# Patient Record
Sex: MALE | Race: WHITE | NOT HISPANIC OR LATINO | Employment: OTHER | URBAN - METROPOLITAN AREA
[De-identification: names, ages, dates, MRNs, and addresses within clinical notes are randomized per-mention and may not be internally consistent; named-entity substitution may affect disease eponyms.]

---

## 2023-04-03 LAB
THYROPEROXIDASE AB (IU/ML) IN SER/PLAS: >1000 IU/ML
THYROTROPIN (MIU/L) IN SER/PLAS BY DETECTION LIMIT <= 0.05 MIU/L: 0.33 MIU/L (ref 0.44–3.98)
THYROXINE (T4) FREE (NG/DL) IN SER/PLAS: 2.05 NG/DL (ref 0.78–1.48)

## 2023-04-06 LAB — THYROGLOBULIN AB (IU/ML) IN SER/PLAS: >2200 IU/ML (ref 0–4)

## 2023-06-20 LAB
ANION GAP IN SER/PLAS: 14 MMOL/L (ref 10–20)
CALCIUM (MG/DL) IN SER/PLAS: 10.2 MG/DL (ref 8.6–10.6)
CARBON DIOXIDE, TOTAL (MMOL/L) IN SER/PLAS: 29 MMOL/L (ref 21–32)
CHLORIDE (MMOL/L) IN SER/PLAS: 100 MMOL/L (ref 98–107)
CHOLESTEROL (MG/DL) IN SER/PLAS: 103 MG/DL (ref 0–199)
CHOLESTEROL IN HDL (MG/DL) IN SER/PLAS: 29 MG/DL
CHOLESTEROL/HDL RATIO: 3.6
CREATININE (MG/DL) IN SER/PLAS: 0.77 MG/DL (ref 0.5–1.3)
ERYTHROCYTE DISTRIBUTION WIDTH (RATIO) BY AUTOMATED COUNT: 13.4 % (ref 11.5–14.5)
ERYTHROCYTE MEAN CORPUSCULAR HEMOGLOBIN CONCENTRATION (G/DL) BY AUTOMATED: 33.3 G/DL (ref 32–36)
ERYTHROCYTE MEAN CORPUSCULAR VOLUME (FL) BY AUTOMATED COUNT: 84 FL (ref 80–100)
ERYTHROCYTES (10*6/UL) IN BLOOD BY AUTOMATED COUNT: 5.36 X10E12/L (ref 4.5–5.9)
GFR MALE: >90 ML/MIN/1.73M2
GLUCOSE (MG/DL) IN SER/PLAS: 97 MG/DL (ref 74–99)
HEMATOCRIT (%) IN BLOOD BY AUTOMATED COUNT: 45 % (ref 41–52)
HEMOGLOBIN (G/DL) IN BLOOD: 15 G/DL (ref 13.5–17.5)
LDL: 54 MG/DL (ref 0–99)
LEUKOCYTES (10*3/UL) IN BLOOD BY AUTOMATED COUNT: 3.7 X10E9/L (ref 4.4–11.3)
NRBC (PER 100 WBCS) BY AUTOMATED COUNT: 0 /100 WBC (ref 0–0)
PLATELETS (10*3/UL) IN BLOOD AUTOMATED COUNT: 101 X10E9/L (ref 150–450)
POTASSIUM (MMOL/L) IN SER/PLAS: 3.7 MMOL/L (ref 3.5–5.3)
SODIUM (MMOL/L) IN SER/PLAS: 139 MMOL/L (ref 136–145)
THYROTROPIN (MIU/L) IN SER/PLAS BY DETECTION LIMIT <= 0.05 MIU/L: 0.92 MIU/L (ref 0.44–3.98)
TRIGLYCERIDE (MG/DL) IN SER/PLAS: 100 MG/DL (ref 0–149)
UREA NITROGEN (MG/DL) IN SER/PLAS: 14 MG/DL (ref 6–23)
VLDL: 20 MG/DL (ref 0–40)

## 2023-07-17 DIAGNOSIS — I10 BENIGN ESSENTIAL HTN: Primary | ICD-10-CM

## 2023-07-17 RX ORDER — AMLODIPINE BESYLATE 10 MG/1
TABLET ORAL
Qty: 90 TABLET | Refills: 1 | Status: SHIPPED | OUTPATIENT
Start: 2023-07-17 | End: 2023-12-11 | Stop reason: ALTCHOICE

## 2023-10-04 ENCOUNTER — OFFICE VISIT (OUTPATIENT)
Dept: PRIMARY CARE | Facility: CLINIC | Age: 73
End: 2023-10-04
Payer: MEDICARE

## 2023-10-04 VITALS
BODY MASS INDEX: 30.43 KG/M2 | OXYGEN SATURATION: 97 % | SYSTOLIC BLOOD PRESSURE: 142 MMHG | HEART RATE: 71 BPM | TEMPERATURE: 98 F | DIASTOLIC BLOOD PRESSURE: 66 MMHG | WEIGHT: 212.6 LBS | HEIGHT: 70 IN | RESPIRATION RATE: 17 BRPM

## 2023-10-04 DIAGNOSIS — L56.8: Primary | ICD-10-CM

## 2023-10-04 PROBLEM — H65.90 SEROUS OTITIS MEDIA: Status: ACTIVE | Noted: 2023-10-04

## 2023-10-04 PROBLEM — Z95.828 S/P ASCENDING AORTIC REPLACEMENT: Status: ACTIVE | Noted: 2023-10-04

## 2023-10-04 PROBLEM — Z95.1 S/P CABG X 4: Status: ACTIVE | Noted: 2023-10-04

## 2023-10-04 PROBLEM — L82.1 OTHER SEBORRHEIC KERATOSIS: Status: ACTIVE | Noted: 2021-09-30

## 2023-10-04 PROBLEM — H52.03 HYPEROPIA WITH PRESBYOPIA OF BOTH EYES: Status: ACTIVE | Noted: 2023-10-04

## 2023-10-04 PROBLEM — L72.0 EPIDERMAL CYST: Status: ACTIVE | Noted: 2021-09-30

## 2023-10-04 PROBLEM — M77.8 LEFT WRIST TENDONITIS: Status: ACTIVE | Noted: 2023-10-04

## 2023-10-04 PROBLEM — M11.231 PSEUDOGOUT OF RIGHT WRIST: Status: ACTIVE | Noted: 2023-10-04

## 2023-10-04 PROBLEM — R89.9 ABNORMAL LABORATORY TEST: Status: ACTIVE | Noted: 2023-10-04

## 2023-10-04 PROBLEM — I71.20 AORTIC ANEURYSM, THORACIC (CMS-HCC): Status: ACTIVE | Noted: 2023-10-04

## 2023-10-04 PROBLEM — H52.4 HYPEROPIA WITH PRESBYOPIA OF BOTH EYES: Status: ACTIVE | Noted: 2023-10-04

## 2023-10-04 PROBLEM — M25.522 PAIN OF BOTH ELBOWS: Status: ACTIVE | Noted: 2023-10-04

## 2023-10-04 PROBLEM — M25.532 LEFT WRIST PAIN: Status: ACTIVE | Noted: 2023-10-04

## 2023-10-04 PROBLEM — M25.521 PAIN OF BOTH ELBOWS: Status: ACTIVE | Noted: 2023-10-04

## 2023-10-04 PROBLEM — H93.8X3 FULLNESS IN EAR, BILATERAL: Status: ACTIVE | Noted: 2023-10-04

## 2023-10-04 PROBLEM — U07.1 COVID-19 VIRUS INFECTION: Status: ACTIVE | Noted: 2023-10-04

## 2023-10-04 PROBLEM — H90.3 BILATERAL SENSORINEURAL HEARING LOSS: Status: ACTIVE | Noted: 2023-10-04

## 2023-10-04 PROBLEM — E03.9 HYPOTHYROIDISM: Status: ACTIVE | Noted: 2023-10-04

## 2023-10-04 PROBLEM — H52.13 MYOPIA, BILATERAL: Status: ACTIVE | Noted: 2023-10-04

## 2023-10-04 PROBLEM — L98.9 LESION OF SKIN OF SCALP: Status: ACTIVE | Noted: 2023-10-04

## 2023-10-04 PROBLEM — H52.203 ASTIGMATISM OF BOTH EYES: Status: ACTIVE | Noted: 2023-10-04

## 2023-10-04 PROBLEM — L91.8 OTHER HYPERTROPHIC DISORDERS OF THE SKIN: Status: ACTIVE | Noted: 2021-09-30

## 2023-10-04 PROBLEM — D69.6 THROMBOCYTOPENIA (CMS-HCC): Status: ACTIVE | Noted: 2023-10-04

## 2023-10-04 PROBLEM — I25.10 CAD (CORONARY ARTERY DISEASE): Status: ACTIVE | Noted: 2023-10-04

## 2023-10-04 PROBLEM — M18.9 OSTEOARTHRITIS OF CARPOMETACARPAL (CMC) JOINT OF THUMB: Status: ACTIVE | Noted: 2023-10-04

## 2023-10-04 PROBLEM — Z98.890 S/P COLONOSCOPY WITH POLYPECTOMY: Status: ACTIVE | Noted: 2023-10-04

## 2023-10-04 PROCEDURE — 1036F TOBACCO NON-USER: CPT | Performed by: INTERNAL MEDICINE

## 2023-10-04 PROCEDURE — 99213 OFFICE O/P EST LOW 20 MIN: CPT | Performed by: INTERNAL MEDICINE

## 2023-10-04 PROCEDURE — 3077F SYST BP >= 140 MM HG: CPT | Performed by: INTERNAL MEDICINE

## 2023-10-04 PROCEDURE — 1159F MED LIST DOCD IN RCRD: CPT | Performed by: INTERNAL MEDICINE

## 2023-10-04 PROCEDURE — 3078F DIAST BP <80 MM HG: CPT | Performed by: INTERNAL MEDICINE

## 2023-10-04 PROCEDURE — 1160F RVW MEDS BY RX/DR IN RCRD: CPT | Performed by: INTERNAL MEDICINE

## 2023-10-04 PROCEDURE — 1126F AMNT PAIN NOTED NONE PRSNT: CPT | Performed by: INTERNAL MEDICINE

## 2023-10-04 RX ORDER — LOSARTAN POTASSIUM 100 MG/1
TABLET ORAL
COMMUNITY
Start: 2022-05-26 | End: 2024-02-13

## 2023-10-04 RX ORDER — ACETAMINOPHEN 325 MG/1
TABLET ORAL EVERY 6 HOURS
COMMUNITY
Start: 2022-05-10

## 2023-10-04 RX ORDER — LEVOTHYROXINE SODIUM 137 UG/1
1 TABLET ORAL DAILY
COMMUNITY
Start: 2023-02-14

## 2023-10-04 RX ORDER — ATORVASTATIN CALCIUM 40 MG/1
1 TABLET, FILM COATED ORAL DAILY
COMMUNITY
Start: 2022-03-31 | End: 2024-01-24 | Stop reason: ALTCHOICE

## 2023-10-04 RX ORDER — AZITHROMYCIN 500 MG/1
TABLET, FILM COATED ORAL
COMMUNITY
Start: 2022-08-23 | End: 2023-10-04 | Stop reason: ALTCHOICE

## 2023-10-04 RX ORDER — FLUOCINONIDE 0.5 MG/G
OINTMENT TOPICAL 2 TIMES DAILY PRN
Qty: 60 G | Refills: 1 | Status: SHIPPED | OUTPATIENT
Start: 2023-10-04 | End: 2023-10-30 | Stop reason: ALTCHOICE

## 2023-10-04 RX ORDER — HYDROCHLOROTHIAZIDE 25 MG/1
1 TABLET ORAL DAILY
COMMUNITY
Start: 2022-07-28 | End: 2024-01-24 | Stop reason: ALTCHOICE

## 2023-10-04 RX ORDER — CARVEDILOL 25 MG/1
1 TABLET ORAL 2 TIMES DAILY
COMMUNITY
Start: 2022-07-06 | End: 2024-02-15 | Stop reason: SDUPTHER

## 2023-10-04 RX ORDER — ASPIRIN 81 MG/1
1 TABLET ORAL DAILY
COMMUNITY
Start: 2022-05-10

## 2023-10-04 ASSESSMENT — PATIENT HEALTH QUESTIONNAIRE - PHQ9
2. FEELING DOWN, DEPRESSED OR HOPELESS: NOT AT ALL
SUM OF ALL RESPONSES TO PHQ9 QUESTIONS 1 & 2: 0
1. LITTLE INTEREST OR PLEASURE IN DOING THINGS: NOT AT ALL

## 2023-10-04 ASSESSMENT — LIFESTYLE VARIABLES
HOW OFTEN DO YOU HAVE A DRINK CONTAINING ALCOHOL: MONTHLY OR LESS
HOW MANY STANDARD DRINKS CONTAINING ALCOHOL DO YOU HAVE ON A TYPICAL DAY: 1 OR 2
SKIP TO QUESTIONS 9-10: 1
HOW OFTEN DO YOU HAVE SIX OR MORE DRINKS ON ONE OCCASION: NEVER
AUDIT-C TOTAL SCORE: 1

## 2023-10-04 ASSESSMENT — ENCOUNTER SYMPTOMS
WOUND: 0
CONSTITUTIONAL NEGATIVE: 1
SHORTNESS OF BREATH: 0
ABDOMINAL PAIN: 0
COUGH: 0
DIZZINESS: 0

## 2023-10-04 NOTE — PATIENT INSTRUCTIONS
Assessment/Plan     Jalen was seen today for follow-up.  Diagnoses and all orders for this visit:  Allergic photocontact eczema (Primary)  -     fluocinonide (Lidex) 0.05 % ointment; Apply topically 2 times a day as needed for irritation or rash. Avoid face and groin.    Call or return for evaluation if symptoms worsen or persist with treatment

## 2023-10-04 NOTE — PROGRESS NOTES
Subjective   Patient ID: Jalen Laboy is a 72 y.o. male who presents for Follow-up (Patient is being seen for rash on arms and neck.).  The patient is a 73 YO male who is C/o an Itchy rash on the lateral aspect of both arms and the anterior aspect of the neck for the past appx 3 mths.  He believes that the rash is caused by  the sun.  He has been applying Calamine lotion, aloe vera gel and OTC hydrocortisone cream without resolution of the rash.        Review of Systems   Constitutional: Negative.    HENT: Negative.     Respiratory:  Negative for cough and shortness of breath.    Cardiovascular:  Negative for chest pain and leg swelling.   Gastrointestinal:  Negative for abdominal pain.   Skin:  Positive for rash. Negative for wound.   Neurological:  Negative for dizziness.       Objective   Physical Exam  Constitutional:       General: He is not in acute distress.     Appearance: He is not ill-appearing.   Cardiovascular:      Rate and Rhythm: Normal rate and regular rhythm.   Pulmonary:      Effort: Pulmonary effort is normal.      Breath sounds: Normal breath sounds.   Musculoskeletal:      Cervical back: No tenderness.      Right lower leg: No edema.      Left lower leg: No edema.   Lymphadenopathy:      Cervical: No cervical adenopathy.   Skin:     General: Skin is dry.      Findings: Erythema and rash present.      Comments: Erythematous maculopapular rash of the lateral aspects of arms,forearms and anterior aspect of the neck.  Skin is intact   Neurological:      Mental Status: He is alert and oriented to person, place, and time.   Psychiatric:         Behavior: Behavior normal.         Assessment/Plan     Jalen was seen today for follow-up.  Diagnoses and all orders for this visit:  Allergic photocontact eczema (Primary)  -     fluocinonide (Lidex) 0.05 % ointment; Apply topically 2 times a day as needed for irritation or rash. Avoid face and groin.    Call or return for evaluation if symptoms worsen or  persist with treatment

## 2023-10-11 ENCOUNTER — APPOINTMENT (OUTPATIENT)
Dept: PRIMARY CARE | Facility: CLINIC | Age: 73
End: 2023-10-11
Payer: MEDICARE

## 2023-10-30 ENCOUNTER — OFFICE VISIT (OUTPATIENT)
Dept: PRIMARY CARE | Facility: CLINIC | Age: 73
End: 2023-10-30
Payer: MEDICARE

## 2023-10-30 VITALS
OXYGEN SATURATION: 98 % | DIASTOLIC BLOOD PRESSURE: 56 MMHG | SYSTOLIC BLOOD PRESSURE: 118 MMHG | HEART RATE: 102 BPM | HEIGHT: 70 IN | RESPIRATION RATE: 15 BRPM | WEIGHT: 211.6 LBS | BODY MASS INDEX: 30.29 KG/M2

## 2023-10-30 DIAGNOSIS — L56.8: ICD-10-CM

## 2023-10-30 DIAGNOSIS — E03.9 HYPOTHYROIDISM, UNSPECIFIED TYPE: ICD-10-CM

## 2023-10-30 DIAGNOSIS — R56.9: ICD-10-CM

## 2023-10-30 DIAGNOSIS — I25.10 CORONARY ARTERY DISEASE INVOLVING NATIVE HEART WITHOUT ANGINA PECTORIS, UNSPECIFIED VESSEL OR LESION TYPE: ICD-10-CM

## 2023-10-30 DIAGNOSIS — D69.6 THROMBOCYTOPENIA (CMS-HCC): ICD-10-CM

## 2023-10-30 DIAGNOSIS — I10 BENIGN ESSENTIAL HTN: ICD-10-CM

## 2023-10-30 DIAGNOSIS — Z13.89 ENCOUNTER FOR SCREENING FOR OTHER DISORDER: ICD-10-CM

## 2023-10-30 DIAGNOSIS — Z00.00 MEDICARE ANNUAL WELLNESS VISIT, SUBSEQUENT: Primary | ICD-10-CM

## 2023-10-30 PROCEDURE — 1126F AMNT PAIN NOTED NONE PRSNT: CPT | Performed by: STUDENT IN AN ORGANIZED HEALTH CARE EDUCATION/TRAINING PROGRAM

## 2023-10-30 PROCEDURE — 1160F RVW MEDS BY RX/DR IN RCRD: CPT | Performed by: STUDENT IN AN ORGANIZED HEALTH CARE EDUCATION/TRAINING PROGRAM

## 2023-10-30 PROCEDURE — 1170F FXNL STATUS ASSESSED: CPT | Performed by: STUDENT IN AN ORGANIZED HEALTH CARE EDUCATION/TRAINING PROGRAM

## 2023-10-30 PROCEDURE — G0439 PPPS, SUBSEQ VISIT: HCPCS | Performed by: STUDENT IN AN ORGANIZED HEALTH CARE EDUCATION/TRAINING PROGRAM

## 2023-10-30 PROCEDURE — G0444 DEPRESSION SCREEN ANNUAL: HCPCS | Performed by: STUDENT IN AN ORGANIZED HEALTH CARE EDUCATION/TRAINING PROGRAM

## 2023-10-30 PROCEDURE — 99397 PER PM REEVAL EST PAT 65+ YR: CPT | Performed by: STUDENT IN AN ORGANIZED HEALTH CARE EDUCATION/TRAINING PROGRAM

## 2023-10-30 PROCEDURE — 1036F TOBACCO NON-USER: CPT | Performed by: STUDENT IN AN ORGANIZED HEALTH CARE EDUCATION/TRAINING PROGRAM

## 2023-10-30 PROCEDURE — 1159F MED LIST DOCD IN RCRD: CPT | Performed by: STUDENT IN AN ORGANIZED HEALTH CARE EDUCATION/TRAINING PROGRAM

## 2023-10-30 PROCEDURE — 3074F SYST BP LT 130 MM HG: CPT | Performed by: STUDENT IN AN ORGANIZED HEALTH CARE EDUCATION/TRAINING PROGRAM

## 2023-10-30 PROCEDURE — 99213 OFFICE O/P EST LOW 20 MIN: CPT | Performed by: STUDENT IN AN ORGANIZED HEALTH CARE EDUCATION/TRAINING PROGRAM

## 2023-10-30 PROCEDURE — 3078F DIAST BP <80 MM HG: CPT | Performed by: STUDENT IN AN ORGANIZED HEALTH CARE EDUCATION/TRAINING PROGRAM

## 2023-10-30 RX ORDER — CLOBETASOL PROPIONATE 0.5 MG/G
OINTMENT TOPICAL 2 TIMES DAILY
Qty: 60 G | Refills: 0 | Status: SHIPPED | OUTPATIENT
Start: 2023-10-30 | End: 2023-11-13

## 2023-10-30 ASSESSMENT — LIFESTYLE VARIABLES
HOW OFTEN DO YOU HAVE A DRINK CONTAINING ALCOHOL: NEVER
AUDIT-C TOTAL SCORE: 0
HOW MANY STANDARD DRINKS CONTAINING ALCOHOL DO YOU HAVE ON A TYPICAL DAY: PATIENT DOES NOT DRINK
HOW OFTEN DO YOU HAVE SIX OR MORE DRINKS ON ONE OCCASION: NEVER
SKIP TO QUESTIONS 9-10: 1

## 2023-10-30 ASSESSMENT — ACTIVITIES OF DAILY LIVING (ADL)
TAKING_MEDICATION: INDEPENDENT
DOING_HOUSEWORK: INDEPENDENT
MANAGING_FINANCES: INDEPENDENT
GROCERY_SHOPPING: INDEPENDENT
BATHING: INDEPENDENT
DRESSING: INDEPENDENT

## 2023-10-30 NOTE — PROGRESS NOTES
"Subjective   Reason for Visit: Jalen Laboy is a pleasant 72 y.o. male with significant history of hypertension, CAD status post CABG, hypothyroidism thoracic aneurysm status post ascending thoracic replacement here for a Medicare Wellness visit.     Past Medical, Surgical, and Family History reviewed and updated in chart.    Reviewed all medications by prescribing practitioner or clinical pharmacist (such as prescriptions, OTCs, herbal therapies and supplements) and documented in the medical record.    HPI    Patient is here for Medicare wellness visit.  He is also concerned about a rash on bilateral upper extremities that is now spreading into the anterior chest.  Reports that the rash started after sun exposure in June and has progressively getting worse.  He was started on fluocinonide earlier this month has not noted significant improvement with fluocinonide.    HM:        Colonoscopy: 3/7/2023, repeat in 5 years  Influenza: influenza vaccine was previously given.   Pneumovax 23: Pneumovax 23 vaccine was previously given.   Prevnar 13: Prevnar 13 vaccine was previously given.   Shingles Vaccine: Shingles vaccine was previously given.   Prostate cancer screening: Screening is current.   Abdominal Aortic Aneurysm screening: screening is current.   HIV screening: screening not indicated.     Patient Care Team:  Geraldine Marsh MD as PCP - General (Internal Medicine)  Kayla Zhao MD as PCP - [employer] PCP     Review of Systems   All other systems reviewed and are negative.      Objective   Vitals:  /56   Pulse 102   Resp 15   Ht 1.778 m (5' 10\")   Wt 96 kg (211 lb 9.6 oz)   SpO2 98%   BMI 30.36 kg/m²       Physical Exam  Constitutional:       General: He is not in acute distress.     Appearance: Normal appearance.   HENT:      Head: Normocephalic and atraumatic.   Eyes:      General: No scleral icterus.     Conjunctiva/sclera: Conjunctivae normal.   Cardiovascular:      Rate and " Rhythm: Normal rate and regular rhythm.      Heart sounds: Normal heart sounds.   Pulmonary:      Effort: Pulmonary effort is normal.      Breath sounds: Normal breath sounds. No wheezing.   Abdominal:      General: Bowel sounds are normal. There is no distension.      Palpations: Abdomen is soft.      Tenderness: There is no abdominal tenderness.   Musculoskeletal:      Cervical back: Neck supple.      Right lower leg: No edema.      Left lower leg: No edema.   Lymphadenopathy:      Cervical: No cervical adenopathy.   Skin:     General: Skin is warm and dry.      Findings: Erythema and rash (bilateral upper ext and ant chest) present.   Neurological:      General: No focal deficit present.      Mental Status: He is alert and oriented to person, place, and time.   Psychiatric:         Mood and Affect: Mood normal.         Behavior: Behavior normal.         Assessment/Plan   Problem List Items Addressed This Visit       Benign essential HTN    Relevant Orders    CBC and Auto Differential    Comprehensive Metabolic Panel    CAD (coronary artery disease)    Relevant Orders    CBC and Auto Differential    Comprehensive Metabolic Panel    Lipid Panel    Hypothyroidism    Relevant Orders    TSH with reflex to Free T4 if abnormal    Single seizure (CMS/HCC)    Overview     Formatting of this note might be different from the original. Single witnessed seizure 3/19/2015; no recurrence.  MRI brain and EEG unremarkable. Formatting of this note might be different from the original. Overview: Single witnessed seizure 3/19/2015; no recurrence.  MRI brain and EEG unremarkable.         Current Assessment & Plan     Stable, no recent seizure episodes         Thrombocytopenia (CMS/HCC)    Current Assessment & Plan     Last platelet count 101, repeat ordered         Relevant Orders    CBC and Auto Differential     Other Visit Diagnoses       Medicare annual wellness visit, subsequent    -  Primary    Encounter for screening for other  disorder        Allergic photocontact eczema        Relevant Medications    clobetasol (Temovate) 0.05 % ointment    Other Relevant Orders    Referral to Dermatology

## 2023-10-30 NOTE — PATIENT INSTRUCTIONS
Referral to Dermatology.   Continue with current medications.  Blood work before your next visit.  If blood work or imaging were ordered during your visit, all the nonurgent lab results will be discussed with you at your next office visit.  Please arrive 15 minutes before your appointment.   Return to office in 6 months or as needed     Please schedule your medicare wellness for Oct 2024.

## 2023-12-06 ENCOUNTER — OFFICE VISIT (OUTPATIENT)
Dept: OPHTHALMOLOGY | Facility: CLINIC | Age: 73
End: 2023-12-06
Payer: MEDICARE

## 2023-12-06 DIAGNOSIS — H52.4 PRESBYOPIA OF BOTH EYES: ICD-10-CM

## 2023-12-06 DIAGNOSIS — H25.13 AGE-RELATED NUCLEAR CATARACT OF BOTH EYES: Primary | ICD-10-CM

## 2023-12-06 DIAGNOSIS — H52.13 MYOPIA, BILATERAL: ICD-10-CM

## 2023-12-06 PROCEDURE — 92015 DETERMINE REFRACTIVE STATE: CPT | Performed by: STUDENT IN AN ORGANIZED HEALTH CARE EDUCATION/TRAINING PROGRAM

## 2023-12-06 PROCEDURE — 92014 COMPRE OPH EXAM EST PT 1/>: CPT | Performed by: STUDENT IN AN ORGANIZED HEALTH CARE EDUCATION/TRAINING PROGRAM

## 2023-12-06 ASSESSMENT — ENCOUNTER SYMPTOMS
GASTROINTESTINAL NEGATIVE: 0
CARDIOVASCULAR NEGATIVE: 0
HEMATOLOGIC/LYMPHATIC NEGATIVE: 0
ALLERGIC/IMMUNOLOGIC NEGATIVE: 0
EYES NEGATIVE: 0
MUSCULOSKELETAL NEGATIVE: 0
NEUROLOGICAL NEGATIVE: 0
PSYCHIATRIC NEGATIVE: 0
RESPIRATORY NEGATIVE: 0
ENDOCRINE NEGATIVE: 0
CONSTITUTIONAL NEGATIVE: 0

## 2023-12-06 ASSESSMENT — VISUAL ACUITY
OD_CC: 20/25
METHOD: SNELLEN - LINEAR
OS_CC+: -2
CORRECTION_TYPE: GLASSES
OS_CC: 20/25

## 2023-12-06 ASSESSMENT — REFRACTION_WEARINGRX
OD_CYLINDER: -0.50
OS_CYLINDER: -1.00
OS_AXIS: 070
OS_SPHERE: -2.50
OS_ADD: +2.50
SPECS_TYPE: PAL
OD_AXIS: 100
OD_ADD: +2.50
OD_SPHERE: -2.50

## 2023-12-06 ASSESSMENT — TONOMETRY
OS_IOP_MMHG: 19
IOP_METHOD: GOLDMANN APPLANATION
OD_IOP_MMHG: 19

## 2023-12-06 ASSESSMENT — CONF VISUAL FIELD
OS_NORMAL: 1
OD_INFERIOR_TEMPORAL_RESTRICTION: 0
OD_INFERIOR_NASAL_RESTRICTION: 0
OS_INFERIOR_TEMPORAL_RESTRICTION: 0
METHOD: COUNTING FINGERS
OD_NORMAL: 1
OS_SUPERIOR_TEMPORAL_RESTRICTION: 0
OS_INFERIOR_NASAL_RESTRICTION: 0
OD_SUPERIOR_TEMPORAL_RESTRICTION: 0
OD_SUPERIOR_NASAL_RESTRICTION: 0
OS_SUPERIOR_NASAL_RESTRICTION: 0

## 2023-12-06 ASSESSMENT — REFRACTION_MANIFEST
OS_AXIS: 065
OD_CYLINDER: -1.00
OD_SPHERE: -2.25
OD_ADD: +2.50
OS_ADD: +2.50
OD_AXIS: 110
OS_SPHERE: -2.25
OS_CYLINDER: -1.50

## 2023-12-06 ASSESSMENT — CUP TO DISC RATIO
OD_RATIO: .30
OS_RATIO: .30

## 2023-12-06 ASSESSMENT — EXTERNAL EXAM - LEFT EYE: OS_EXAM: NORMAL

## 2023-12-06 ASSESSMENT — EXTERNAL EXAM - RIGHT EYE: OD_EXAM: NORMAL

## 2023-12-06 NOTE — PROGRESS NOTES
Assessment/Plan   Diagnoses and all orders for this visit:  Age-related nuclear cataract of both eyes  -non visually significant; very mild early findings  -pt ed; monitor at this time  Myopia, bilateral  Presbyopia of both eyes  -New spec rx released today per patient request. Ocular health wnl for age OU. Monitor 1 year or sooner prn. Refraction billed today. Small changes to MRX accepted on trial frame. BCVA 20/20 OD+OS.    RTC 1 year for annual with DFE and MRX

## 2023-12-07 ENCOUNTER — LAB (OUTPATIENT)
Dept: LAB | Facility: LAB | Age: 73
End: 2023-12-07
Payer: MEDICARE

## 2023-12-07 DIAGNOSIS — I10 BENIGN ESSENTIAL HTN: ICD-10-CM

## 2023-12-07 DIAGNOSIS — I25.10 CORONARY ARTERY DISEASE INVOLVING NATIVE HEART WITHOUT ANGINA PECTORIS, UNSPECIFIED VESSEL OR LESION TYPE: ICD-10-CM

## 2023-12-07 DIAGNOSIS — E03.9 HYPOTHYROIDISM, UNSPECIFIED TYPE: ICD-10-CM

## 2023-12-07 DIAGNOSIS — D69.6 THROMBOCYTOPENIA (CMS-HCC): ICD-10-CM

## 2023-12-07 LAB
ALBUMIN SERPL BCP-MCNC: 3.7 G/DL (ref 3.4–5)
ALP SERPL-CCNC: 70 U/L (ref 33–136)
ALT SERPL W P-5'-P-CCNC: 26 U/L (ref 10–52)
ANION GAP SERPL CALC-SCNC: 10 MMOL/L (ref 10–20)
AST SERPL W P-5'-P-CCNC: 18 U/L (ref 9–39)
BASOPHILS # BLD AUTO: 0.01 X10*3/UL (ref 0–0.1)
BASOPHILS NFR BLD AUTO: 0.2 %
BILIRUB SERPL-MCNC: 1.2 MG/DL (ref 0–1.2)
BUN SERPL-MCNC: 15 MG/DL (ref 6–23)
CALCIUM SERPL-MCNC: 9.6 MG/DL (ref 8.6–10.6)
CHLORIDE SERPL-SCNC: 101 MMOL/L (ref 98–107)
CHOLEST SERPL-MCNC: 102 MG/DL (ref 0–199)
CHOLESTEROL/HDL RATIO: 2.8
CO2 SERPL-SCNC: 33 MMOL/L (ref 21–32)
CREAT SERPL-MCNC: 0.84 MG/DL (ref 0.5–1.3)
EOSINOPHIL # BLD AUTO: 0.02 X10*3/UL (ref 0–0.4)
EOSINOPHIL NFR BLD AUTO: 0.4 %
ERYTHROCYTE [DISTWIDTH] IN BLOOD BY AUTOMATED COUNT: 13 % (ref 11.5–14.5)
GFR SERPL CREATININE-BSD FRML MDRD: >90 ML/MIN/1.73M*2
GLUCOSE SERPL-MCNC: 99 MG/DL (ref 74–99)
HCT VFR BLD AUTO: 41.1 % (ref 41–52)
HDLC SERPL-MCNC: 36.7 MG/DL
HGB BLD-MCNC: 13.7 G/DL (ref 13.5–17.5)
IMM GRANULOCYTES # BLD AUTO: 0.04 X10*3/UL (ref 0–0.5)
IMM GRANULOCYTES NFR BLD AUTO: 0.8 % (ref 0–0.9)
LDLC SERPL CALC-MCNC: 52 MG/DL
LYMPHOCYTES # BLD AUTO: 1.22 X10*3/UL (ref 0.8–3)
LYMPHOCYTES NFR BLD AUTO: 24.1 %
MCH RBC QN AUTO: 28.5 PG (ref 26–34)
MCHC RBC AUTO-ENTMCNC: 33.3 G/DL (ref 32–36)
MCV RBC AUTO: 85 FL (ref 80–100)
MONOCYTES # BLD AUTO: 1.01 X10*3/UL (ref 0.05–0.8)
MONOCYTES NFR BLD AUTO: 20 %
NEUTROPHILS # BLD AUTO: 2.76 X10*3/UL (ref 1.6–5.5)
NEUTROPHILS NFR BLD AUTO: 54.5 %
NON HDL CHOLESTEROL: 65 MG/DL (ref 0–149)
NRBC BLD-RTO: 0 /100 WBCS (ref 0–0)
PLATELET # BLD AUTO: 143 X10*3/UL (ref 150–450)
POTASSIUM SERPL-SCNC: 3.6 MMOL/L (ref 3.5–5.3)
PROT SERPL-MCNC: 6.8 G/DL (ref 6.4–8.2)
RBC # BLD AUTO: 4.81 X10*6/UL (ref 4.5–5.9)
SODIUM SERPL-SCNC: 140 MMOL/L (ref 136–145)
TRIGL SERPL-MCNC: 65 MG/DL (ref 0–149)
TSH SERPL-ACNC: 2.85 MIU/L (ref 0.44–3.98)
VLDL: 13 MG/DL (ref 0–40)
WBC # BLD AUTO: 5.1 X10*3/UL (ref 4.4–11.3)

## 2023-12-07 PROCEDURE — 36415 COLL VENOUS BLD VENIPUNCTURE: CPT

## 2023-12-07 PROCEDURE — 84443 ASSAY THYROID STIM HORMONE: CPT

## 2023-12-07 PROCEDURE — 80061 LIPID PANEL: CPT

## 2023-12-07 PROCEDURE — 85025 COMPLETE CBC W/AUTO DIFF WBC: CPT

## 2023-12-07 PROCEDURE — 80053 COMPREHEN METABOLIC PANEL: CPT

## 2023-12-11 ENCOUNTER — OFFICE VISIT (OUTPATIENT)
Dept: CARDIOLOGY | Facility: HOSPITAL | Age: 73
End: 2023-12-11
Payer: MEDICARE

## 2023-12-11 ENCOUNTER — HOSPITAL ENCOUNTER (OUTPATIENT)
Dept: CARDIOLOGY | Facility: HOSPITAL | Age: 73
Discharge: HOME | End: 2023-12-11
Payer: MEDICARE

## 2023-12-11 VITALS
OXYGEN SATURATION: 100 % | DIASTOLIC BLOOD PRESSURE: 71 MMHG | BODY MASS INDEX: 30.37 KG/M2 | HEART RATE: 72 BPM | WEIGHT: 212.1 LBS | HEIGHT: 70 IN | SYSTOLIC BLOOD PRESSURE: 138 MMHG

## 2023-12-11 DIAGNOSIS — I25.10 CORONARY ARTERY DISEASE INVOLVING NATIVE HEART WITHOUT ANGINA PECTORIS, UNSPECIFIED VESSEL OR LESION TYPE: Primary | ICD-10-CM

## 2023-12-11 DIAGNOSIS — Z95.828 S/P ASCENDING AORTIC REPLACEMENT: ICD-10-CM

## 2023-12-11 DIAGNOSIS — Z95.1 S/P CABG X 4: ICD-10-CM

## 2023-12-11 DIAGNOSIS — I10 BENIGN ESSENTIAL HTN: ICD-10-CM

## 2023-12-11 LAB
ATRIAL RATE: 72 BPM
P AXIS: 54 DEGREES
P OFFSET: 198 MS
P ONSET: 134 MS
PR INTERVAL: 178 MS
Q ONSET: 223 MS
QRS COUNT: 12 BEATS
QRS DURATION: 76 MS
QT INTERVAL: 394 MS
QTC CALCULATION(BAZETT): 431 MS
QTC FREDERICIA: 418 MS
R AXIS: 63 DEGREES
T AXIS: 35 DEGREES
T OFFSET: 420 MS
VENTRICULAR RATE: 72 BPM

## 2023-12-11 PROCEDURE — 1159F MED LIST DOCD IN RCRD: CPT | Performed by: INTERNAL MEDICINE

## 2023-12-11 PROCEDURE — 99214 OFFICE O/P EST MOD 30 MIN: CPT | Performed by: INTERNAL MEDICINE

## 2023-12-11 PROCEDURE — 1036F TOBACCO NON-USER: CPT | Performed by: INTERNAL MEDICINE

## 2023-12-11 PROCEDURE — 93005 ELECTROCARDIOGRAM TRACING: CPT

## 2023-12-11 PROCEDURE — 3078F DIAST BP <80 MM HG: CPT | Performed by: INTERNAL MEDICINE

## 2023-12-11 PROCEDURE — 1126F AMNT PAIN NOTED NONE PRSNT: CPT | Performed by: INTERNAL MEDICINE

## 2023-12-11 PROCEDURE — 93010 ELECTROCARDIOGRAM REPORT: CPT | Performed by: INTERNAL MEDICINE

## 2023-12-11 PROCEDURE — 1160F RVW MEDS BY RX/DR IN RCRD: CPT | Performed by: INTERNAL MEDICINE

## 2023-12-11 PROCEDURE — 3075F SYST BP GE 130 - 139MM HG: CPT | Performed by: INTERNAL MEDICINE

## 2023-12-11 RX ORDER — AMLODIPINE BESYLATE 2.5 MG/1
2.5 TABLET ORAL DAILY
COMMUNITY
Start: 2023-12-03 | End: 2024-01-24 | Stop reason: SDUPTHER

## 2023-12-11 ASSESSMENT — ENCOUNTER SYMPTOMS
OCCASIONAL FEELINGS OF UNSTEADINESS: 0
LOSS OF SENSATION IN FEET: 0
DEPRESSION: 0

## 2023-12-11 NOTE — PROGRESS NOTES
Primary Care Physician: Geraldine Marsh MD      Date of Visit: 12/11/2023  8:20 AM EST  Location of visit: [facility]   Type of Visit: Established Patient     HPI / Summary:     Patient is a 73 year old man with h/o HTN with aortic aneurysm (4.5 -4.6 cm) with coronary artery calcifications fond to have 3 vessel disease who underwent CABG x 4 and ascending aortic replacement with 30mm Gelweave graft on May 5 2022 who returns for followup     CAD risk factors: former smoker, no DM, no FHx of CAD, mildly elevated lipids  Pt with moderately dilated ascending aorta which appears to be stable in size since 2019. Was also found to have coronary artery calcifications. Echo shows trileaflet aortic valve.  April 24 2022: CTA with heart flow : CAC total 514.8 ( .5, LCx 0, .3  LM no significant disease, LAD: proximal to mid focal 70% stenosis, Ramus with proximal to mid focal calcific stenosis up to 70%, RCA focal calcific plaque. Ascending aorta 4..6 cm. CT FFR showed mid LAD stenosis FF 0.57, OM1 0.62, distal RCA 0,9  Cardiac cath 4/29/2022: ( [facility]) - LAD proximal 80%, OM1 90%, Ramus 80%, RPL proximal 50%  5/4/2022; CABG x 4( LIMA-LAD, [id number], Radial-RCA. and AA replacement with 30mm Gelweave graft ( [facility])   Hospital course was uneventful and pt was d/c to home to continue imdur for 1 month for radial artery ( to prevent spasm). d/c to home 5/11/2022     Pt completed cardiac rehab.. He lives in fourth floor walkup and has no issues walking up to home. He takes his atorvastatin 40 mg daily without myalgias. Also uses antibiotic prophylaxis for dental work ( azithromycin d/t PCN allergy).  Had been walking on treadmill 30 minutes daily  without CP or SOB. However after awhile started to have knee pain so currently doing no dedicated exercise though does go up his 4 flights to home. . Denies any palpitations presyncope or syncope. He is not checking his Bp at home though recently at primary   office was 115/70mmHg. Pt developed a diffuse skin rash over the summer after being in the sun. He had been wearing sun screen, Initially thought sun triggered eczema, and tried several ointments without improvement. Eventually saw dermatology and had 2 biopsies, and was put on very high dose prednisone ( 40 mg daily for 2 weeks). Dermatology felt it was due to an allergy. They thought most likely due to allergy to his statin. In addition, prior to starting the prednisone his skin was very sensitive, which did improve when on prednisone Once he stopped the prednisone  1 week later had diffuse muscle pains. He had gained weight while on the prednisone ( had been eating a lot more) but his wait is reportedly back to baseline. Now that he has been off prednisone he feels his skin is getting sensitivite again. Pt states that dermatology wants him to be off the statin for 30 days and will then check him to see if that was cause of his rash.     ROS: Full 10 pt review of symptoms of negative unless discussed above.     Problems:   Patient Active Problem List   Diagnosis    Benign essential HTN    Abnormal laboratory test    Aortic aneurysm, thoracic (CMS/HCC)    Benign neoplasm of skin    Bilateral sensorineural hearing loss    CAD (coronary artery disease)    COVID-19 virus infection    Epidermal cyst    Fullness in ear, bilateral    Hypothyroidism    Left wrist pain    Left wrist tendonitis    Lesion of skin of scalp    Myopia, bilateral    Osteoarthritis of carpometacarpal (CMC) joint of thumb    Other hypertrophic disorders of the skin    Pain of both elbows    Hypertension    Pseudogout of right wrist    S/P ascending aortic replacement    S/P CABG x 4    S/P colonoscopy with polypectomy    Serous otitis media    Single seizure (CMS/HCC)    Other seborrheic keratosis    Thrombocytopenia (CMS/HCC)    Presbyopia of both eyes    Age-related nuclear cataract of both eyes     Medical History:   Past Medical History:  "  Diagnosis Date    Heart disease     Personal history of other benign neoplasm     History of benign neoplasm of skin    Unspecified convulsions (CMS/HCC)     Single seizure     Surgical Hx:   Past Surgical History:   Procedure Laterality Date    CARDIAC SURGERY      CT ANGIO CORONARY ART WITH HEARTFLOW IF SCORE >30%  04/19/2022    CT HEART CORONARY ANGIOGRAM 4/19/2022 [facility] ANCILLARY LEGACY      Social Hx:   Tobacco Use: Medium Risk (12/17/2023)    Patient History     Smoking Tobacco Use: Former     Smokeless Tobacco Use: Never     Passive Exposure: Not on file     Alcohol Use: Not At Risk (10/30/2023)    AUDIT-C     Frequency of Alcohol Consumption: Never     Average Number of Drinks: Patient does not drink     Frequency of Binge Drinking: Never     Family Hx:   Family History   Problem Relation Name Age of Onset    Hypertension Mother Latosha       Exam:   Vitals:   Vitals:    12/11/23 0832   BP: 138/71   BP Location: Left arm   Patient Position: Sitting   BP Cuff Size: Adult   Pulse: 72   SpO2: 100%   Weight: 96.2 kg (212 lb 1.6 oz)   Height: 1.778 m (5' 10\")     Wt Readings from Last 5 Encounters:   12/11/23 96.2 kg (212 lb 1.6 oz)   10/30/23 96 kg (211 lb 9.6 oz)   10/04/23 96.4 kg (212 lb 9.6 oz)   06/19/23 94.8 kg (209 lb 0.8 oz)   02/14/23 98.5 kg (217 lb 2 oz)      Constitutional:       Appearance: Healthy appearance. Not in distress.   Pulmonary:      Effort: Pulmonary effort is normal.      Breath sounds: Normal breath sounds.   Skin:     Comments: Diffuse rash over his torso/total body       Labs:   Recent Labs     12/07/23  0722 06/20/23  0705 05/26/22  1017 05/11/22  0625 05/10/22  0608 05/09/22  0621 05/08/22  0647 05/07/22  0726   WBC 5.1 3.7* 3.8* 7.5 6.3 7.5 11.1 15.7*   HGB 13.7 15.0 10.1* 8.8* 8.3* 8.7* 8.6* 9.7*   HCT 41.1 45.0 32.9* 25.6* 25.7* 25.8* 25.5* 27.9*   * 101* 299 166 186 75* 132* 128*   MCV 85 84 88 85 91 86 90 87     Recent Labs     12/07/23  0722 06/20/23  0705 09/07/22  0834 " 08/05/22  0815 07/27/22  1032 05/26/22  1017 05/11/22  0625 05/10/22  0608    139 136 137 136 135* 135* 138   K 3.6 3.7 4.6 4.6 4.9 4.1 3.7 3.4*    100 97* 97* 98 97* 99 100   BUN 15 14 12 11 11 11 12 17   CREATININE 0.84 0.77 0.75 0.77 0.73 0.67 0.54 0.53        Lab Results   Component Value Date    CHOL 102 12/07/2023    HDL 36.7 12/07/2023    LDLF 54 06/20/2023    TRIG 65 12/07/2023   LDL 12/7/2023 52.     Notable Studies: imaging personally reviewed and summarized by me below  EKG:  Encounter Date: 12/11/23   ECG 12 lead (Clinic Performed)   Result Value    Ventricular Rate 72    Atrial Rate 72    WA Interval 178    QRS Duration 76    QT Interval 394    QTC Calculation(Bazett) 431    P Axis 54    R Axis 63    T Axis 35    QRS Count 12    Q Onset 223    P Onset 134    P Offset 198    T Offset 420    QTC Fredericia 418    Narrative    Normal sinus rhythm  Possible Left atrial enlargement  Septal infarct , age undetermined  Abnormal ECG  Confirmed by Carlos Qureshi ([id number]) on 12/11/2023 10:25:16 AM       Echo:  - Echo (4/5/2023)  PHYSICIAN INTERPRETATION:  Left Ventricle: The left ventricular systolic function is normal, with an estimated ejection fraction of 60-65%. The calculated ejection fraction is normal at 63 % using the Hu's Bi-plane MOD calculation. There are no regional wall motion abnormalities. The left ventricular cavity size is normal. The left ventricular septal wall thickness is mildly increased. There is normal left ventricular posterior wall thickness. Abnormal (paradoxical) septal motion consistent with post-operative status. Spectral Doppler shows a normal pattern of left ventricular diastolic filling.  Left Atrium: The left atrium is moderately dilated. There has been an interval increase in the Left Atrial Volume Index from38.4 ml/m2 to 47.1 ml/m2.  Right Ventricle: The right ventricle is normal in size. There is normal right ventricular global systolic function.  Right  Atrium: The right atrium is mildly dilated.  Aortic Valve: The aortic valve is trileaflet. There is no evidence of aortic valve regurgitation. The peak instantaneous gradient of the aortic valve is 8.5 mmHg. The mean gradient of the aortic valve is 3.0 mmHg.  Mitral Valve: The mitral valve is mildly thickened. There is trace mitral valve regurgitation.  Tricuspid Valve: The tricuspid valve is structurally normal. There is mild tricuspid regurgitation. The Doppler estimated RVSP is mildly elevated at 39.7 mmHg.  Pulmonic Valve: The pulmonic valve is structurally normal. There is physiologic pulmonic valve regurgitation.  Pericardium: There is no pericardial effusion noted.  Aorta: The aortic root is abnormal. There is no dilatation of the ascending aorta. There is no dilatation of the aortic root. There is a prosthetic graft seen in the position of the ascending aorta. Aorta is known to be a 30 mm Gelweave Graft placed 5/5/2022.  Systemic Veins: The inferior vena cava appears to be of normal size.  In comparison to the previous echocardiogram(s): Compared with study from 2/23/2022,. Patient is known to have undergone CABG with Ascending Aorta Replacement. LVEF remains stable. Mild Increase in RVSP.        CONCLUSIONS:  1. Left ventricular systolic function is normal with a 60-65% estimated ejection fraction.  2. Abnormal septal motion consistent with post-operative status.  3. The left atrium is moderately dilated.  4. Mildly elevated RVSP.  5. Prosthetic graft in the ascending aorta position.  6. Patient is known to have undergone CABG with Ascending Aorta Replacement. LVEF remains stable. Mild Increase in RVSP.         Current Outpatient Medications   Medication Instructions    acetaminophen (Tylenol) 325 mg tablet oral, Every 6 hours    amLODIPine (NORVASC) 2.5 mg, oral, Daily    aspirin 81 mg EC tablet 1 tablet, oral, Daily    atorvastatin (Lipitor) 40 mg tablet 1 tablet, oral, Daily    carvedilol (Coreg) 25 mg  tablet 1 tablet, oral, 2 times daily    fish oil concentrate (Omega-3) 120-180 mg capsule 1 capsule, oral, Daily    hydroCHLOROthiazide (HYDRODiuril) 25 mg tablet 1 tablet, oral, Daily    levothyroxine (Synthroid, Levoxyl) 137 mcg tablet 1 tablet, oral, Daily    losartan (Cozaar) 100 mg tablet oral     Impressions and Plan:     Patient is a 73year old man with HTN and a moderately dilated aortic aneurysm found to have extensive coronary artery calcifications found to have 3 vessel disease and is post op from CABG x 4 with ascending aortic replacement with 30mm Gelweave conduit. He is doing quite well from a cardiac perspective , exercising routinely without without CP or SOB, until he started having knee pain. He remains asymptomatic with his ADLS including going up to his four floor walk up apartment. . He is tolerating the atorvastatin 40 mg daily well without myalgia, however developed a total body rash which was biopsied by dermatology who feel is an allergic rsh. They feel most likely due to his atorvastatin. He will remain on his aspirin. His BP appears to be adequately controlled. Echo 4/2023 with preserved LV function with AV with stable gradients and no AI. For now will have him stop his atorvastatin for 30 days to see if improves and will be seen by dermatology. Will like eventually to change to rosuvastatin, but will need to work with dermatology to be sure we can start rosuvastatin. If needed can instead move to [id number].   Plan  1. aortic aneurysm- now s/p Ascending aortic replacement with 30 mm Gelweave conduit..   2. Hypertension- continue Losartan 100 mg in the PM and and carvedilol 25 mg bid and HCTZ 25 mg daily and amlodipine 2.5 mg daily. Home BP and hr checks and log for review.  3. CAD now s/p CABG- aspirin 81 mg daily,  losartan and carvedilol. Statin on hold due to rash for now.  4. Hyperlipidemia- target LDL < 55. Currently LDL was at target. ON hold due to allergic skin rash. Working with  dermatology. Will likely eventually change to rosuvastatin.  5. to use antibiotic prophylaxis prior to dental work ( azithromycin due to PCN allergy)  6. Was found to be hypothyroid to explain brittle hair and nails. Now on appropriate dose of levothyroxine with clinical improvement  7. Try to slowly resume exercise as tolerated within orthopedic limitations ( knee pain).   return to clinic in 5-6 weeks virtually to see if rash resolving off atorvastatin and later return to clinic in person in 3 months.        Patient Instructions:  If you have any questions or need cardiac medication refills, please call my office at [phone],      To reach my office please call [phone]  To schedule an appointment call [phone].          ____________________________________________________________  Mara Estrella MD  Division of Cardiovascular Medicine  [facility]  [facility]

## 2023-12-11 NOTE — PATIENT INSTRUCTIONS
1. Hypertension - BP appears adequately controlled today ( not checking at home) . Will continue carvedilol 25 mg bid, HCTZ 25 mg daily and losartan 100 mg daily and amlodipine 2.5 mg daily. Please check Bp and HR at home and log results for review.   2. post op from ascending aortic conduit and CABG- continue aspirin and atorvastatin 40 mg daily.   3. Hyperlipidemia- target LDL < 55. Had previously been at target on atorvastatin 40 mg daily however pt has a drug rash and dermatology feels it is most likely due atorvastatin. Will take a 30 day break from atorvastatin. If the rash resolves ( pt to see dermatology after 30 days will likely start rosuvastatin. ( Pt to check with dermatology to see if they can drug test for rosuvastatin.  4. prior to dental work to use antibiotic prophylaxis- to take azithromycin 500 mg 30-60 min prior to dental work ( PCN allergy)  5. Consider increasing level of exercise , though gradually and not to the level as before that caused significant knee pain.   Return for virtual follow up in 5 weeks then routine clinic appointment in 3 months.

## 2024-01-21 NOTE — PROGRESS NOTES
Primary Care Physician: Geraldine Marsh MD      Date of Visit: 01/22/2024 10:00 AM EST  Location of visit: [facility]   Type of Visit: Established Patient     HPI / Summary:   Patient is a 73 year old man with h/o HTN with aortic aneurysm (4.5 -4.6 cm) with coronary artery calcifications fond to have 3 vessel disease who underwent CABG x 4 and ascending aortic replacement with 30mm Gelweave graft on May 5 2022 who returns virtually for followup     CAD risk factors: former smoker, no DM, no FHx of CAD, mildly elevated lipids  Pt with moderately dilated ascending aorta which appears to be stable in size since 2019. Was also found to have coronary artery calcifications. Echo shows trileaflet aortic valve.  April 24 2022: CTA with heart flow : CAC total 514.8 ( .5, LCx 0, .3  LM no significant disease, LAD: proximal to mid focal 70% stenosis, Ramus with proximal to mid focal calcific stenosis up to 70%, RCA focal calcific plaque. Ascending aorta 4..6 cm. CT FFR showed mid LAD stenosis FF 0.57, OM1 0.62, distal RCA 0,9  Cardiac cath 4/29/2022: ( [facility]) - LAD proximal 80%, OM1 90%, Ramus 80%, RPL proximal 50%  5/4/2022; CABG x 4( LIMA-LAD, TBPX-SLIIB-IDy, Radial-RCA. and AA replacement with 30mm Gelweave graft ( [facility])   Hospital course was uneventful and pt was d/c to home to continue imdur for 1 month for radial artery ( to prevent spasm). d/c to home 5/11/2022     Pt completed cardiac rehab.MARIA M locke lives in fourth floor walkup and has no issues walking up to home.  Also uses antibiotic prophylaxis for dental work ( azithromycin d/t PCN allergy).  Had been walking on treadmill 30 minutes daily  without CP or SOB. However after awhile started to have knee pain so currently doing no dedicated exercise though does go up his 4 flights to home. . Denies any palpitations presyncope or syncope. He is not checking his BP at home though recently at primary [facility] office was 115/70mmHg. Pt developed a diffuse skin  "rash over the summer after being in the sun. He had been wearing sun screen, Initially thought sun triggered eczema, and tried several ointments without improvement. Eventually saw dermatology and had 2 biopsies, and was put on very high dose prednisone ( 40 mg daily for 2 weeks). Dermatology felt it was due to an allergy. They thought most likely due to allergy to his statin. In addition, prior to starting the prednisone his skin was very sensitive, which did improve when on prednisone Once he stopped the prednisone  1 week later had diffuse muscle pains. He had gained weight while on the prednisone ( had been eating a lot more) but his wait is reportedly back to baseline. Now that he has been off prednisone he feels his skin is getting sensitivite again. Pt states that dermatology wants him to be off the statin for 30 days and will then check him to see if that was cause of his rash.    Recently pts Bp running 120s-130s/60mmHg . Pt has no myalgias though has had right leg pain, possibly from back. He has now been off his stating fro about 40 days. Despite being off statin, as he got further from when he was treated with prednisone his rash returned and worsened, so he was restarted on prednisone by derm. They felt still could be due to his statin\" may take a long time to fully get out of system. He sees oTna Olvera at [facility]. ( Office number [phone]. Interestingly his skin sensitivity appears to have resolved. He has never seen an allergist regarding this rash, considered allergic. He denies CP or SOB, except mild DINERO when going upstairs ( lives on 4 th floor walk up, and since having knee pain and now right leg pain has been much less active than prior, so feels a little deconditioned. )     ROS: Full 10 pt review of symptoms of negative unless discussed above.     Problems:   Patient Active Problem List   Diagnosis    Benign essential HTN    Abnormal laboratory test    Aortic aneurysm, thoracic " (CMS/HCC)    Benign neoplasm of skin    Bilateral sensorineural hearing loss    CAD (coronary artery disease)    COVID-19 virus infection    Epidermal cyst    Fullness in ear, bilateral    Hypothyroidism    Left wrist pain    Left wrist tendonitis    Lesion of skin of scalp    Myopia, bilateral    Osteoarthritis of carpometacarpal (CMC) joint of thumb    Other hypertrophic disorders of the skin    Pain of both elbows    Hypertension    Pseudogout of right wrist    S/P ascending aortic replacement    S/P CABG x 4    S/P colonoscopy with polypectomy    Serous otitis media    Single seizure (CMS/HCC)    Other seborrheic keratosis    Thrombocytopenia (CMS/HCC)    Presbyopia of both eyes    Age-related nuclear cataract of both eyes     Medical History:   Past Medical History:   Diagnosis Date    Heart disease     Personal history of other benign neoplasm     History of benign neoplasm of skin    Unspecified convulsions (CMS/HCC)     Single seizure     Surgical Hx:   Past Surgical History:   Procedure Laterality Date    CARDIAC SURGERY      CT ANGIO CORONARY ART WITH HEARTFLOW IF SCORE >30%  04/19/2022    CT HEART CORONARY ANGIOGRAM 4/19/2022 [facility] [place] [facility]      Social Hx:   Tobacco Use: Medium Risk (12/17/2023)    Patient History     Smoking Tobacco Use: Former     Smokeless Tobacco Use: Never     Passive Exposure: Not on file     Alcohol Use: Not At Risk (10/30/2023)    AUDIT-C     Frequency of Alcohol Consumption: Never     Average Number of Drinks: Patient does not drink     Frequency of Binge Drinking: Never     Family Hx:   Family History   Problem Relation Name Age of Onset    Hypertension Mother Latosha       Exam:   Vitals: There were no vitals filed for this visit. BP today at home 130/66mmHg HR 60 bpm.   Wt Readings from Last 5 Encounters:   12/11/23 96.2 kg (212 lb 1.6 oz)   10/30/23 96 kg (211 lb 9.6 oz)   10/04/23 96.4 kg (212 lb 9.6 oz)   08/15/23 96.6 kg (213 lb)   06/19/23 94.8 kg (209 lb 0.8 oz)       Constitutional:       Appearance: Healthy appearance. Not in distress.   Pulmonary:      Effort: Pulmonary effort is normal.   Edema:     Peripheral edema absent.   Neurological:      Mental Status: Alert and oriented to person, place, and time.       Labs:   Recent Labs     12/07/23  0722 06/20/23  0705 05/26/22  1017 05/11/22  0625 05/10/22  0608 05/09/22  0621 05/08/22  0647 05/07/22  0726   WBC 5.1 3.7* 3.8* 7.5 6.3 7.5 11.1 15.7*   HGB 13.7 15.0 10.1* 8.8* 8.3* 8.7* 8.6* 9.7*   HCT 41.1 45.0 32.9* 25.6* 25.7* 25.8* 25.5* 27.9*   * 101* 299 166 186 75* 132* 128*   MCV 85 84 88 85 91 86 90 87     Recent Labs     12/07/23  0722 06/20/23  0705 09/07/22  0834 08/05/22  0815 07/27/22  1032 05/26/22  1017 05/11/22  0625 05/10/22  0608    139 136 137 136 135* 135* 138   K 3.6 3.7 4.6 4.6 4.9 4.1 3.7 3.4*    100 97* 97* 98 97* 99 100   BUN 15 14 12 11 11 11 12 17   CREATININE 0.84 0.77 0.75 0.77 0.73 0.67 0.54 0.53        Lab Results   Component Value Date    CHOL 102 12/07/2023    HDL 36.7 12/07/2023    LDLF 54 06/20/2023    TRIG 65 12/07/2023   LDL on 12/7/2023: 52  Notable Studies: imaging personally reviewed and summarized by me below  EKG:  Encounter Date: 12/11/23   ECG 12 lead (Clinic Performed)   Result Value    Ventricular Rate 72    Atrial Rate 72    ID Interval 178    QRS Duration 76    QT Interval 394    QTC Calculation(Bazett) 431    P Axis 54    R Axis 63    T Axis 35    QRS Count 12    Q Onset 223    P Onset 134    P Offset 198    T Offset 420    QTC Fredericia 418    Narrative    Normal sinus rhythm  Possible Left atrial enlargement  Septal infarct , age undetermined  Abnormal ECG  Confirmed by Carlos Qureshi ([id number]) on 12/11/2023 10:25:16 AM       Echo:  - Echo (4/5/2023)  PHYSICIAN INTERPRETATION:  Left Ventricle: The left ventricular systolic function is normal, with an estimated ejection fraction of 60-65%. The calculated ejection fraction is normal at 63 % using the Hu's  Bi-plane MOD calculation. There are no regional wall motion abnormalities. The left ventricular cavity size is normal. The left ventricular septal wall thickness is mildly increased. There is normal left ventricular posterior wall thickness. Abnormal (paradoxical) septal motion consistent with post-operative status. Spectral Doppler shows a normal pattern of left ventricular diastolic filling.  Left Atrium: The left atrium is moderately dilated. There has been an interval increase in the Left Atrial Volume Index from38.4 ml/m2 to 47.1 ml/m2.  Right Ventricle: The right ventricle is normal in size. There is normal right ventricular global systolic function.  Right Atrium: The right atrium is mildly dilated.  Aortic Valve: The aortic valve is trileaflet. There is no evidence of aortic valve regurgitation. The peak instantaneous gradient of the aortic valve is 8.5 mmHg. The mean gradient of the aortic valve is 3.0 mmHg.  Mitral Valve: The mitral valve is mildly thickened. There is trace mitral valve regurgitation.  Tricuspid Valve: The tricuspid valve is structurally normal. There is mild tricuspid regurgitation. The Doppler estimated RVSP is mildly elevated at 39.7 mmHg.  Pulmonic Valve: The pulmonic valve is structurally normal. There is physiologic pulmonic valve regurgitation.  Pericardium: There is no pericardial effusion noted.  Aorta: The aortic root is abnormal. There is no dilatation of the ascending aorta. There is no dilatation of the aortic root. There is a prosthetic graft seen in the position of the ascending aorta. Aorta is known to be a 30 mm Gelweave Graft placed 5/5/2022.  Systemic Veins: The inferior vena cava appears to be of normal size.  In comparison to the previous echocardiogram(s): Compared with study from 2/23/2022,. Patient is known to have undergone CABG with Ascending Aorta Replacement. LVEF remains stable. Mild Increase in RVSP.        CONCLUSIONS:  1. Left ventricular systolic function  is normal with a 60-65% estimated ejection fraction.  2. Abnormal septal motion consistent with post-operative status.  3. The left atrium is moderately dilated.  4. Mildly elevated RVSP.  5. Prosthetic graft in the ascending aorta position.  6. Patient is known to have undergone CABG with Ascending Aorta Replacement. LVEF remains stable. Mild Increase in RVSP.      Current Outpatient Medications   Medication Instructions    acetaminophen (Tylenol) 325 mg tablet oral, Every 6 hours    amLODIPine (NORVASC) 2.5 mg, oral, Daily    aspirin 81 mg EC tablet 1 tablet, oral, Daily    atorvastatin (Lipitor) 40 mg tablet 1 tablet, oral, Daily    carvedilol (Coreg) 25 mg tablet 1 tablet, oral, 2 times daily    fish oil concentrate (Omega-3) 120-180 mg capsule 1 capsule, oral, Daily    hydroCHLOROthiazide (HYDRODiuril) 25 mg tablet 1 tablet, oral, Daily    levothyroxine (Synthroid, Levoxyl) 137 mcg tablet 1 tablet, oral, Daily    losartan (Cozaar) 100 mg tablet oral     Impressions and Plan:    Patient is a 73 year old man with HTN and a moderately dilated aortic aneurysm found to have extensive coronary artery calcifications found to have 3 vessel disease and is post op from CABG x 4 with ascending aortic replacement with 30mm Gelweave conduit. He is doing quite well from a cardiac perspective , exercising  without without CP or SOB, until he started having knee pain. He remains asymptomatic with his ADLS except mild DINERO going up to his four floor walk up apartment now that he has been less active .  He had no myalgias on his atorvastatin, however had devleoped a total body rash and saw a dermatologist who biopsied it who felt this was rash due to an allergy and felt this was due to his atorvastatin. He was initially treated with 2 weeks of prednisone  and was kept off the atorvastatin and initially the rash markedly improved. After stopping the prednisone the rash has come back despite being off the atorvastatin> He has been  off atorvastatin approximately 40 days now. I will discuss this with his dermatologist since it seems a bit unusual that his rash returned despite being off his statin for 40 days. Will also consult an allergist at  for help.Should be on a lipid lowering medication given his prior CABG with target LDL < 55. Can start a PCSK9i if indeed he is allergic to statins ( not sure if it is a class effect or even if that is the agent he is allergic to.- if not class effect could change from atorvastatin to rosuvastatin). Unclear if would need allergy testing.  Will also see if allergy can help decided upon timing on starting a new medication in the face of a drug rash. He will remain on his aspirin. His BP appears to be adequately controlled. Echo 4/2023 with preserved LV function with AV with stable gradients and no AI.   Plan  1. Aortic aneurysm- now s/p Ascending aortic replacement with 30 mm Gelweave conduit. All stable on CTA 2/23. At this point can move to every 2 year follow up CTA. ( Will be due in 2/2025). To use antibiotic prophylaxis prior to dental work( azithromycin due to PCN allergy)   2. Hypertension- continue Losartan 100 mg in the PM and and carvedilol 25 mg bid and HCTZ 25 mg daily and amlodipine 2.5 mg daily. Home BP and hr checks and log for review.  3. CAD now s/p CABG- aspirin 81 mg daily,  losartan and carvedilol. Statin on hold due to rash for now.  4. Hyperlipidemia- target LDL < 55. On his prior dose of atorvastatin LDL was at target. ON hold due to allergic skin rash. Working with dermatology. Will consider  change to rosuvastatin vs PCSK9i. Will hope allergy consult can [facility] give guidance.   5. to use antibiotic prophylaxis prior to dental work ( azithromycin due to PCN allergy)  6. Was found to be hypothyroid to explain brittle hair and nails. Now on appropriate dose of levothyroxine with clinical improvement  7. Try to slowly resume exercise as tolerated within orthopedic limitations ( knee  pain).   Return to clinic at previously scheduled appointment time-  March 14 2024    Addendum  Spoke to dermatologist on 1/24/2024: pt had 2 biopsies of his rash and direct immunofluorescence stain and H and E ruled out vasculitis and autoimmune rash. 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 At this point since off statin for 30 days and still having rash, she felt the statin was NOT the cause and safe to go back on the statin , or also ok to switch to another statin. Will start rosuvastatin  20 mg daily and recheck lipids in 6 weeks and titrate as needed for LDL target < 55. In addition she felt that hydrochlorothiazide may be the culprit since very commonly causes rashes. We have agreed to take him off the hydrochlorothiazide and will try increasing his amlodipine to 5 mg daily to keep BP adequately controlled. She also is pleased we will be involving allergist. Discussed these medications changes with the patient who agrees.     Patient Instructions:  If you have any questions or need cardiac medication refills, please call my office at [phone],      To reach my office please call [phone]  To schedule an appointment call [phone].          ____________________________________________________________  Mara Estrella MD  Division of Cardiovascular Medicine  [facility] [place]  [facility]

## 2024-01-22 ENCOUNTER — TELEMEDICINE (OUTPATIENT)
Dept: CARDIOLOGY | Facility: HOSPITAL | Age: 74
End: 2024-01-22
Payer: MEDICARE

## 2024-01-22 DIAGNOSIS — Z95.828 S/P ASCENDING AORTIC REPLACEMENT: ICD-10-CM

## 2024-01-22 DIAGNOSIS — R21 SKIN RASH: ICD-10-CM

## 2024-01-22 DIAGNOSIS — Z95.1 S/P CABG X 4: ICD-10-CM

## 2024-01-22 DIAGNOSIS — I25.10 CORONARY ARTERY DISEASE INVOLVING NATIVE HEART WITHOUT ANGINA PECTORIS, UNSPECIFIED VESSEL OR LESION TYPE: ICD-10-CM

## 2024-01-22 DIAGNOSIS — I10 BENIGN ESSENTIAL HTN: Primary | ICD-10-CM

## 2024-01-22 PROCEDURE — 99214 OFFICE O/P EST MOD 30 MIN: CPT | Performed by: INTERNAL MEDICINE

## 2024-01-22 ASSESSMENT — ENCOUNTER SYMPTOMS
OCCASIONAL FEELINGS OF UNSTEADINESS: 0
DEPRESSION: 0
LOSS OF SENSATION IN FEET: 0

## 2024-01-24 RX ORDER — AZITHROMYCIN 500 MG/1
500 TABLET, FILM COATED ORAL ONCE
Qty: 1 TABLET | Refills: 3 | Status: SHIPPED | OUTPATIENT
Start: 2024-01-24 | End: 2024-01-24

## 2024-01-24 RX ORDER — ROSUVASTATIN CALCIUM 20 MG/1
20 TABLET, COATED ORAL DAILY
Qty: 30 TABLET | Refills: 11 | Status: SHIPPED | OUTPATIENT
Start: 2024-01-24 | End: 2024-03-14 | Stop reason: SDUPTHER

## 2024-01-24 RX ORDER — AMLODIPINE BESYLATE 5 MG/1
5 TABLET ORAL DAILY
Qty: 30 TABLET | Refills: 11 | Status: SHIPPED | OUTPATIENT
Start: 2024-01-24 | End: 2024-03-14 | Stop reason: SDUPTHER

## 2024-01-24 NOTE — PATIENT INSTRUCTIONS
1. Hypertension - BP appears adequately controlled today and at home) . Will continue carvedilol 25 mg bid, HCTZ 25 mg daily and losartan 100 mg daily and amlodipine 2.5 mg daily. Please check BP and HR at home and log results for review.   2. post op from ascending aortic conduit and CABG- continue aspirin (atorvastatin currently on hold) Will be due for repeat CTA for aortic conduit in 2/2025.   3. Hyperlipidemia- target LDL < 55. Had previously been at target on atorvastatin 40 mg daily however pt has a drug rash and dermatology feels it is most likely due atorvastatin. Will consult allergy for further assessment and possibly allergy testing. May consider changing to rosuvastatin if not class effect, vs PCSK9i if allergic to statins.  4. prior to dental work to use antibiotic prophylaxis- to take azithromycin 500 mg 30-60 min prior to dental work ( PCN allergy)  5. Consider increasing level of exercise , though gradually and not to the level as before that caused significant knee pain.   Return to clinic in March as previously scheduled.     Addendum- will hold hydrochlorothiazide for 30 days re concern may be cause of his drug rash. Will increase amlodipine to 5 mg daily. Will start rosuvastatin 20 mg daily and recheck lipids in 6 weeks. This is all following discussion with  dermatologist.

## 2024-02-12 DIAGNOSIS — I10 ESSENTIAL HYPERTENSION: Primary | ICD-10-CM

## 2024-02-13 RX ORDER — LOSARTAN POTASSIUM 100 MG/1
TABLET ORAL
Qty: 90 TABLET | Refills: 3 | Status: SHIPPED | OUTPATIENT
Start: 2024-02-13

## 2024-02-14 ENCOUNTER — TELEPHONE (OUTPATIENT)
Dept: SCHEDULING | Age: 74
End: 2024-02-14
Payer: MEDICARE

## 2024-02-14 DIAGNOSIS — I10 ESSENTIAL HYPERTENSION: Primary | ICD-10-CM

## 2024-02-14 DIAGNOSIS — Z95.1 S/P CABG X 4: Primary | ICD-10-CM

## 2024-02-14 DIAGNOSIS — I10 BENIGN ESSENTIAL HTN: ICD-10-CM

## 2024-02-14 RX ORDER — LOSARTAN POTASSIUM 100 MG/1
TABLET ORAL
Qty: 90 TABLET | Refills: 3 | Status: CANCELLED | OUTPATIENT
Start: 2024-02-14

## 2024-02-15 RX ORDER — CARVEDILOL 25 MG/1
25 TABLET ORAL 2 TIMES DAILY
Qty: 180 TABLET | Refills: 3 | Status: SHIPPED | OUTPATIENT
Start: 2024-02-15 | End: 2025-02-14

## 2024-03-11 ENCOUNTER — LAB (OUTPATIENT)
Dept: LAB | Facility: LAB | Age: 74
End: 2024-03-11
Payer: MEDICARE

## 2024-03-11 DIAGNOSIS — I25.10 CORONARY ARTERY DISEASE INVOLVING NATIVE HEART WITHOUT ANGINA PECTORIS, UNSPECIFIED VESSEL OR LESION TYPE: ICD-10-CM

## 2024-03-11 DIAGNOSIS — Z95.1 S/P CABG X 4: ICD-10-CM

## 2024-03-11 LAB
CHOLEST SERPL-MCNC: 90 MG/DL (ref 0–199)
CHOLESTEROL/HDL RATIO: 2.4
HDLC SERPL-MCNC: 38.2 MG/DL
LDLC SERPL CALC-MCNC: 41 MG/DL
NON HDL CHOLESTEROL: 52 MG/DL (ref 0–149)
TRIGL SERPL-MCNC: 53 MG/DL (ref 0–149)
VLDL: 11 MG/DL (ref 0–40)

## 2024-03-11 PROCEDURE — 36415 COLL VENOUS BLD VENIPUNCTURE: CPT

## 2024-03-11 PROCEDURE — 80061 LIPID PANEL: CPT

## 2024-03-14 ENCOUNTER — OFFICE VISIT (OUTPATIENT)
Dept: CARDIOLOGY | Facility: HOSPITAL | Age: 74
End: 2024-03-14
Payer: MEDICARE

## 2024-03-14 VITALS
WEIGHT: 209 LBS | SYSTOLIC BLOOD PRESSURE: 147 MMHG | BODY MASS INDEX: 29.92 KG/M2 | DIASTOLIC BLOOD PRESSURE: 72 MMHG | HEART RATE: 62 BPM | OXYGEN SATURATION: 97 % | HEIGHT: 70 IN

## 2024-03-14 DIAGNOSIS — E78.5 DYSLIPIDEMIA: ICD-10-CM

## 2024-03-14 DIAGNOSIS — Z95.1 S/P CABG X 4: ICD-10-CM

## 2024-03-14 DIAGNOSIS — I10 BENIGN ESSENTIAL HTN: Primary | ICD-10-CM

## 2024-03-14 DIAGNOSIS — Z95.828 S/P ASCENDING AORTIC REPLACEMENT: ICD-10-CM

## 2024-03-14 DIAGNOSIS — I25.10 CORONARY ARTERY DISEASE INVOLVING NATIVE HEART WITHOUT ANGINA PECTORIS, UNSPECIFIED VESSEL OR LESION TYPE: ICD-10-CM

## 2024-03-14 PROCEDURE — 99214 OFFICE O/P EST MOD 30 MIN: CPT | Performed by: INTERNAL MEDICINE

## 2024-03-14 PROCEDURE — 3077F SYST BP >= 140 MM HG: CPT | Performed by: INTERNAL MEDICINE

## 2024-03-14 PROCEDURE — 1036F TOBACCO NON-USER: CPT | Performed by: INTERNAL MEDICINE

## 2024-03-14 PROCEDURE — 3078F DIAST BP <80 MM HG: CPT | Performed by: INTERNAL MEDICINE

## 2024-03-14 PROCEDURE — 1160F RVW MEDS BY RX/DR IN RCRD: CPT | Performed by: INTERNAL MEDICINE

## 2024-03-14 PROCEDURE — 1159F MED LIST DOCD IN RCRD: CPT | Performed by: INTERNAL MEDICINE

## 2024-03-14 PROCEDURE — 1157F ADVNC CARE PLAN IN RCRD: CPT | Performed by: INTERNAL MEDICINE

## 2024-03-14 RX ORDER — AMLODIPINE BESYLATE 10 MG/1
10 TABLET ORAL DAILY
Qty: 90 TABLET | Refills: 3 | Status: SHIPPED | OUTPATIENT
Start: 2024-03-14 | End: 2025-03-14

## 2024-03-14 RX ORDER — ROSUVASTATIN CALCIUM 20 MG/1
20 TABLET, COATED ORAL DAILY
Qty: 90 TABLET | Refills: 3 | Status: SHIPPED | OUTPATIENT
Start: 2024-03-14 | End: 2025-03-14

## 2024-03-14 NOTE — PATIENT INSTRUCTIONS
1. Aortic aneurysm- now s/p Ascending aortic replacement with 30 mm Gelweave conduit. All stable on CTA 2/23. At this point can move to every 2 year follow up CTA. ( Will be due in 2/2025). To use antibiotic prophylaxis prior to dental work( azithromycin due to PCN allergy)   2. Hypertension- BP running high these days ( possibly due to  being on prednisone) To continue Losartan 100 mg in the PM and and carvedilol 25 mg bid and will increase amlodipine to 10 mg daily. Can take 2 of your 5 mg tablets daily until current supply is finished. Home BP and HR checks and log for review. His skin rash resolved on prednisone with discontinuing hydrochlorothiazide.  3. CAD now s/p CABG- aspirin 81 mg daily,  losartan,carvedilol and rosuvastatin 20 mg daily  4. Hyperlipidemia- target LDL < 55. Determined that was not allergic to atorvastatin, but did change over to rosuvastatin 20 mg daily. At target LDL on rosuvastatin 20 mg daily ( LDL 41)   5. to use antibiotic prophylaxis prior to dental work ( azithromycin due to PCN allergy)  6. Was found to be hypothyroid to explain brittle hair and nails. Now on appropriate dose of levothyroxine with clinical improvement  7. Try to slowly resume exercise as tolerated within orthopedic limitations ( knee pain).  Return to clinic in 3 months

## 2024-03-14 NOTE — PROGRESS NOTES
Primary Care Physician: Geraldine Marsh MD      Date of Visit: 03/14/2024  8:20 AM EDT  Location of visit: [facility]   Type of Visit: Established Patient     HPI / Summary:   Patient is a 73 year old man with h/o HTN with aortic aneurysm (4.5 -4.6 cm) with coronary artery calcifications fond to have 3 vessel disease who underwent CABG x 4 and ascending aortic replacement with 30mm Gelweave graft on May 5 2022 who returns  for followup     CAD risk factors: former smoker, no DM, no FHx of CAD, mildly elevated lipids  Pt with moderately dilated ascending aorta which appears to be stable in size since 2019. Was also found to have coronary artery calcifications. Echo shows trileaflet aortic valve.  April 24 2022: CTA with heart flow : CAC total 514.8 ( .5, LCx 0, .3  LM no significant disease, LAD: proximal to mid focal 70% stenosis, Ramus with proximal to mid focal calcific stenosis up to 70%, RCA focal calcific plaque. Ascending aorta 4..6 cm. CT FFR showed mid LAD stenosis FF 0.57, OM1 0.62, distal RCA 0,9  Cardiac cath 4/29/2022: ( Vee) - LAD proximal 80%, OM1 90%, Ramus 80%, RPL proximal 50%  5/4/2022; CABG x 4( LIMA-LAD, [id number], Radial-RCA. and AA replacement with 30mm Gelweave graft ( GIO Estrella)   Hospital course was uneventful and pt was d/c to home to continue imdur for 1 month for radial artery ( to prevent spasm). d/c to home 5/11/2022     Patient was doing very well though in the fall developed a total body rash which was eventually biopsied by dermatology who felt this was a drug rash. Initially they thought was due to his statin so he reains off his statin for 1 month and was treated with high dose prednisone. The rash peristed despite being off statin for over 30 days, so that was resumed and later decided it was due to his hydrochlorothiazide which has since been stopped. His rash is now resolving.     He lives in fourth floor walkup and has no issues walking up to home.   Also uses antibiotic prophylaxis for dental work ( azithromycin d/t PCN allergy).  Previously had been exercising  without CP or SOB. However now has significant  knee pain so currently doing no dedicated exercise though does go up his 4 flights to home.Denies any palpitations presyncope or syncope. He remains on a lower dose of prednisone which is now getting tapered. His BP at home now running 135-150/70s-80smmHg. This is likely due to a combination of being on prednisone and being off the hydrochlorothiazide. His weight has been stable. Has no LE swelling on current dose of 5 mg amlodipine Has no myalgias on his statin.   He is due to see an allergist next month.         ROS: Full 10 pt review of symptoms of negative unless discussed above.     Problems:   Patient Active Problem List   Diagnosis    Benign essential HTN    Abnormal laboratory test    Aortic aneurysm, thoracic (CMS/HCC)    Benign neoplasm of skin    Bilateral sensorineural hearing loss    CAD (coronary artery disease)    COVID-19 virus infection    Epidermal cyst    Fullness in ear, bilateral    Hypothyroidism    Left wrist pain    Left wrist tendonitis    Lesion of skin of scalp    Myopia, bilateral    Osteoarthritis of carpometacarpal (CMC) joint of thumb    Other hypertrophic disorders of the skin    Pain of both elbows    Hypertension    Pseudogout of right wrist    S/P ascending aortic replacement    S/P CABG x 4    S/P colonoscopy with polypectomy    Serous otitis media    Single seizure (CMS/HCC)    Other seborrheic keratosis    Thrombocytopenia (CMS/HCC)    Presbyopia of both eyes    Age-related nuclear cataract of both eyes     Medical History:   Past Medical History:   Diagnosis Date    Heart disease     Personal history of other benign neoplasm     History of benign neoplasm of skin    Unspecified convulsions (CMS/HCC)     Single seizure     Surgical Hx:   Past Surgical History:   Procedure Laterality Date    CARDIAC SURGERY      CT  "ANGIO CORONARY ART WITH HEARTFLOW IF SCORE >30%  04/19/2022    CT HEART CORONARY ANGIOGRAM 4/19/2022 [facility] [place] [facility]      Social Hx:   Tobacco Use: Medium Risk (3/14/2024)    Patient History     Smoking Tobacco Use: Former     Smokeless Tobacco Use: Never     Passive Exposure: Not on file     Alcohol Use: Not At Risk (10/30/2023)    AUDIT-C     Frequency of Alcohol Consumption: Never     Average Number of Drinks: Patient does not drink     Frequency of Binge Drinking: Never     Family Hx:   Family History   Problem Relation Name Age of Onset    Hypertension Mother Latosha       Exam:   Vitals:   Vitals:    03/14/24 0831   BP: 147/72   BP Location: Right arm   Patient Position: Sitting   BP Cuff Size: Adult   Pulse: 62   SpO2: 97%   Weight: 94.8 kg (209 lb)   Height: 1.778 m (5' 10\")     Wt Readings from Last 5 Encounters:   03/14/24 94.8 kg (209 lb)   12/11/23 96.2 kg (212 lb 1.6 oz)   10/30/23 96 kg (211 lb 9.6 oz)   10/04/23 96.4 kg (212 lb 9.6 oz)   08/15/23 96.6 kg (213 lb)      Constitutional:       Appearance: Healthy appearance. Not in distress.   Pulmonary:      Effort: Pulmonary effort is normal.      Breath sounds: Normal breath sounds.   Cardiovascular:      PMI at left midclavicular line. Normal rate. Regular rhythm. S1 with normal intensity. S2 with normal intensity.       Murmurs: There is no murmur.   Pulses:     Intact distal pulses.   Edema:     Peripheral edema absent.   Skin:     Comments: Raised rah appears to be resolving.    Neurological:      Mental Status: Alert and oriented to person, place, and time.       Labs:   Recent Labs     12/07/23  0722 06/20/23  0705 05/26/22  1017 05/11/22  0625 05/10/22  0608 05/09/22  0621 05/08/22  0647 05/07/22  0726   WBC 5.1 3.7* 3.8* 7.5 6.3 7.5 11.1 15.7*   HGB 13.7 15.0 10.1* 8.8* 8.3* 8.7* 8.6* 9.7*   HCT 41.1 45.0 32.9* 25.6* 25.7* 25.8* 25.5* 27.9*   * 101* 299 166 186 75* 132* 128*   MCV 85 84 88 85 91 86 90 87     Recent Labs     " 12/07/23  0722 06/20/23  0705 09/07/22  0834 08/05/22  0815 07/27/22  1032 05/26/22  1017 05/11/22  0625 05/10/22  0608    139 136 137 136 135* 135* 138   K 3.6 3.7 4.6 4.6 4.9 4.1 3.7 3.4*    100 97* 97* 98 97* 99 100   BUN 15 14 12 11 11 11 12 17   CREATININE 0.84 0.77 0.75 0.77 0.73 0.67 0.54 0.53        Lab Results   Component Value Date    CHOL 90 03/11/2024    HDL 38.2 03/11/2024    LDLF 54 06/20/2023    TRIG 53 03/11/2024   LDL 3/11/2024; 41  Notable Studies: imaging personally reviewed and summarized by me below  EKG:  Encounter Date: 12/11/23   ECG 12 lead (Clinic Performed)   Result Value    Ventricular Rate 72    Atrial Rate 72    NE Interval 178    QRS Duration 76    QT Interval 394    QTC Calculation(Bazett) 431    P Axis 54    R Axis 63    T Axis 35    QRS Count 12    Q Onset 223    P Onset 134    P Offset 198    T Offset 420    QTC Fredericia 418    Narrative    Normal sinus rhythm  Possible Left atrial enlargement  Septal infarct , age undetermined  Abnormal ECG  Confirmed by Carlos Qureshi ([id number]) on 12/11/2023 10:25:16 AM       Echo:  - Echo (4/5/2023)  PHYSICIAN INTERPRETATION:  Left Ventricle: The left ventricular systolic function is normal, with an estimated ejection fraction of 60-65%. The calculated ejection fraction is normal at 63 % using the Hu's Bi-plane MOD calculation. There are no regional wall motion abnormalities. The left ventricular cavity size is normal. The left ventricular septal wall thickness is mildly increased. There is normal left ventricular posterior wall thickness. Abnormal (paradoxical) septal motion consistent with post-operative status. Spectral Doppler shows a normal pattern of left ventricular diastolic filling.  Left Atrium: The left atrium is moderately dilated. There has been an interval increase in the Left Atrial Volume Index from38.4 ml/m2 to 47.1 ml/m2.  Right Ventricle: The right ventricle is normal in size. There is normal right  ventricular global systolic function.  Right Atrium: The right atrium is mildly dilated.  Aortic Valve: The aortic valve is trileaflet. There is no evidence of aortic valve regurgitation. The peak instantaneous gradient of the aortic valve is 8.5 mmHg. The mean gradient of the aortic valve is 3.0 mmHg.  Mitral Valve: The mitral valve is mildly thickened. There is trace mitral valve regurgitation.  Tricuspid Valve: The tricuspid valve is structurally normal. There is mild tricuspid regurgitation. The Doppler estimated RVSP is mildly elevated at 39.7 mmHg.  Pulmonic Valve: The pulmonic valve is structurally normal. There is physiologic pulmonic valve regurgitation.  Pericardium: There is no pericardial effusion noted.  Aorta: The aortic root is abnormal. There is no dilatation of the ascending aorta. There is no dilatation of the aortic root. There is a prosthetic graft seen in the position of the ascending aorta. Aorta is known to be a 30 mm Gelweave Graft placed 5/5/2022.  Systemic Veins: The inferior vena cava appears to be of normal size.  In comparison to the previous echocardiogram(s): Compared with study from 2/23/2022,. Patient is known to have undergone CABG with Ascending Aorta Replacement. LVEF remains stable. Mild Increase in RVSP.        CONCLUSIONS:  1. Left ventricular systolic function is normal with a 60-65% estimated ejection fraction.  2. Abnormal septal motion consistent with post-operative status.  3. The left atrium is moderately dilated.  4. Mildly elevated RVSP.  5. Prosthetic graft in the ascending aorta position.  6. Patient is known to have undergone CABG with Ascending Aorta Replacement. LVEF remains stable. Mild Increase in RVSP.             Current Outpatient Medications   Medication Instructions    acetaminophen (Tylenol) 325 mg tablet oral, Every 6 hours    amLODIPine (NORVASC) 10 mg, oral, Daily    aspirin 81 mg EC tablet 1 tablet, oral, Daily    carvedilol (COREG) 25 mg, oral, 2 times  daily    fish oil concentrate (Omega-3) 120-180 mg capsule 1 capsule, oral, Daily    levothyroxine (Synthroid, Levoxyl) 137 mcg tablet 1 tablet, oral, Daily    losartan (Cozaar) 100 mg tablet TAKE 1 TABLET DAILY IN THE EVENING (DOSE INCREASED, STOP ISOSORBIDE)    rosuvastatin (CRESTOR) 20 mg, oral, Daily     Impressions and Plan:    Patient is a 73 year old man with HTN and a moderately dilated aortic aneurysm found to have extensive coronary artery calcifications found to have 3 vessel disease and is post op from CABG x 4 with ascending aortic replacement with 30mm Gelweave conduit. He is doing quite well from a cardiac perspective  performing ADLS ( including going up 4 floor walkup)  without CP or SOB,though now activities are limited by  knee pain.  He had no myalgias on his atorvastatin Had a total body rash which was eventually determined to be due to his HCTZ which has since been stopped and his prednisone is now being tapered and his rash is resolving  Has an appointment with allergy next month. He is s/p CABG with LDL target< 55 , and since being on rosuvastatin 20 mg daily he is at target.  Echo 4/2023 with preserved LV function with AV with stable gradients and no AI. His BP on the prednisone and off the hydrochlorothiazide is high, so will increase his amlodipine .   Plan  1. Aortic aneurysm- now s/p Ascending aortic replacement with 30 mm Gelweave conduit. All stable on CTA 2/23. At this point can move to every 2 year follow up CTA. ( Will be due in 2/2025). To use antibiotic prophylaxis prior to dental work( azithromycin due to PCN allergy)   2. Hypertension- continue Losartan 100 mg in the PM and and carvedilol 25 mg bid and will increase daily  amlodipine to 10 mg daily. Home BP and hr checks and log for review.  3. CAD now s/p CABG- aspirin 81 mg daily,  losartan and carvedilol. And rosuvastatin.   4. Hyperlipidemia- target LDL < 55. at target on rosuvastatin 20 mg daily.  5. to use antibiotic  prophylaxis prior to dental work ( azithromycin due to PCN allergy)  6. Was found to be hypothyroid to explain brittle hair and nails. Now on appropriate dose of levothyroxine with clinical improvement  7. Try to slowly resume exercise as tolerated within orthopedic limitations ( knee pain).   Return to clinic in 3 months.     Patient Instructions:  If you have any questions or need cardiac medication refills, please call my office at [phone],      To reach my office please call [phone]  To schedule an appointment call [phone].          ____________________________________________________________  Mara Estrella MD  Division of Cardiovascular Medicine  [facility]  [facility]

## 2024-04-04 ENCOUNTER — CONSULT (OUTPATIENT)
Dept: ALLERGY | Facility: CLINIC | Age: 74
End: 2024-04-04
Payer: MEDICARE

## 2024-04-04 VITALS
WEIGHT: 215.4 LBS | HEART RATE: 61 BPM | SYSTOLIC BLOOD PRESSURE: 139 MMHG | BODY MASS INDEX: 30.84 KG/M2 | DIASTOLIC BLOOD PRESSURE: 74 MMHG | OXYGEN SATURATION: 97 % | TEMPERATURE: 97.6 F | HEIGHT: 70 IN

## 2024-04-04 DIAGNOSIS — R21 RASH AND NONSPECIFIC SKIN ERUPTION: ICD-10-CM

## 2024-04-04 PROCEDURE — 99204 OFFICE O/P NEW MOD 45 MIN: CPT | Performed by: ALLERGY & IMMUNOLOGY

## 2024-04-04 NOTE — PATIENT INSTRUCTIONS
It was nice to meet you today     We have referred you to Dermatology for evaluation     Restart your topical therapies     Recommend holding biotin at this time    We would be happy to see you in follow up as needed

## 2024-04-04 NOTE — LETTER
April 5, 2024     Mara Estrella MD  [address]  [facility] [address]    Patient: Jalen Laboy   YOB: 1950   Date of Visit: 4/4/2024       Dear Dr. Mara Estrella MD:    Thank you for referring Jalen Laboy to me for evaluation. Below are my notes for this consultation.  If you have questions, please do not hesitate to call me. I look forward to following your patient along with you.       Sincerely,     Princess MAGY Hendrix MD      CC: No Recipients  ______________________________________________________________________________________    Jalen Laboy presents for initial evaluation today.      Jalen Laboy was seen at the request of Geraldine Marsh MD for a chief complaint of rash; a report with my findings is being sent via written or electronic means to Geraldine Marsh MD with my recommendations for treatment    He is here for a rash that he developed last summer when he was out in the sun.  He is following with [facility] for granulomatous dermatitis.  He was treated with a slow prednisone taper over 5 to 6 weeks and has been off of prednisone since last Wednesday.  His rash has not significantly improved, and he has started to feel sensation of skin sensitivity since being off prednisone.  In terms of medication changes, he discontinued his statin medication for 1 month to see if it would help with the rash, and it did not.  He was started on a different statin medication and rash did not worsen.  He stopped hydrochlorothiazide in December 2023/January 2024 to see if this would help with the rash, however rash persists.  In terms of other medications, he remains on amlodipine, carvedilol, and losartan, as well as levothyroxine which he started in Jan 2023.      In terms of supplements, he is on a multivitamin, fish oil, vitamin D, and biotin (biotin was started in January 2023)     Rhinitis:  Has noticed in past 6-7 years gets mild nasal congestion which he uses OTC  "antihistamine.  He denies history of asthma, eczema, food allergy, venom allergy.    Drug allergy: Was told that he is allergic to penicillin as a young child, not sure of what occurred      Review of Systems   Constitutional: Negative.    HENT: Negative.     Eyes: Negative.    Respiratory: Negative.     Cardiovascular: Negative.    Gastrointestinal: Negative.    Musculoskeletal: Negative.    Skin:  Positive for rash.   Allergic/Immunologic: Negative.    Hematological: Negative.        Vital signs:  /74   Pulse 61   Temp 36.4 °C (97.6 °F)   Ht 1.778 m (5' 10\")   Wt 97.7 kg (215 lb 6.4 oz)   SpO2 97%   BMI 30.91 kg/m²     Physical Exam:  GENERAL: Alert, oriented and in no acute distress.     HEENT: EYES: No conjunctival injection or cobblestoning. Nose: nasal turbinates are not edematous and are not boggy.  There is no mucous stranding, polyps, or blood    noted. EARS: Tympanic membranes are clear. MOUTH: moist and pink with no exudates, ulcers, or thrush. NECK: is supple, without adenopathy.  No upper airway stridor noted.       HEART: regular rate and rhythm.       LUNGS: Clear to auscultation bilaterally. No wheezing, rhonchi or rales.        ABDOMEN: Positive bowel sounds, soft, nontender, nondistended.       EXTREMITIES: No clubbing or edema.        NEURO:  Normal affect.  Gait normal.      SKIN: Erythematous raised papular lesions in clusters on chest, back, arms, legs.  See picture below          Skin biopsy 11/2023:   FINAL DIAGNOSIS    A. Skin, right medial superior chest, punch biopsy:   - Granulomatous dermatitis, see comment.     B.  Skin, left anterior shoulder, punch biopsy (DIF):  - Negative direct immunofluorescence, see comment.     WFB/CR 11/16/2023     Overall, the histologic features are those of a granulomatous dermatitis. The histologic differential diagnosis includes a granulomatous drug eruption, interstitial granuloma annulare, or a ruptured suppurative/granulomatous folliculitis. " Clinical correlation is recommended.     Impression:  1. Rash and nonspecific skin eruption      Assessment and Plan:  Mr. Laboy presents with a granulomatous dermatitis which has been present since summer 2023.  Due to concern for drug-induced rash, he was discontinued on his statin and hydrochlorothiazide to see if there would be benefit.  He also recently finished a 6-week course of prednisone, and rash has not resolved with these interventions.  It is unclear if this rash is  drug-induced, however recommend that he stop his biotin supplement in the event that this could be contributing to rash.  Will refer to  dermatology for further evaluation.    We would be happy to see him in follow-up as needed.

## 2024-04-04 NOTE — PROGRESS NOTES
"Jalen Laboy presents for initial evaluation today.      Jalen Laboy was seen at the request of Geraldine Marsh MD for a chief complaint of rash; a report with my findings is being sent via written or electronic means to Geraldine Marsh MD with my recommendations for treatment    He is here for a rash that he developed last summer when he was out in the sun.  He is following with [facility] for granulomatous dermatitis.  He was treated with a slow prednisone taper over 5 to 6 weeks and has been off of prednisone since last Wednesday.  His rash has not significantly improved, and he has started to feel sensation of skin sensitivity since being off prednisone.  In terms of medication changes, he discontinued his statin medication for 1 month to see if it would help with the rash, and it did not.  He was started on a different statin medication and rash did not worsen.  He stopped hydrochlorothiazide in December 2023/January 2024 to see if this would help with the rash, however rash persists.  In terms of other medications, he remains on amlodipine, carvedilol, and losartan, as well as levothyroxine which he started in Jan 2023.      In terms of supplements, he is on a multivitamin, fish oil, vitamin D, and biotin (biotin was started in January 2023)     Rhinitis:  Has noticed in past 6-7 years gets mild nasal congestion which he uses OTC antihistamine.  He denies history of asthma, eczema, food allergy, venom allergy.    Drug allergy: Was told that he is allergic to penicillin as a young child, not sure of what occurred      Review of Systems   Constitutional: Negative.    HENT: Negative.     Eyes: Negative.    Respiratory: Negative.     Cardiovascular: Negative.    Gastrointestinal: Negative.    Musculoskeletal: Negative.    Skin:  Positive for rash.   Allergic/Immunologic: Negative.    Hematological: Negative.        Vital signs:  /74   Pulse 61   Temp 36.4 °C (97.6 °F)   Ht 1.778 m (5' 10\")  "  Wt 97.7 kg (215 lb 6.4 oz)   SpO2 97%   BMI 30.91 kg/m²     Physical Exam:  GENERAL: Alert, oriented and in no acute distress.     HEENT: EYES: No conjunctival injection or cobblestoning. Nose: nasal turbinates are not edematous and are not boggy.  There is no mucous stranding, polyps, or blood    noted. EARS: Tympanic membranes are clear. MOUTH: moist and pink with no exudates, ulcers, or thrush. NECK: is supple, without adenopathy.  No upper airway stridor noted.       HEART: regular rate and rhythm.       LUNGS: Clear to auscultation bilaterally. No wheezing, rhonchi or rales.        ABDOMEN: Positive bowel sounds, soft, nontender, nondistended.       EXTREMITIES: No clubbing or edema.        NEURO:  Normal affect.  Gait normal.      SKIN: Erythematous raised papular lesions in clusters on chest, back, arms, legs.  See picture below          Skin biopsy 11/2023:   FINAL DIAGNOSIS    A. Skin, right medial superior chest, punch biopsy:   - Granulomatous dermatitis, see comment.     B.  Skin, left anterior shoulder, punch biopsy (DIF):  - Negative direct immunofluorescence, see comment.     WFB/CR 11/16/2023     Overall, the histologic features are those of a granulomatous dermatitis. The histologic differential diagnosis includes a granulomatous drug eruption, interstitial granuloma annulare, or a ruptured suppurative/granulomatous folliculitis. Clinical correlation is recommended.     Impression:  1. Rash and nonspecific skin eruption      Assessment and Plan:  Mr. Laboy presents with a granulomatous dermatitis which has been present since summer 2023.  Due to concern for drug-induced rash, he was discontinued on his statin and hydrochlorothiazide to see if there would be benefit.  He also recently finished a 6-week course of prednisone, and rash has not resolved with these interventions.  It is unclear if this rash is  drug-induced, however recommend that he stop his biotin supplement in the event that this  could be contributing to rash.  Will refer to  dermatology for further evaluation.    We would be happy to see him in follow-up as needed.

## 2024-04-05 ASSESSMENT — ENCOUNTER SYMPTOMS
EYES NEGATIVE: 1
ALLERGIC/IMMUNOLOGIC NEGATIVE: 1
MUSCULOSKELETAL NEGATIVE: 1
GASTROINTESTINAL NEGATIVE: 1
CONSTITUTIONAL NEGATIVE: 1
CARDIOVASCULAR NEGATIVE: 1
HEMATOLOGIC/LYMPHATIC NEGATIVE: 1
RESPIRATORY NEGATIVE: 1

## 2024-04-08 DIAGNOSIS — L30.8 INTERSTITIAL GRANULOMATOUS DERMATITIS: Primary | ICD-10-CM

## 2024-04-08 NOTE — PROGRESS NOTES
Spoke with Mr. Laboy to let him know that lab orders are placed prior to Dermatology visit.  Advised him to stop any topical therapies.  He mentioned that he had inadvertently re-started his old statin medication so he has stopped that as well.

## 2024-04-10 ENCOUNTER — LAB (OUTPATIENT)
Dept: LAB | Facility: LAB | Age: 74
End: 2024-04-10
Payer: MEDICARE

## 2024-04-10 ENCOUNTER — OFFICE VISIT (OUTPATIENT)
Dept: DERMATOLOGY | Facility: CLINIC | Age: 74
End: 2024-04-10
Payer: MEDICARE

## 2024-04-10 DIAGNOSIS — R21 RASH AND OTHER NONSPECIFIC SKIN ERUPTION: ICD-10-CM

## 2024-04-10 DIAGNOSIS — Z12.5 SCREENING FOR PROSTATE CANCER: ICD-10-CM

## 2024-04-10 DIAGNOSIS — L30.8 INTERSTITIAL GRANULOMATOUS DERMATITIS: ICD-10-CM

## 2024-04-10 DIAGNOSIS — D48.5 NEOPLASM OF UNCERTAIN BEHAVIOR OF SKIN: Primary | ICD-10-CM

## 2024-04-10 LAB
CRP SERPL-MCNC: 1.13 MG/DL
ERYTHROCYTE [SEDIMENTATION RATE] IN BLOOD BY WESTERGREN METHOD: 14 MM/H (ref 0–20)

## 2024-04-10 PROCEDURE — 87385 HISTOPLASMA CAPSUL AG IA: CPT

## 2024-04-10 PROCEDURE — 86481 TB AG RESPONSE T-CELL SUSP: CPT

## 2024-04-10 PROCEDURE — 11105 PUNCH BX SKIN EA SEP/ADDL: CPT | Performed by: DERMATOLOGY

## 2024-04-10 PROCEDURE — 1159F MED LIST DOCD IN RCRD: CPT | Performed by: DERMATOLOGY

## 2024-04-10 PROCEDURE — 86140 C-REACTIVE PROTEIN: CPT

## 2024-04-10 PROCEDURE — 36415 COLL VENOUS BLD VENIPUNCTURE: CPT

## 2024-04-10 PROCEDURE — 1160F RVW MEDS BY RX/DR IN RCRD: CPT | Performed by: DERMATOLOGY

## 2024-04-10 PROCEDURE — 85652 RBC SED RATE AUTOMATED: CPT

## 2024-04-10 PROCEDURE — 1157F ADVNC CARE PLAN IN RCRD: CPT | Performed by: DERMATOLOGY

## 2024-04-10 PROCEDURE — G0103 PSA SCREENING: HCPCS

## 2024-04-10 PROCEDURE — 86235 NUCLEAR ANTIGEN ANTIBODY: CPT

## 2024-04-10 PROCEDURE — 86038 ANTINUCLEAR ANTIBODIES: CPT

## 2024-04-10 PROCEDURE — 88305 TISSUE EXAM BY PATHOLOGIST: CPT | Performed by: DERMATOLOGY

## 2024-04-10 PROCEDURE — 86225 DNA ANTIBODY NATIVE: CPT

## 2024-04-10 PROCEDURE — 99214 OFFICE O/P EST MOD 30 MIN: CPT | Performed by: DERMATOLOGY

## 2024-04-10 PROCEDURE — 11104 PUNCH BX SKIN SINGLE LESION: CPT | Performed by: DERMATOLOGY

## 2024-04-10 PROCEDURE — 88312 SPECIAL STAINS GROUP 1: CPT | Performed by: DERMATOLOGY

## 2024-04-10 ASSESSMENT — DERMATOLOGY QUALITY OF LIFE (QOL) ASSESSMENT
RATE HOW BOTHERED YOU ARE BY EFFECTS OF YOUR SKIN PROBLEMS ON YOUR ACTIVITIES (EG, GOING OUT, ACCOMPLISHING WHAT YOU WANT, WORK ACTIVITIES OR YOUR RELATIONSHIPS WITH OTHERS): 1
RATE HOW BOTHERED YOU ARE BY SYMPTOMS OF YOUR SKIN PROBLEM (EG, ITCHING, STINGING BURNING, HURTING OR SKIN IRRITATION): 1
RATE HOW BOTHERED YOU ARE BY SYMPTOMS OF YOUR SKIN PROBLEM (EG, ITCHING, STINGING BURNING, HURTING OR SKIN IRRITATION): 1
RATE HOW EMOTIONALLY BOTHERED YOU ARE BY YOUR SKIN PROBLEM (FOR EXAMPLE, WORRY, EMBARRASSMENT, FRUSTRATION): 1

## 2024-04-10 NOTE — PATIENT INSTRUCTIONS
It was a pleasure seeing you today in clinic today. The rash is called chronic granulomatous dermatitis. This is a condition that can be triggered by medications most commonly, autoimmune disease, or cancer. When the condition is caused by a medication it can take 2-3 months for the rash to improve. It is hard to say if your rash worsened due to discontinuation of atorvastatin or due to decreasing dose of the treatment with prednisone. We would like you to talk to your cardiologist about starting a non-statin medication and discontinuation of rosuvastatin. If it is not improving while you are off statins, then we might consider having you discontinue another medication.   We also took two biopsies today to do some additional testing to confirm the diagnosis. We will contact you in 1-2 weeks with the biopsy results. We will determine when we would like you to follow up once your biopsy and labs results return.

## 2024-04-10 NOTE — PROGRESS NOTES
Subjective     Jalen Laboy is a 73 y.o. male who presents for the following: GRANULOMATOUS DERMATITIS.     New patient visit for chronic granulomatous dermatitis     The rash started last summer after he was out in the sun. It started on his arms and then spread to his trunk. He treated it with calamine and topical corticosteroids.    He was then evaluated at [facility], where biopsy confirmed the dx in 11/2023    Treatment: 6 week prednisone taper (rx'ed by [facility]), finished about 2 weeks ago  His rash has not improved  He discontinued atorvastatin in 12/2023 and switched to rosuvastatin and it had almost cleared, but he accidentally resumed atorvastatin about 3 weeks ago when he went on a trip to [place] and he got worse. He stopped taking atorvastatin 4/5.   Also discontinued hydrochlorothiazide in 12/2023  triaminco    Current meds: amlodipine, carvedilol, losartan, levothyroxine, multivitamin, vitamin d, biotin    Labs obtained today:  CRP elevated 1.13, ESR WNL  Histoplasmosis, tspot, NESSA pending    Skin biopsy 11/2023:   FINAL DIAGNOSIS     A. Skin, right medial superior chest, punch biopsy:   - Granulomatous dermatitis, see comment.     B.  Skin, left anterior shoulder, punch biopsy (DIF):  - Negative direct immunofluorescence, see comment.     Review of Systems:  No other skin or systemic complaints other than what is documented elsewhere in the note.    The following portions of the chart were reviewed this encounter and updated as appropriate:       Specialty Problems          Dermatology Problems    Benign neoplasm of skin     Formatting of this note might be different from the original. NEVI Formatting of this note might be different from the original. Overview: NEVI         Epidermal cyst    Other hypertrophic disorders of the skin    Other seborrheic keratosis    Lesion of skin of scalp     Past Medical History:  Jalen Laboy  has a past medical history of Heart disease,  Personal history of other benign neoplasm, and Unspecified convulsions (CMS/HCC).    Past Surgical History:  Jalen Laboy  has a past surgical history that includes CT angio coronary art with heartflow if score >30% (04/19/2022) and Cardiac surgery.    Family History:  Patient family history includes Hypertension in his mother.    Social History:  Jalen Laboy  reports that he has quit smoking. His smoking use included cigarettes. He has never used smokeless tobacco. He reports that he does not currently use alcohol. He reports that he does not use drugs.    Allergies:  Penicillins    Current Medications / CAM's:    Current Outpatient Medications:     acetaminophen (Tylenol) 325 mg tablet, Take by mouth every 6 hours., Disp: , Rfl:     aspirin 81 mg EC tablet, Take 1 tablet (81 mg) by mouth once daily., Disp: , Rfl:     carvedilol (Coreg) 25 mg tablet, Take 1 tablet (25 mg) by mouth 2 times a day., Disp: 180 tablet, Rfl: 3    fish oil concentrate (Omega-3) 120-180 mg capsule, Take 1 capsule (1 g) by mouth once daily., Disp: , Rfl:     levothyroxine (Synthroid, Levoxyl) 137 mcg tablet, Take 1 tablet (137 mcg) by mouth once daily., Disp: , Rfl:     losartan (Cozaar) 100 mg tablet, TAKE 1 TABLET DAILY IN THE EVENING (DOSE INCREASED, STOP ISOSORBIDE), Disp: 90 tablet, Rfl: 3    rosuvastatin (Crestor) 20 mg tablet, Take 1 tablet (20 mg) by mouth once daily., Disp: 90 tablet, Rfl: 3    amLODIPine (Norvasc) 10 mg tablet, Take 1 tablet (10 mg) by mouth once daily. (Patient not taking: Reported on 4/10/2024), Disp: 90 tablet, Rfl: 3     Objective   Well appearing patient in no apparent distress; mood and affect are within normal limits.    A focused examination was performed including face, neck, chest, axillae, abdomen, back, buttocks, bilateral upper extremities, bilateral lower extremities. All findings within normal limits unless otherwise noted below.    Pink papules and plaques coalescing on the chest, back,  upper extremities. There is central clearing of the rash on the central back    Right Upper Arm - Posterior  Pink papules and plaques coalescing     Right Upper Back  Pink papules and plaques coalescing        Assessment/Plan   Neoplasm of uncertain behavior of skin (2)  Right Upper Arm - Posterior    Lesion biopsy  Type of biopsy: punch    Informed consent: discussed and consent obtained    Timeout: patient name, date of birth, surgical site, and procedure verified    Procedure prep:  Patient was prepped and draped  Anesthesia: the lesion was anesthetized in a standard fashion    Anesthetic:  1% lidocaine w/ epinephrine 1-100,000 local infiltration  Punch size:  4 mm  Suture size:  5-0  Suture type: fast-absorbing plain gut    Suture removal (days):  14  Hemostasis achieved with: suture    Outcome: patient tolerated procedure well    Post-procedure details: sterile dressing applied and wound care instructions given    Dressing type: bandage and petrolatum      Specimen 1 - Dermatopathology- DERM LAB  Differential Diagnosis: granulomatous dermatitis vs granulomatous MF  Check Margins Yes/No?:    Comments:    Dermpath Lab: Routine Histopathology (formalin-fixed tissue)    Right Upper Back    Lesion biopsy  Type of biopsy: punch    Informed consent: discussed and consent obtained    Timeout: patient name, date of birth, surgical site, and procedure verified    Procedure prep:  Patient was prepped and draped  Anesthesia: the lesion was anesthetized in a standard fashion    Anesthetic:  1% lidocaine w/ epinephrine 1-100,000 local infiltration  Punch size:  4 mm  Suture size:  5-0  Suture type: fast-absorbing plain gut    Suture removal (days):  14  Hemostasis achieved with: suture    Outcome: patient tolerated procedure well    Post-procedure details: sterile dressing applied and wound care instructions given    Dressing type: bandage and petrolatum      Specimen 2 - Dermatopathology- DERM LAB  Differential Diagnosis:  granulomatous dermatitis vs granulomatous MF  Check Margins Yes/No?:    Comments:    Dermpath Lab: Routine Histopathology (formalin-fixed tissue)    Neoplasm of uncertain etiology x 2  - A) right upper arm-posterior, ddx: granulomatous dermatitis vs granulomatous mycosis fungoides  - B) Right upper back, ddx- granulomatous dermatitis vs granulomatous mycosis fungoides  - Reviewed risk for bleeding, infection, and scarring associated with biopsy  - Patient provided verbal consent for punch biopsy  - Punch biopsy: See procedure note for further details  - Suture removal not needed  - Will contact patient with biopsy results and plan in 1-2 weeks  - Wound care instructions reviewed and handout provided   - Follow up and treatment pending biopsy results    Rash and other nonspecific skin eruption    - Differential dx includes granulomatous dermatitis 2/2 medication vs granulomatous mycosis fungoides  - Reviewed that this condition can be caused by medications, autoimmune disease, or cancer. Advised that patient stay up to date on his cancer screenings. He has notable history of tubular adenoma of the colon diagnosed last year after colonoscopy and patient reports that he was advised to follow up in 5 years  - Patient has had CT and CXR within the last year that were within normal limits. Last PSA was in 2021  - Added on PSA to labs obtained this AM  - ESR slightly elevated, CRP WNL, histoplasmosis, tspot, and jovan are pending at this time  - s/p prednisone taper 6 week course starting at 40 mg, completed 2 weeks ago, rx'ed by [facility]  - Patient discontinued atorvastatin in 12/2023 and switched to rosuvastatin, he initially noted significant improvement, but accidentally resumed atorvastatin 3 weeks ago and has since stopped it 5 days ago.  - Reviewed that it is unclear if his rash improved due to prednisone or discontinuation of atorvastatin  - Recommended that he discuss discontinuation of rosuvastatin with his  cardiologist and switch to a different non-statin medication. Discussed that it can take 2-3 months off of the culprit medication before the rash improves. If it is not improved at follow up, consider trial off another medication. His other medications currently are amlodipine, carvedilol, losartan, levothyroxine, multivitamin, vitamin d, biotin.  - Patient also discontinued hydrochlorothiazide in 12/2023  - Holding triamcinolone BID as of yesterday in case new biopsy is needed  - biopsy at [facility] 11/2023 c/w granulomatous dermatitis, DIF was negative  - Punch biopsies obtained today. Follow up and treatment pending biopsy and lab results    Screening for prostate cancer    Related Procedures  Prostate Specific Antigen       Follow up pending biopsy and lab results    Nathalie Jang MD   [id number] Dermatology Resident    I saw and evaluated the patient. I personally obtained the key and critical portions of the history and physical exam or was physically present for key and critical portions performed by the resident/fellow. I reviewed the resident/fellow's documentation and discussed the patient with the resident/fellow. I agree with the resident/fellow's medical decision making as documented in the note and made changes where appropriate.

## 2024-04-11 ENCOUNTER — APPOINTMENT (OUTPATIENT)
Dept: DERMATOLOGY | Facility: CLINIC | Age: 74
End: 2024-04-11
Payer: MEDICARE

## 2024-04-11 LAB
ANA PATTERN: ABNORMAL
ANA SER QL HEP2 SUBST: POSITIVE
ANA TITR SER IF: ABNORMAL {TITER}
CENTROMERE B AB SER-ACNC: <0.2 AI
CHROMATIN AB SERPL-ACNC: 0.3 AI
DSDNA AB SER-ACNC: <1 IU/ML
ENA JO1 AB SER QL IA: <0.2 AI
ENA RNP AB SER IA-ACNC: 2.3 AI
ENA SCL70 AB SER QL IA: <0.2 AI
ENA SM AB SER IA-ACNC: <0.2 AI
ENA SM+RNP AB SER QL IA: <0.2 AI
ENA SS-A AB SER IA-ACNC: <0.2 AI
ENA SS-B AB SER IA-ACNC: <0.2 AI
PSA SERPL-MCNC: 1.13 NG/ML
RIBOSOMAL P AB SER-ACNC: <0.2 AI

## 2024-04-12 LAB
LABORATORY COMMENT REPORT: NORMAL
NIL(NEG) CONTROL SPOT COUNT: NORMAL
PANEL A SPOT COUNT: 0
PANEL B SPOT COUNT: 1
PATH REPORT.FINAL DX SPEC: NORMAL
PATH REPORT.GROSS SPEC: NORMAL
PATH REPORT.RELEVANT HX SPEC: NORMAL
PATH REPORT.TOTAL CANCER: NORMAL
POS CONTROL SPOT COUNT: NORMAL
T-SPOT. TB INTERPRETATION: NEGATIVE

## 2024-04-15 ENCOUNTER — TELEPHONE (OUTPATIENT)
Dept: ALLERGY | Facility: CLINIC | Age: 74
End: 2024-04-15
Payer: MEDICARE

## 2024-04-15 DIAGNOSIS — R21 RASH AND NONSPECIFIC SKIN ERUPTION: ICD-10-CM

## 2024-04-15 DIAGNOSIS — R76.8 POSITIVE ANA (ANTINUCLEAR ANTIBODY): ICD-10-CM

## 2024-04-15 DIAGNOSIS — R76.8 POSITIVE SM/RNP ANTIBODY: Primary | ICD-10-CM

## 2024-04-15 LAB — SCAN RESULT: NORMAL

## 2024-04-16 ENCOUNTER — TELEPHONE (OUTPATIENT)
Dept: SCHEDULING | Age: 74
End: 2024-04-16
Payer: MEDICARE

## 2024-04-16 NOTE — TELEPHONE ENCOUNTER
Result Communication    Resulted Orders   NESSA with Reflex to JACKY   Result Value Ref Range    NESSA Positive (A) Negative      Comment:      The Antinuclear Antibody (NESSA) test was performed using  indirect immunofluorescence assay with HEp-2 cells slide.    NESSA Pattern Homogeneous     NESSA Titer 1:2560    Sedimentation rate, automated   Result Value Ref Range    Sedimentation Rate 14 0 - 20 mm/h   T-Spot TB   Result Value Ref Range    T-SPOT. TB Interpretation Negative Negative      Comment:         A negative test result does not exclude the possibility  of exposure to or infection with Mycobacterium  tuberculosis (M. tuberculosis). Patients with recent  exposure to TB infected individuals exhibiting a  negative T-SPOT.TB result should be considered for  retesting within 6 weeks or if other relevant clinical  symptoms indicate. Results from T-SPOT.TB testing must  be used in conjunction with each individual's  epidemiological history, current medical status,  and results of other diagnostic evaluations.            The T-SPOT.TB test is qualitative and results are  reported as positive, borderline, or negative, given  that the test controls perform as expected. In line  with the Centers for Disease Control and Prevention's  2010 recommendation to report quantitative measurements  alongside the qualitative result, the laboratory  provides spot counts for informational purposes only.  The T-SPOT.TB test should not be interpreted as a  quantitative test.       Panel A Spot Count 0     Panel B Spot Count 1     NIL(NEG) Control Spot Count Passed     POS Control Spot Count Passed       Comment:         For additional information, please refer to  http://education.[employer]/faq/VTI803     (This link is being provided for informational/  educational purposes only.)          Histoplasma Antigen, Serum or Plasma   Result Value Ref Range    Scan Result See Scanned Result    C-Reactive Protein   Result Value Ref Range     C-Reactive Protein 1.13 (H) <1.00 mg/dL   JACKY Panel   Result Value Ref Range    Anti-SM <0.2 <1.0 AI      Comment:      < 1.0 = NEGATIVE  >=1.0 = POSITIVE    Anti-RNP 2.3 (H) <1.0 AI      Comment:      < 1.0 = NEGATIVE  >=1.0 = POSITIVE    Anti-SM/RNP <0.2 <1.0 AI      Comment:      < 1.0 = NEGATIVE  >=1.0 = POSITIVE    Anti-SSA <0.2 <1.0 AI      Comment:      < 1.0 = NEGATIVE  >=1.0 = POSITIVE    Anti-SSB <0.2 <1.0 AI      Comment:      < 1.0 = NEGATIVE  >=1.0 = POSITIVE    Anti-SCL-70 <0.2 <1.0 AI      Comment:      < 1.0 = NEGATIVE  >=1.0 = POSITIVE    Anti-TASH-1 IgG <0.2 <1.0 AI      Comment:      < 1.0 = NEGATIVE  >=1.0 = POSITIVE    Anti-Chromatin 0.3 <1.0 AI      Comment:      < 1.0 = NEGATIVE  >=1.0 = POSITIVE    Anti-Centromere <0.2 <1.0 AI      Comment:      < 1.0 = NEGATIVE  >=1.0 = POSITIVE    ANTI-RIBOSOMAL P <0.2 <1.0 AI      Comment:      < 1.0 = NEGATIVE  >=1.0 = POSITIVE    Anti-DNA (DS) <1.0 <5.0 IU/mL      Comment:      NEGATIVE: <= 4 IU/ML  EQUIVOCAL: 5- 9 IU/ML  POSITIVE: >=10 IU/ML       11:48 AM      Results were successfully communicated with the patient and they acknowledged their understanding.

## 2024-04-16 NOTE — TELEPHONE ENCOUNTER
Please let MrDarien Laboy know that autoimmune labs were positive, and have referred to Rheumatology to evaluate for systemic autoimmunity is contributing to rash.

## 2024-04-16 NOTE — TELEPHONE ENCOUNTER
Patient's dermatologist, Dr. Park, advised patient to discontinue rosuvastatin because of an ongoing rash.  Patient may have an autoimmune disorder.  Rash may or may not be related to the rosuvastatin.  Hydrochlorothiazide has already been withdrawn.    He does have multivessel coronary artery disease.  We would prefer a statin regimen or a PCSK9 inhibitor.  Nexletol is an option, probably somewhat less effective.    Recommend that he stop the rosuvastatin for 1 to 2 weeks, and that he follow-up with Dr. Estrella with a [employer] message or a phone call to determine next steps.  If rash not improved, would go ahead and just resume the rosuvastatin, if improved, consider alternate lipid-lowering drug therapy with Repatha or perhaps with Nexletol.

## 2024-04-19 ENCOUNTER — TELEPHONE (OUTPATIENT)
Dept: DERMATOLOGY | Facility: CLINIC | Age: 74
End: 2024-04-19
Payer: MEDICARE

## 2024-04-19 NOTE — TELEPHONE ENCOUNTER
Called patient to review results and treatment plan on 4/19/24:    -Start hydroxychloroquine (plaquenil) start 200 mg daily for two weeks then increase use to 200 mg BID. Reviewed side effects of retinopathy with chronic long term use, blue/gray discoloration of the shins, nail hyperpigmentation, nausea, vomiting, and hemolysis. Call our office and see optometry if experiencing vision changes or fatigue.  -Discussed that it can take at least 3-4 months to notice improvement in symptoms  - Recommended baseline eye exam and then dilated exam and visual acuity test within the first year of starting treatment and then yearly after 5 years of treatment. There is a 1% increased risk of retinopathy at 5 years. It is okay to start taking plaqneuil before the eye exam.  - 12/2023 labs: CBC w/diff, CMP, without contraindications to starting treatment   - Patient had EKG on 12/23/24 with normal QTC  - Obtain maintenance labs: CBC, CMP monthly   - Follow up as scheduled with Dr. Park in 2 months

## 2024-04-23 ENCOUNTER — TELEPHONE (OUTPATIENT)
Dept: DERMATOLOGY | Facility: CLINIC | Age: 74
End: 2024-04-23

## 2024-04-23 DIAGNOSIS — L30.8 INTERSTITIAL GRANULOMATOUS DERMATITIS: ICD-10-CM

## 2024-04-23 RX ORDER — HYDROXYCHLOROQUINE SULFATE 200 MG/1
200 TABLET, FILM COATED ORAL 2 TIMES DAILY
Qty: 180 TABLET | Refills: 0 | Status: SHIPPED | OUTPATIENT
Start: 2024-04-23 | End: 2024-07-22

## 2024-04-23 NOTE — TELEPHONE ENCOUNTER
Padilla Park MD  [id number] Clinical Support Staff  Results are consistent with a granulomatous dermatitis driven by underlying autoimmune process, given the high NESSA and anti RNP, and elevated CRP.  Start Plaquenil 200 mg po bid.  Obtain ophthalmology or optometry visit to examine retina (can start plaquenil before the appointment). Please discuss with patient and answer questions.  Ask Dr Jang to go over risks/benefits.  Follow CBC w Diff and CMP q month.    *Dr. Jang called pt to discuss risks and benefits and answred any questions .  Adding orders for standing labs and Rx

## 2024-04-24 ENCOUNTER — TELEPHONE (OUTPATIENT)
Dept: SCHEDULING | Age: 74
End: 2024-04-24
Payer: MEDICARE

## 2024-04-29 ENCOUNTER — OFFICE VISIT (OUTPATIENT)
Dept: PRIMARY CARE | Facility: CLINIC | Age: 74
End: 2024-04-29
Payer: MEDICARE

## 2024-04-29 VITALS
SYSTOLIC BLOOD PRESSURE: 100 MMHG | DIASTOLIC BLOOD PRESSURE: 50 MMHG | BODY MASS INDEX: 30.34 KG/M2 | RESPIRATION RATE: 12 BRPM | TEMPERATURE: 98.1 F | OXYGEN SATURATION: 97 % | WEIGHT: 211.9 LBS | HEART RATE: 79 BPM | HEIGHT: 70 IN

## 2024-04-29 DIAGNOSIS — I25.10 CORONARY ARTERY DISEASE INVOLVING NATIVE HEART WITHOUT ANGINA PECTORIS, UNSPECIFIED VESSEL OR LESION TYPE: ICD-10-CM

## 2024-04-29 DIAGNOSIS — I10 BENIGN ESSENTIAL HTN: ICD-10-CM

## 2024-04-29 DIAGNOSIS — L30.8 INTERSTITIAL GRANULOMATOUS DERMATITIS: Primary | ICD-10-CM

## 2024-04-29 PROBLEM — U07.1 COVID-19 VIRUS INFECTION: Status: RESOLVED | Noted: 2023-10-04 | Resolved: 2024-04-29

## 2024-04-29 PROBLEM — Z98.890 S/P COLONOSCOPY WITH POLYPECTOMY: Status: RESOLVED | Noted: 2023-10-04 | Resolved: 2024-04-29

## 2024-04-29 PROCEDURE — 1157F ADVNC CARE PLAN IN RCRD: CPT | Performed by: STUDENT IN AN ORGANIZED HEALTH CARE EDUCATION/TRAINING PROGRAM

## 2024-04-29 PROCEDURE — 1036F TOBACCO NON-USER: CPT | Performed by: STUDENT IN AN ORGANIZED HEALTH CARE EDUCATION/TRAINING PROGRAM

## 2024-04-29 PROCEDURE — 3078F DIAST BP <80 MM HG: CPT | Performed by: STUDENT IN AN ORGANIZED HEALTH CARE EDUCATION/TRAINING PROGRAM

## 2024-04-29 PROCEDURE — 1159F MED LIST DOCD IN RCRD: CPT | Performed by: STUDENT IN AN ORGANIZED HEALTH CARE EDUCATION/TRAINING PROGRAM

## 2024-04-29 PROCEDURE — 3074F SYST BP LT 130 MM HG: CPT | Performed by: STUDENT IN AN ORGANIZED HEALTH CARE EDUCATION/TRAINING PROGRAM

## 2024-04-29 PROCEDURE — 1160F RVW MEDS BY RX/DR IN RCRD: CPT | Performed by: STUDENT IN AN ORGANIZED HEALTH CARE EDUCATION/TRAINING PROGRAM

## 2024-04-29 PROCEDURE — 99213 OFFICE O/P EST LOW 20 MIN: CPT | Performed by: STUDENT IN AN ORGANIZED HEALTH CARE EDUCATION/TRAINING PROGRAM

## 2024-04-29 ASSESSMENT — PATIENT HEALTH QUESTIONNAIRE - PHQ9
2. FEELING DOWN, DEPRESSED OR HOPELESS: NOT AT ALL
SUM OF ALL RESPONSES TO PHQ9 QUESTIONS 1 AND 2: 0
1. LITTLE INTEREST OR PLEASURE IN DOING THINGS: NOT AT ALL

## 2024-04-29 NOTE — PROGRESS NOTES
Subjective   Patient ID: Jalen Laboy is a pleasant 73 y.o. male with significant history of hypertension, CAD status post CABG x 4, hypothyroidism, interstitial granulomatous dermatitis who presents for Follow-up (Follow up - skin rash).  HPI  Patient is here to follow-up.  He has been diagnosed with interstitial granulomatous dermatitis that presents as a rash on bilateral upper extremities and, anterior chest and posterior thoracic region.  He follows up with dermatology and has been started on hydroxychloroquine.  He has not yet started taking the hydroxychloroquine as he just received the medication.    He is currently not taking statin to see if he notes any changes in the rash.  Reports that he has been off of statin for the past 3 weeks and he has not noted any significant change in the rash.  It was discussed with him by cardiology that if the rash improves while holding off on rosuvastatin he might need to be on a nonstatin treatment such as Repatha  We discussed about resuming the rosuvastatin since holding the rosuvastatin has not made any significant change in the rash.  He has an upcoming appointment with cardiology, rheumatology and dermatology.  He has completed his annual eye exam in December 2023.    Review of Systems   Skin:  Positive for rash.   All other systems reviewed and are negative.      Visit Vitals  /50   Pulse 79   Temp 36.7 °C (98.1 °F)   Resp 12          Objective   Physical Exam  Constitutional:       General: He is not in acute distress.     Appearance: Normal appearance. He is well-developed and well-groomed.   HENT:      Head: Normocephalic and atraumatic.   Eyes:      General: Lids are normal. No scleral icterus.     Conjunctiva/sclera: Conjunctivae normal.   Pulmonary:      Effort: Pulmonary effort is normal. No accessory muscle usage.   Skin:     General: Skin is warm and dry.      Findings: Rash present.   Neurological:      Mental Status: He is alert and oriented to  person, place, and time. Mental status is at baseline.   Psychiatric:         Attention and Perception: Attention normal.         Mood and Affect: Mood and affect normal.         Speech: Speech normal.         Behavior: Behavior normal.         Thought Content: Thought content normal.         Cognition and Memory: Cognition and memory normal.         Judgment: Judgment normal.         Assessment/Plan   Problem List Items Addressed This Visit       Benign essential HTN     Stable, continue with current medications.         CAD (coronary artery disease)     Will resume rosuvastatin as he has not noted any significant improvement in his rash since he has been taking rosuvastatin for 3 weeks.         Interstitial granulomatous dermatitis - Primary     Follows up with dermatology.  Has been started on hydroxychloroquine.  Has an upcoming appointment with rheumatology.

## 2024-04-29 NOTE — ASSESSMENT & PLAN NOTE
Will resume rosuvastatin as he has not noted any significant improvement in his rash since he has been taking rosuvastatin for 3 weeks.

## 2024-04-29 NOTE — ASSESSMENT & PLAN NOTE
Follows up with dermatology.  Has been started on hydroxychloroquine.  Has an upcoming appointment with rheumatology.

## 2024-05-01 NOTE — TELEPHONE ENCOUNTER
Left a message for patient since I have tried a couple of times to reach him regarding his rash and possibly starting plaquenil etc. Patient has been off his rosuvastatin for 3 weeks and has no improvement in his rash. He saw his primary MD yesterday who suggested resuming his rosuvastatin since no improvement in rash after holding for 3 weeks. I agree with this plan. I believe patient has not yet started the plaquenil but just received it in the mail. Will discuss further when the patient comes in for upcoming appointment

## 2024-05-06 ENCOUNTER — OFFICE VISIT (OUTPATIENT)
Dept: ENDOCRINOLOGY | Facility: CLINIC | Age: 74
End: 2024-05-06
Payer: MEDICARE

## 2024-05-06 VITALS
BODY MASS INDEX: 30.49 KG/M2 | HEIGHT: 70 IN | HEART RATE: 66 BPM | SYSTOLIC BLOOD PRESSURE: 148 MMHG | DIASTOLIC BLOOD PRESSURE: 73 MMHG | WEIGHT: 213 LBS

## 2024-05-06 DIAGNOSIS — E06.3 HYPOTHYROIDISM DUE TO HASHIMOTO'S THYROIDITIS: Primary | ICD-10-CM

## 2024-05-06 DIAGNOSIS — E03.8 HYPOTHYROIDISM DUE TO HASHIMOTO'S THYROIDITIS: Primary | ICD-10-CM

## 2024-05-06 DIAGNOSIS — L30.8 INTERSTITIAL GRANULOMATOUS DERMATITIS: ICD-10-CM

## 2024-05-06 PROCEDURE — 1036F TOBACCO NON-USER: CPT | Performed by: NURSE PRACTITIONER

## 2024-05-06 PROCEDURE — 3077F SYST BP >= 140 MM HG: CPT | Performed by: NURSE PRACTITIONER

## 2024-05-06 PROCEDURE — 1157F ADVNC CARE PLAN IN RCRD: CPT | Performed by: NURSE PRACTITIONER

## 2024-05-06 PROCEDURE — 1160F RVW MEDS BY RX/DR IN RCRD: CPT | Performed by: NURSE PRACTITIONER

## 2024-05-06 PROCEDURE — 3078F DIAST BP <80 MM HG: CPT | Performed by: NURSE PRACTITIONER

## 2024-05-06 PROCEDURE — 99214 OFFICE O/P EST MOD 30 MIN: CPT | Performed by: NURSE PRACTITIONER

## 2024-05-06 PROCEDURE — 1159F MED LIST DOCD IN RCRD: CPT | Performed by: NURSE PRACTITIONER

## 2024-05-06 NOTE — PROGRESS NOTES
"Subjective   Jalen Laboy is a 73 y.o. male presents today for a follow up visit  regarding hypothyroidism.  The patient was initially diagnosed around 1/2023. Denies family history of thyroid disease or autoimmune disease.     Patient of Dr. Estrella and referred to pedro luis for elevated TSH   Follows with Dr. Estrella for CAD   on 1/19/2023  Reports brittle hair and nails with 10 lb weight gain at diagnosis   Had OHS in May 2022 and had some fatigue after surgery - completed cardiac rehab.  Also had COVID late last year    Last visit with me 8/2023  (+) TPO 4/2023  The patient is taking levothyroxine 137 mcg once daily   Last TSH in range.   Hair and nails are better  Overall feeling better     He does report developing a rash   Has biopsy and was diagnosed with granulomatrous dermatitis  (+) NESSA  He was on prednisone 3 months  Tried coming off statin and switching statin  No change in rash   He is now on plaquenil    Has appt with rheumatology at the end of the month       ROS  Thyroid pain: no   Mass effect: denies difficulty swallowing, change in voice, difficulty breathing, pressure at neck   Energy: decreased   Sleep: restless- rash wakes him up   Cardiovascular:  denies heart palpitations or chest pain  GI: denies loose stools, frequent stools or constipation  Weight: stable   Eyes: denies dryness, occasional blurred vision   Memory: good  Diaphoresis: denies diaphoresis  Skin: (+) rash to trunk and legs.  no changes to hair, nails   Neuro: negative headaches, seizures, tremors    Objective    Physical Exam  Blood pressure 148/73, pulse 66, height 1.778 m (5' 10\"), weight 96.6 kg (213 lb).  General: not in acute distress, cooperative  Skin: rash noted to trunk, arms, and legs   Thyroid: thyroid is normal in size without nodules or tenderness  Respiratory: normal respiratory effort  Musculoskeletal: normal gait        Current Outpatient Medications:     acetaminophen (Tylenol) 325 mg tablet, Take by mouth " every 6 hours., Disp: , Rfl:     amLODIPine (Norvasc) 10 mg tablet, Take 1 tablet (10 mg) by mouth once daily., Disp: 90 tablet, Rfl: 3    aspirin 81 mg EC tablet, Take 1 tablet (81 mg) by mouth once daily., Disp: , Rfl:     carvedilol (Coreg) 25 mg tablet, Take 1 tablet (25 mg) by mouth 2 times a day., Disp: 180 tablet, Rfl: 3    fish oil concentrate (Omega-3) 120-180 mg capsule, Take 1 capsule (1 g) by mouth once daily., Disp: , Rfl:     hydroxychloroquine (PlaqueniL) 200 mg tablet, Take 1 tablet (200 mg) by mouth 2 times a day for 180 doses., Disp: 180 tablet, Rfl: 0    levothyroxine (Synthroid, Levoxyl) 137 mcg tablet, Take 1 tablet (137 mcg) by mouth once daily., Disp: , Rfl:     losartan (Cozaar) 100 mg tablet, TAKE 1 TABLET DAILY IN THE EVENING (DOSE INCREASED, STOP ISOSORBIDE), Disp: 90 tablet, Rfl: 3    rosuvastatin (Crestor) 20 mg tablet, Take 1 tablet (20 mg) by mouth once daily., Disp: 90 tablet, Rfl: 3          Assessment/Plan   Hypothyroidism due to Hashimoto's:   Granulomastosis dermatitis:  -TSH at goal.  He is currently doing well on this dose of t4 supplementation.  He did develop a rash which he was told was autoimmune and has a follow up with Rheumatology in a couple weeks.  He is taking his meds appropriately.  Takes MVI in evening.  Still has rash and has a follow up with rheumatology in a couple weeks.      Plan:   Continue levothyroxine 137 mcg once daily   <HOW TO TAKE LEVOTHYROXINE>  Take on an empty stomach, first thing in the morning, with water only.  Wait 30 minutes before drinking coffee, eating, or taking other medications.  Wait 3-4 hours before taking calcium, iron, or multivitamin pills.  Get thyroid labs with your next set drawn  Follow up 9 months- 12 months

## 2024-05-06 NOTE — PATIENT INSTRUCTIONS
Continue levothyroxine 137 mcg once daily     <HOW TO TAKE LEVOTHYROXINE>  Take on an empty stomach, first thing in the morning, with water only.  Wait 30 minutes before drinking coffee, eating, or taking other medications.  Wait 3-4 hours before taking calcium, iron, or multivitamin pills.    Get thyroid labs with your next set drawn    Follow up 9 months- 12 months

## 2024-05-21 ENCOUNTER — LAB (OUTPATIENT)
Dept: LAB | Facility: LAB | Age: 74
End: 2024-05-21
Payer: MEDICARE

## 2024-05-21 DIAGNOSIS — E03.8 HYPOTHYROIDISM DUE TO HASHIMOTO'S THYROIDITIS: ICD-10-CM

## 2024-05-21 DIAGNOSIS — E06.3 HYPOTHYROIDISM DUE TO HASHIMOTO'S THYROIDITIS: ICD-10-CM

## 2024-05-21 LAB
T4 FREE SERPL-MCNC: 1.67 NG/DL (ref 0.78–1.48)
TSH SERPL-ACNC: 0.4 MIU/L (ref 0.44–3.98)

## 2024-05-21 PROCEDURE — 84439 ASSAY OF FREE THYROXINE: CPT

## 2024-05-21 PROCEDURE — 84443 ASSAY THYROID STIM HORMONE: CPT

## 2024-05-21 PROCEDURE — 36415 COLL VENOUS BLD VENIPUNCTURE: CPT

## 2024-05-23 ENCOUNTER — OFFICE VISIT (OUTPATIENT)
Dept: RHEUMATOLOGY | Facility: CLINIC | Age: 74
End: 2024-05-23
Payer: MEDICARE

## 2024-05-23 VITALS
HEIGHT: 70 IN | DIASTOLIC BLOOD PRESSURE: 61 MMHG | TEMPERATURE: 98 F | SYSTOLIC BLOOD PRESSURE: 122 MMHG | BODY MASS INDEX: 30.46 KG/M2 | HEART RATE: 75 BPM | WEIGHT: 212.8 LBS

## 2024-05-23 DIAGNOSIS — R76.8 POSITIVE SM/RNP ANTIBODY: ICD-10-CM

## 2024-05-23 DIAGNOSIS — L93.2 CUTANEOUS LUPUS ERYTHEMATOSUS: Primary | ICD-10-CM

## 2024-05-23 DIAGNOSIS — R21 RASH AND NONSPECIFIC SKIN ERUPTION: ICD-10-CM

## 2024-05-23 DIAGNOSIS — R76.8 POSITIVE ANA (ANTINUCLEAR ANTIBODY): ICD-10-CM

## 2024-05-23 PROCEDURE — 1157F ADVNC CARE PLAN IN RCRD: CPT | Performed by: INTERNAL MEDICINE

## 2024-05-23 PROCEDURE — 1159F MED LIST DOCD IN RCRD: CPT | Performed by: INTERNAL MEDICINE

## 2024-05-23 PROCEDURE — 99205 OFFICE O/P NEW HI 60 MIN: CPT | Performed by: INTERNAL MEDICINE

## 2024-05-23 PROCEDURE — 3078F DIAST BP <80 MM HG: CPT | Performed by: INTERNAL MEDICINE

## 2024-05-23 PROCEDURE — 1160F RVW MEDS BY RX/DR IN RCRD: CPT | Performed by: INTERNAL MEDICINE

## 2024-05-23 PROCEDURE — 3074F SYST BP LT 130 MM HG: CPT | Performed by: INTERNAL MEDICINE

## 2024-05-23 RX ORDER — AZITHROMYCIN 250 MG/1
250 TABLET, FILM COATED ORAL DAILY
COMMUNITY
Start: 2022-08-23

## 2024-05-23 RX ORDER — PREDNISONE 5 MG/1
TABLET ORAL DAILY
Qty: 119 TABLET | Refills: 0 | Status: SHIPPED | OUTPATIENT
Start: 2024-05-23 | End: 2024-07-04

## 2024-05-23 NOTE — PROGRESS NOTES
Subjective   Patient ID: Jalen Laboy is a 73 y.o. male who presents for Osteoarthritis (New patient visit for osteoarthritis, pseudogout.).    HPI  74 yo male with HTN, HLD, IHD  He denies any joint pain or AM stiffness, Raynaud  He denies any malar rash, but he has photosensitive  He had a skin rashes in his body, legs  His rashes started last summer, he used some topical treatment  Derm saw him and started PRD and his rashes improved, but when he tapered off PRD, his rashes came back  Then, his NESSA came positive.  3-4 weeks ago, they started  mg daily.  Interstitial granulomatous dermatitis  He had an open heart surgery 2 years ago  He was using statin and still using it now.  He is also using levithyroxin, he had hypothryoidisim in 01/23  No family h/o SLE, CTD or inflammatory arthritis  Using HCQ  NESSA 1/2560  ESR 14  CRP 1.23 (3.7)  RNP 2.3  06/23:  WBC 3.7, PLt 101    ROS  Joint pain in hands: negative   Joint swelling: negative  Morning stiffness and duration: negative   strength: normal  Oral ulcer: negative  Genital ulcer: negative  Raynaud phenomenon: negative  Chest pain/dyspnea: negative  Low back pain: negative  Visual problem: negative  Dry eyes/dry mouth: negative  Skin rash/scaling/psoriasis: negative       Objective     PEXAM  VS reviewed, WNL  General: Alert, no distress   HEENT: Normocephalic/atraumatic, No alopecia. PERRLA. Sclera white, conjunctiva pink, no malar rash. no oral or nasal ulcer. Oral cavity pink and moist, no erythema or exudate, dentition good.   Neck: supple  Respiratory: CTA B, no adventitious breath sounds  Cardiac: RRR, no murmurs, carotid, or bruits  Abdominal: symmetrical, soft, non-tender, non-distended, normoactive BSx4 quadrants, no CVA tenderness or suprapubic tenderness  MSK: Joints of upper and lower extremities were assessed for synovitis and ROM.    Today she has no evidence of synovitis in the joints of her hands or wrists, tender joint count 0,  swollen joint count 0   Extremities: no clubbing, no cyanosis, no edema  Skin: Skin warm and moist.   Neuro: non-focal, Strength 5/5 throughout. Normal gait. No cerebellar pathologic exam     Assessment/Plan   74 yo male with HTN, HLD, CHD and hypothyroidisim  He had a rash in last summer, his rash was photosensitive  Before rash he was diagnosed with hypothyroidism  He used PRD and then his rash improved, but it recurred after stopping PRD.  Skin bx showed interstitial granulomatous dermatitis  His NESSA came high titer positive and his CBC showed leukopenia and low Plt at that time of rash.  HCQ was started because of possible cutaneous, systemic lupus.  Today, he reports generalized rash, itching, no joint pain  His PExam showed generalized rash in his body, legs and arms, no arthritis  Leg edema ++  I think his Bx findings are not characteristic for lupus, but high titer NESSA, cytopenia, photosensitivity are consistent with lupus  -will see his C3, C4, urine, repeat CBC  -will continue HCQ, but will use 5-6 weeks moderate dose steroid while waiting for the effect of HCQ.  -annual eye exam for HCQ  -will see him in 2-3 months

## 2024-05-30 ENCOUNTER — OFFICE VISIT (OUTPATIENT)
Dept: PRIMARY CARE | Facility: CLINIC | Age: 74
End: 2024-05-30
Payer: MEDICARE

## 2024-05-30 VITALS
WEIGHT: 211.7 LBS | BODY MASS INDEX: 30.31 KG/M2 | OXYGEN SATURATION: 96 % | TEMPERATURE: 97.1 F | HEART RATE: 63 BPM | DIASTOLIC BLOOD PRESSURE: 60 MMHG | SYSTOLIC BLOOD PRESSURE: 112 MMHG | RESPIRATION RATE: 14 BRPM | HEIGHT: 70 IN

## 2024-05-30 DIAGNOSIS — Z00.00 MEDICARE ANNUAL WELLNESS VISIT, SUBSEQUENT: Primary | ICD-10-CM

## 2024-05-30 DIAGNOSIS — Z13.89 ENCOUNTER FOR SCREENING FOR OTHER DISORDER: ICD-10-CM

## 2024-05-30 DIAGNOSIS — Z12.5 PROSTATE CANCER SCREENING: ICD-10-CM

## 2024-05-30 PROCEDURE — G0439 PPPS, SUBSEQ VISIT: HCPCS | Performed by: STUDENT IN AN ORGANIZED HEALTH CARE EDUCATION/TRAINING PROGRAM

## 2024-05-30 PROCEDURE — 3074F SYST BP LT 130 MM HG: CPT | Performed by: STUDENT IN AN ORGANIZED HEALTH CARE EDUCATION/TRAINING PROGRAM

## 2024-05-30 PROCEDURE — 3078F DIAST BP <80 MM HG: CPT | Performed by: STUDENT IN AN ORGANIZED HEALTH CARE EDUCATION/TRAINING PROGRAM

## 2024-05-30 PROCEDURE — 1159F MED LIST DOCD IN RCRD: CPT | Performed by: STUDENT IN AN ORGANIZED HEALTH CARE EDUCATION/TRAINING PROGRAM

## 2024-05-30 PROCEDURE — 1160F RVW MEDS BY RX/DR IN RCRD: CPT | Performed by: STUDENT IN AN ORGANIZED HEALTH CARE EDUCATION/TRAINING PROGRAM

## 2024-05-30 PROCEDURE — 1036F TOBACCO NON-USER: CPT | Performed by: STUDENT IN AN ORGANIZED HEALTH CARE EDUCATION/TRAINING PROGRAM

## 2024-05-30 PROCEDURE — 1157F ADVNC CARE PLAN IN RCRD: CPT | Performed by: STUDENT IN AN ORGANIZED HEALTH CARE EDUCATION/TRAINING PROGRAM

## 2024-05-30 PROCEDURE — 1158F ADVNC CARE PLAN TLK DOCD: CPT | Performed by: STUDENT IN AN ORGANIZED HEALTH CARE EDUCATION/TRAINING PROGRAM

## 2024-05-30 PROCEDURE — G0442 ANNUAL ALCOHOL SCREEN 15 MIN: HCPCS | Performed by: STUDENT IN AN ORGANIZED HEALTH CARE EDUCATION/TRAINING PROGRAM

## 2024-05-30 PROCEDURE — 99397 PER PM REEVAL EST PAT 65+ YR: CPT | Performed by: STUDENT IN AN ORGANIZED HEALTH CARE EDUCATION/TRAINING PROGRAM

## 2024-05-30 PROCEDURE — 1170F FXNL STATUS ASSESSED: CPT | Performed by: STUDENT IN AN ORGANIZED HEALTH CARE EDUCATION/TRAINING PROGRAM

## 2024-05-30 PROCEDURE — 1123F ACP DISCUSS/DSCN MKR DOCD: CPT | Performed by: STUDENT IN AN ORGANIZED HEALTH CARE EDUCATION/TRAINING PROGRAM

## 2024-05-30 ASSESSMENT — LIFESTYLE VARIABLES
HOW MANY STANDARD DRINKS CONTAINING ALCOHOL DO YOU HAVE ON A TYPICAL DAY: 1 OR 2
AUDIT-C TOTAL SCORE: 3
HOW OFTEN DO YOU HAVE A DRINK CONTAINING ALCOHOL: 2-3 TIMES A WEEK
HOW OFTEN DO YOU HAVE SIX OR MORE DRINKS ON ONE OCCASION: NEVER
SKIP TO QUESTIONS 9-10: 1

## 2024-05-30 ASSESSMENT — PATIENT HEALTH QUESTIONNAIRE - PHQ9
2. FEELING DOWN, DEPRESSED OR HOPELESS: NOT AT ALL
1. LITTLE INTEREST OR PLEASURE IN DOING THINGS: NOT AT ALL
SUM OF ALL RESPONSES TO PHQ9 QUESTIONS 1 AND 2: 0

## 2024-05-30 ASSESSMENT — ACTIVITIES OF DAILY LIVING (ADL)
GROCERY_SHOPPING: INDEPENDENT
TAKING_MEDICATION: INDEPENDENT
DOING_HOUSEWORK: INDEPENDENT
MANAGING_FINANCES: INDEPENDENT
DRESSING: INDEPENDENT
BATHING: INDEPENDENT

## 2024-05-30 NOTE — PROGRESS NOTES
"Subjective   Reason for Visit: Jalen Laboy is a pleasant 73 y.o. male with significant history of hypertension, CAD status post CABG x 4, hypothyroidism, interstitial granulomatous dermatitis here for a Medicare Wellness visit.     Past Medical, Surgical, and Family History reviewed and updated in chart.    Reviewed all medications by prescribing practitioner or clinical pharmacist (such as prescriptions, OTCs, herbal therapies and supplements) and documented in the medical record.    HPI    Patient is here for annual Medicare wellness visit  Health maintenance:  Colonoscopy: 2023, repeat in 5 years  PSA ordered  Annual eye exam: Scheduled ( started hydroxychloroquine a month ago)  Blood work reviewed  Up-to-date with immunizations  He has noted a cyst on the right middle finger over the DIP joint that has been present for about 3 weeks.  Not painful, denies any trauma, injury to the joint.  Has not had any erythema, pain or other joint involvement.  He will follow-up with his dermatologist for this.    Patient Care Team:  Geraldine Marsh MD as PCP - General (Internal Medicine)  Geraldine Marsh MD as PCP - [employer] PCP     Review of Systems   All other systems reviewed and are negative.      Objective   Vitals:  /60 (Patient Position: Sitting)   Pulse 63   Temp 36.2 °C (97.1 °F)   Resp 14   Ht 1.778 m (5' 10\")   Wt 96 kg (211 lb 11.2 oz)   SpO2 96%   BMI 30.38 kg/m²       Physical Exam  Constitutional:       General: He is not in acute distress.     Appearance: Normal appearance.   HENT:      Head: Normocephalic and atraumatic.   Eyes:      General: No scleral icterus.     Conjunctiva/sclera: Conjunctivae normal.   Cardiovascular:      Rate and Rhythm: Normal rate and regular rhythm.      Heart sounds: Normal heart sounds.   Pulmonary:      Effort: Pulmonary effort is normal.      Breath sounds: Normal breath sounds. No wheezing.   Abdominal:      General: Bowel sounds are normal. " There is no distension.      Palpations: Abdomen is soft.      Tenderness: There is no abdominal tenderness.   Musculoskeletal:      Cervical back: Neck supple.      Right lower leg: No edema.      Left lower leg: No edema.   Lymphadenopathy:      Cervical: No cervical adenopathy.   Skin:     General: Skin is warm and dry.      Comments: Small cystic lesion over the right middle finger DIP   Neurological:      General: No focal deficit present.      Mental Status: He is alert and oriented to person, place, and time.   Psychiatric:         Mood and Affect: Mood normal.         Behavior: Behavior normal.         Assessment/Plan   Problem List Items Addressed This Visit    None  Visit Diagnoses       Medicare annual wellness visit, subsequent    -  Primary    Encounter for screening for other disorder        Prostate cancer screening        Relevant Orders    Prostate Spec.Ag,Screen                I have personally performed a face to face diagnostic evaluation on this patient. I have reviewed the ACP note and agree with the history, exam and plan of care, except as noted.

## 2024-06-05 NOTE — PROGRESS NOTES
Primary Care Physician: Geraldine Marsh MD      Date of Visit: 06/06/2024  8:40 AM EDT  Location of visit: [facility]   Type of Visit: Established Patient     HPI / Summary:   Patient is a 73 year old man with h/o HTN with aortic aneurysm (4.5 -4.6 cm) with coronary artery calcifications fond to have 3 vessel disease who underwent CABG x 4 and ascending aortic replacement with 30mm Gelweave graft on May 5 2022 who returns  for followup     CAD risk factors: former smoker, no DM, no FHx of CAD, mildly elevated lipids  Pt with moderately dilated ascending aorta which appears to be stable in size since 2019. Was also found to have coronary artery calcifications. Echo shows trileaflet aortic valve.  April 24 2022: CTA with heart flow : CAC total 514.8 ( .5, LCx 0, .3  LM no significant disease, LAD: proximal to mid focal 70% stenosis, Ramus with proximal to mid focal calcific stenosis up to 70%, RCA focal calcific plaque. Ascending aorta 4..6 cm. CT FFR showed mid LAD stenosis FF 0.57, OM1 0.62, distal RCA 0,9  Cardiac cath 4/29/2022: ( [facility]) - LAD proximal 80%, OM1 90%, Ramus 80%, RPL proximal 50%  5/4/2022; CABG x 4( LIMA-LAD, QOJM-WJDNP-CUr, Radial-RCA. and AA replacement with 30mm Gelweave graft ( [facility])   Hospital course was uneventful and pt was d/c to home to continue imdur for 1 month for radial artery ( to prevent spasm). d/c to home 5/11/2022     Patient was doing very well though in the fall developed a total body rash which was eventually biopsied by dermatology who felt this was a drug rash. Initially they thought was due to his statin so statin was helped for 30 days. The rash peristed despite being off statin for over 30 days, so that was resumed and later thought  it was due to his hydrochlorothiazide which has since been stopped. Rash came back when prednisone was tapered so was seen by allergy and rash was biopsied again. Now dx was autoimmune disease. 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 Rash is markedly improving.      He lives in fourth floor walkup and has no issues walking up to home.  Also uses antibiotic prophylaxis for dental work ( azithromycin d/t PCN allergy).  Previously had been exercising  without CP or SOB. However  has had significant  knee pain so currently doing no dedicated exercise though does go up his 4 flights to home.  Denies any palpitations presyncope or syncope.  Has no myalgias on rosuvastatin 20 mg daily. BP at home running about 120/60s-70smmHg. Has no dizziness presyncope or syncope. Has had LE swelling since back on a higher dose of prednisone and has gained about 7 lbs without any changes in diet.     ROS: Full 10 pt review of symptoms of negative unless discussed above.     Problems:   Patient Active Problem List   Diagnosis    Benign essential HTN    Abnormal laboratory test    Aortic aneurysm, thoracic (CMS-HCC)    Benign neoplasm of skin    Bilateral sensorineural hearing loss    CAD (coronary artery disease)    Epidermal cyst    Fullness in ear, bilateral    Hypothyroidism    Left wrist pain    Left wrist tendonitis    Lesion of skin of scalp    Myopia, bilateral    Osteoarthritis of carpometacarpal (CMC) joint of thumb    Other hypertrophic disorders of the skin    Pain of both elbows    Hypertension    Pseudogout of right wrist    S/P ascending aortic replacement    S/P CABG x 4    Serous otitis media    Single seizure (Multi)    Other seborrheic keratosis    Thrombocytopenia (CMS-HCC)    Presbyopia of both eyes    Age-related nuclear cataract of both eyes    Interstitial granulomatous dermatitis     Medical History:   Past Medical History:   Diagnosis Date    Heart disease     Personal history of other benign neoplasm     History of benign neoplasm of skin    Unspecified convulsions (Multi)     Single seizure     Surgical Hx:   Past Surgical History:   Procedure  "Laterality Date    CARDIAC SURGERY      CT ANGIO CORONARY ART WITH HEARTFLOW IF SCORE >30%  04/19/2022    CT HEART CORONARY ANGIOGRAM 4/19/2022 [facility] ANCILLARY LEGACY      Social Hx:   Tobacco Use: Medium Risk (6/6/2024)    Patient History     Smoking Tobacco Use: Former     Smokeless Tobacco Use: Never     Passive Exposure: Not on file     Alcohol Use: Not At Risk (5/30/2024)    AUDIT-C     Frequency of Alcohol Consumption: 2-3 times a week     Average Number of Drinks: 1 or 2     Frequency of Binge Drinking: Never     Family Hx:   Family History   Problem Relation Name Age of Onset    Hypertension Mother Latosha       Exam:   Vitals:   Vitals:    06/06/24 0845   BP: 101/61   BP Location: Left arm   Patient Position: Sitting   BP Cuff Size: Adult   Pulse: 60   SpO2: 98%   Weight: 98.9 kg (218 lb)   Height: 1.778 m (5' 10\")     Wt Readings from Last 5 Encounters:   06/06/24 98.9 kg (218 lb)   05/30/24 96 kg (211 lb 11.2 oz)   05/23/24 96.5 kg (212 lb 12.8 oz)   05/06/24 96.6 kg (213 lb)   04/29/24 96.1 kg (211 lb 14.4 oz)      Constitutional:       Appearance: Healthy appearance. Not in distress.   Pulmonary:      Effort: Pulmonary effort is normal.      Breath sounds: Normal breath sounds.   Cardiovascular:      PMI at left midclavicular line. Normal rate. Regular rhythm. S1 with normal intensity. S2 with normal intensity.       Murmurs: There is no murmur.   Pulses:     Intact distal pulses.   Edema:     Ankle: bilateral 1+ edema of the ankle.  Neurological:      Mental Status: Alert and oriented to person, place, and time.       Labs:   Recent Labs     12/07/23  0722 06/20/23  0705 05/26/22  1017 05/11/22  0625 05/10/22  0608 05/09/22  0621 05/08/22  0647 05/07/22  0726   WBC 5.1 3.7* 3.8* 7.5 6.3 7.5 11.1 15.7*   HGB 13.7 15.0 10.1* 8.8* 8.3* 8.7* 8.6* 9.7*   HCT 41.1 45.0 32.9* 25.6* 25.7* 25.8* 25.5* 27.9*   * 101* 299 166 186 75* 132* 128*   MCV 85 84 88 85 91 86 90 87     Recent Labs     12/07/23  0722 " 06/20/23  0705 09/07/22  0834 08/05/22  0815 07/27/22  1032 05/26/22  1017 05/11/22  0625 05/10/22  0608    139 136 137 136 135* 135* 138   K 3.6 3.7 4.6 4.6 4.9 4.1 3.7 3.4*    100 97* 97* 98 97* 99 100   BUN 15 14 12 11 11 11 12 17   CREATININE 0.84 0.77 0.75 0.77 0.73 0.67 0.54 0.53       Lab Results   Component Value Date    CHOL 90 03/11/2024    HDL 38.2 03/11/2024    LDLF 54 06/20/2023    TRIG 53 03/11/2024   LDL 3/11/2024:  41    Notable Studies: imaging personally reviewed and summarized by me below  EKG:  Encounter Date: 12/11/23   ECG 12 lead (Clinic Performed)   Result Value    Ventricular Rate 72    Atrial Rate 72    KS Interval 178    QRS Duration 76    QT Interval 394    QTC Calculation(Bazett) 431    P Axis 54    R Axis 63    T Axis 35    QRS Count 12    Q Onset 223    P Onset 134    P Offset 198    T Offset 420    QTC Fredericia 418    Narrative    Normal sinus rhythm  Possible Left atrial enlargement  Septal infarct , age undetermined  Abnormal ECG  Confirmed by Carlos Qureshi ([id number]) on 12/11/2023 10:25:16 AM     12 lead EKG 6/6/2024: NSR at 60 bpm, right axis and septal Q waves ( unchanged from prior)     Echo:  - Echo (4/5/2023)  PHYSICIAN INTERPRETATION:  Left Ventricle: The left ventricular systolic function is normal, with an estimated ejection fraction of 60-65%. The calculated ejection fraction is normal at 63 % using the Hu's Bi-plane MOD calculation. There are no regional wall motion abnormalities. The left ventricular cavity size is normal. The left ventricular septal wall thickness is mildly increased. There is normal left ventricular posterior wall thickness. Abnormal (paradoxical) septal motion consistent with post-operative status. Spectral Doppler shows a normal pattern of left ventricular diastolic filling.  Left Atrium: The left atrium is moderately dilated. There has been an interval increase in the Left Atrial Volume Index from38.4 ml/m2 to 47.1 ml/m2.  Right  Ventricle: The right ventricle is normal in size. There is normal right ventricular global systolic function.  Right Atrium: The right atrium is mildly dilated.  Aortic Valve: The aortic valve is trileaflet. There is no evidence of aortic valve regurgitation. The peak instantaneous gradient of the aortic valve is 8.5 mmHg. The mean gradient of the aortic valve is 3.0 mmHg.  Mitral Valve: The mitral valve is mildly thickened. There is trace mitral valve regurgitation.  Tricuspid Valve: The tricuspid valve is structurally normal. There is mild tricuspid regurgitation. The Doppler estimated RVSP is mildly elevated at 39.7 mmHg.  Pulmonic Valve: The pulmonic valve is structurally normal. There is physiologic pulmonic valve regurgitation.  Pericardium: There is no pericardial effusion noted.  Aorta: The aortic root is abnormal. There is no dilatation of the ascending aorta. There is no dilatation of the aortic root. There is a prosthetic graft seen in the position of the ascending aorta. Aorta is known to be a 30 mm Gelweave Graft placed 5/5/2022.  Systemic Veins: The inferior vena cava appears to be of normal size.  In comparison to the previous echocardiogram(s): Compared with study from 2/23/2022,. Patient is known to have undergone CABG with Ascending Aorta Replacement. LVEF remains stable. Mild Increase in RVSP.        CONCLUSIONS:  1. Left ventricular systolic function is normal with a 60-65% estimated ejection fraction.  2. Abnormal septal motion consistent with post-operative status.  3. The left atrium is moderately dilated.  4. Mildly elevated RVSP.  5. Prosthetic graft in the ascending aorta position.  6. Patient is known to have undergone CABG with Ascending Aorta Replacement. LVEF remains stable. Mild Increase in RVSP.        CTA of chest 2/22/2023  FINDINGS:  Potential study limitations:  None.     THORACIC AORTA:  The patient is status post ascending aortic interposition graft  placement.  The sinotubular  junction is  postsurgical.  There is no evidence for acute aortic pathology, such as dissection,  intramural hematoma, or contained rupture.  The arch vessel branching pattern is  conventional.  All of the arch  branch vessels appear widely patent in their proximal portions.  Visualized proximal abdominal aorta is normal.  The celiac trunk, SMA and bilateral renal arteries are normal in  caliber.     REPRESENTATIVE MEASUREMENTS OF THE AORTA:  Annulus  2.1 x  2.5 cm  Root (Sinus of Valsalva)  3.4 cm  Sinotubular junction  3.3 cm  Mid ascending  3.6 cm     Mid descending  2.5 cm        CORONARY ARTERIES:  The examination is not optimized for assessment of the coronary  arteries.  There is extensive calcification of the native coronary arteries.  The patient is status post LIMA graft placement.        PULMONARY ARTERIES:  The central pulmonary arteries appear  normal (MPA-   cm, RPA-   cm,  LPA-  cm).     SYSTEMIC AND PULMONARY VEINS:  Normal systemic venous and pulmonary venous return.  The SVC and IVC are of normal caliber.  Normal pulmonary venous anatomy.     CARDIAC CHAMBERS:  Normal atrioventricular and ventriculoarterial concordance.     LEFT ATRIUM:  Normal size (Area-   cm2)     RIGHT ATRIUM:  Normal size (Area-   cm2)     INTERATRIAL SEPTUM:  Intact.     LEFT VENTRICLE:  Normal size (WAYLON-   cm)     RIGHT VENTRICLE:  Normal size (WAYLON-  cm)     INTERVENTRICULAR SEPTUM:  Intact.     AORTIC VALVE:  The aortic valve is  trileaflet in morphology.  No calcifications.     MITRAL VALVE:  No thickening/calcification.     PERICARDIUM:  There is no pericardial effusion or thickening.     CHEST:  There is thyromegaly.  There is minimal right basilar atelectasis. There is lingular and  left basilar atelectasis.  There are scattered mediastinal lymph nodes are felt to be reactive  in nature.  Esophagus is normal.     UPPER ABDOMEN:  Limited imaging through the upper abdomen reveals no abnormalities of  the visualized  organs.     BONE AND SOFT TISSUE:  No suspicious osseous abnormality.      Impression   1.  There are postoperative changes consistent with valve sparing  interposition graft placement. Post LIMA changes. No evidence of  aneurysmal dilatation.            Current Outpatient Medications   Medication Instructions    acetaminophen (Tylenol) 325 mg tablet oral, Every 6 hours    amLODIPine (NORVASC) 10 mg, oral, Daily    aspirin 81 mg EC tablet 1 tablet, oral, Daily    azithromycin (ZITHROMAX) 250 mg, oral, Daily    carvedilol (COREG) 25 mg, oral, 2 times daily    fish oil concentrate (Omega-3) 120-180 mg capsule 1 capsule, oral, Daily    hydroxychloroquine (PLAQUENIL) 200 mg, oral, 2 times daily    levothyroxine (Synthroid, Levoxyl) 137 mcg tablet 1 tablet, oral, Daily    losartan (Cozaar) 100 mg tablet TAKE 1 TABLET DAILY IN THE EVENING (DOSE INCREASED, STOP ISOSORBIDE)    multivitamin with minerals iron-free (Centrum Silver) 1 tablet, oral, Daily    predniSONE (Deltasone) 5 mg tablet Take 4 tablets (20 mg) by mouth once daily for 14 days, THEN 3 tablets (15 mg) once daily for 14 days, THEN 2 tablets (10 mg) once daily for 7 days, THEN 1 tablet (5 mg) once daily for 7 days.    rosuvastatin (CRESTOR) 20 mg, oral, Daily     Impressions and Plan:    Patient is a 73 year old man with HTN and a moderately dilated aortic aneurysm found to have extensive coronary artery calcifications found to have 3 vessel disease and is post op from CABG x 4 with ascending aortic replacement with 30mm Gelweave conduit. He is doing quite well from a cardiac perspective  performing ADLS ( including going up 4 floor walkup)  without CP or SOB, though now activities are limited by  knee pain.  Had total body rash and on biopsied and initially thought to be a drug rash. After holding various medications without improvement in rash  rash was biopsied and now though to be autoimmune. Now being treated by rheumatologist. He has no myalgias on  rosuvastatin 20 mg daily and is a target LDL < 55. Echo 4/2023 with preserved LV function with AV with stable gradients and no AI. BP is currently well controlled on med regimen. Note he has some LE swelling which may correspond to when started high dose prednisone. He is on high dose of amlodipine, so if swelling does not resolve after prednisone is tapered, will consider reducing amlodipine.   Plan  1. Aortic aneurysm- now s/p Ascending aortic replacement with 30 mm Gelweave conduit. All stable on CTA 2/23. At this point can move to every 2 year follow up CTA. ( Will be due in 2/2025). To use antibiotic prophylaxis prior to dental work( azithromycin due to PCN allergy)   2. Hypertension- continue Losartan 100 mg in the PM and and carvedilol 25 mg bid and will  amlodipine  10 mg daily. Home BP and hr checks and log for review.  3. CAD now s/p CABG- aspirin 81 mg daily,  losartan and carvedilol and rosuvastatin.   4. Hyperlipidemia- target LDL < 55. at target on rosuvastatin 20 mg daily.  5. to use antibiotic prophylaxis prior to dental work ( azithromycin due to PCN allergy)  6. Was found to be hypothyroid to explain brittle hair and nails. Now on appropriate dose of levothyroxine with clinical improvement  7. Try to slowly resume exercise as tolerated within orthopedic limitations ( knee pain).   8. Rash- continue working with rheumatology  Return to clinic in 6 months.      Patient Instructions:  If you have any questions or need cardiac medication refills, please call my office at [phone],      To reach my office please call [phone]  To schedule an appointment call [phone].           ____________________________________________________________  Mara Estrella MD  Division of Cardiovascular Medicine  [facility]  [facility]                 Patient Instructions:  If you have any questions or need cardiac medication refills, please call my office at  [phone],      To reach my office please call [phone]  To schedule an appointment call [phone].          ____________________________________________________________  Mara Estrella MD  Division of Cardiovascular Medicine  [facility]

## 2024-06-06 ENCOUNTER — OFFICE VISIT (OUTPATIENT)
Dept: CARDIOLOGY | Facility: HOSPITAL | Age: 74
End: 2024-06-06
Payer: MEDICARE

## 2024-06-06 VITALS
OXYGEN SATURATION: 98 % | BODY MASS INDEX: 31.21 KG/M2 | DIASTOLIC BLOOD PRESSURE: 61 MMHG | HEART RATE: 60 BPM | WEIGHT: 218 LBS | SYSTOLIC BLOOD PRESSURE: 101 MMHG | HEIGHT: 70 IN

## 2024-06-06 DIAGNOSIS — I10 BENIGN ESSENTIAL HTN: Primary | ICD-10-CM

## 2024-06-06 DIAGNOSIS — I25.10 CORONARY ARTERY DISEASE INVOLVING NATIVE HEART WITHOUT ANGINA PECTORIS, UNSPECIFIED VESSEL OR LESION TYPE: ICD-10-CM

## 2024-06-06 DIAGNOSIS — Z95.1 S/P CABG X 4: ICD-10-CM

## 2024-06-06 DIAGNOSIS — I10 HYPERTENSION, UNSPECIFIED TYPE: ICD-10-CM

## 2024-06-06 DIAGNOSIS — Z95.828 S/P ASCENDING AORTIC REPLACEMENT: ICD-10-CM

## 2024-06-06 LAB
ATRIAL RATE: 60 BPM
P AXIS: 58 DEGREES
P OFFSET: 192 MS
P ONSET: 138 MS
PR INTERVAL: 174 MS
Q ONSET: 225 MS
QRS COUNT: 10 BEATS
QRS DURATION: 82 MS
QT INTERVAL: 408 MS
QTC CALCULATION(BAZETT): 408 MS
QTC FREDERICIA: 408 MS
R AXIS: 108 DEGREES
T AXIS: 51 DEGREES
T OFFSET: 429 MS
VENTRICULAR RATE: 60 BPM

## 2024-06-06 PROCEDURE — 99214 OFFICE O/P EST MOD 30 MIN: CPT | Performed by: INTERNAL MEDICINE

## 2024-06-06 PROCEDURE — 93005 ELECTROCARDIOGRAM TRACING: CPT | Performed by: INTERNAL MEDICINE

## 2024-06-06 NOTE — PATIENT INSTRUCTIONS
1. Aortic aneurysm- now s/p Ascending aortic replacement with 30 mm Gelweave conduit. All stable on CTA 2/23. At this point can move to every 2 year follow up CTA. ( Will be due in 2/2025). To use antibiotic prophylaxis prior to dental work( azithromycin due to PCN allergy)   2. Hypertension- BP well controlled these days on current regimen ( despite even being on prednisone.) To continue Losartan 100 mg in the PM and and carvedilol 25 mg bid and amlodipine to 10 mg daily. Now has LE swelling that appears to have started with prednisone rx. If persists after completing prednisone taper will consider lowering dose of amlodipine.  3. CAD now s/p CABG- aspirin 81 mg daily,  losartan,carvedilol and rosuvastatin 20 mg daily  4. Hyperlipidemia- target LDL < 55.  At target LDL on rosuvastatin 20 mg daily ( LDL 41 march 2024)  5. to use antibiotic prophylaxis prior to dental work ( azithromycin due to PCN allergy)  6. Was found to be hypothyroid to explain brittle hair and nails. Now on appropriate dose of levothyroxine with clinical improvement  7. Try to slowly resume exercise as tolerated within orthopedic limitations ( knee pain).  Return to clinic in 6 months

## 2024-06-13 ENCOUNTER — TELEPHONE (OUTPATIENT)
Dept: RHEUMATOLOGY | Facility: HOSPITAL | Age: 74
End: 2024-06-13

## 2024-06-13 ENCOUNTER — LAB (OUTPATIENT)
Dept: LAB | Facility: LAB | Age: 74
End: 2024-06-13
Payer: MEDICARE

## 2024-06-13 DIAGNOSIS — L93.2 CUTANEOUS LUPUS ERYTHEMATOSUS: Primary | ICD-10-CM

## 2024-06-13 DIAGNOSIS — Z12.5 PROSTATE CANCER SCREENING: ICD-10-CM

## 2024-06-13 DIAGNOSIS — R21 RASH AND NONSPECIFIC SKIN ERUPTION: ICD-10-CM

## 2024-06-13 DIAGNOSIS — L93.2 CUTANEOUS LUPUS ERYTHEMATOSUS: ICD-10-CM

## 2024-06-13 LAB
APPEARANCE UR: CLEAR
BASOPHILS # BLD AUTO: 0.01 X10*3/UL (ref 0–0.1)
BASOPHILS NFR BLD AUTO: 0.2 %
BILIRUB UR STRIP.AUTO-MCNC: NEGATIVE MG/DL
C3 SERPL-MCNC: 122 MG/DL (ref 87–200)
C4 SERPL-MCNC: 38 MG/DL (ref 10–50)
CCP IGG SERPL-ACNC: <1 U/ML
CK SERPL-CCNC: 71 U/L (ref 0–325)
COLOR UR: YELLOW
CREAT UR-MCNC: 132.3 MG/DL (ref 20–370)
CRP SERPL-MCNC: 0.3 MG/DL
EOSINOPHIL # BLD AUTO: 0.02 X10*3/UL (ref 0–0.4)
EOSINOPHIL NFR BLD AUTO: 0.4 %
ERYTHROCYTE [DISTWIDTH] IN BLOOD BY AUTOMATED COUNT: 13.8 % (ref 11.5–14.5)
ERYTHROCYTE [SEDIMENTATION RATE] IN BLOOD BY WESTERGREN METHOD: 5 MM/H (ref 0–20)
GLUCOSE UR STRIP.AUTO-MCNC: NORMAL MG/DL
HCT VFR BLD AUTO: 43.5 % (ref 41–52)
HGB BLD-MCNC: 14.2 G/DL (ref 13.5–17.5)
IMM GRANULOCYTES # BLD AUTO: 0.03 X10*3/UL (ref 0–0.5)
IMM GRANULOCYTES NFR BLD AUTO: 0.5 % (ref 0–0.9)
KETONES UR STRIP.AUTO-MCNC: NEGATIVE MG/DL
LEUKOCYTE ESTERASE UR QL STRIP.AUTO: NEGATIVE
LYMPHOCYTES # BLD AUTO: 1.96 X10*3/UL (ref 0.8–3)
LYMPHOCYTES NFR BLD AUTO: 34.4 %
MCH RBC QN AUTO: 28 PG (ref 26–34)
MCHC RBC AUTO-ENTMCNC: 32.6 G/DL (ref 32–36)
MCV RBC AUTO: 86 FL (ref 80–100)
MONOCYTES # BLD AUTO: 1.33 X10*3/UL (ref 0.05–0.8)
MONOCYTES NFR BLD AUTO: 23.4 %
NEUTROPHILS # BLD AUTO: 2.34 X10*3/UL (ref 1.6–5.5)
NEUTROPHILS NFR BLD AUTO: 41.1 %
NITRITE UR QL STRIP.AUTO: NEGATIVE
NRBC BLD-RTO: 0 /100 WBCS (ref 0–0)
PH UR STRIP.AUTO: 5.5 [PH]
PLATELET # BLD AUTO: 97 X10*3/UL (ref 150–450)
PROT UR STRIP.AUTO-MCNC: NEGATIVE MG/DL
PROT UR-ACNC: 9 MG/DL (ref 5–25)
PROT/CREAT UR: 0.07 MG/MG CREAT (ref 0–0.17)
PSA SERPL-MCNC: 0.96 NG/ML
RBC # BLD AUTO: 5.08 X10*6/UL (ref 4.5–5.9)
RBC # UR STRIP.AUTO: NEGATIVE /UL
RHEUMATOID FACT SER NEPH-ACNC: <10 IU/ML (ref 0–15)
SP GR UR STRIP.AUTO: 1.02
UROBILINOGEN UR STRIP.AUTO-MCNC: NORMAL MG/DL
WBC # BLD AUTO: 5.7 X10*3/UL (ref 4.4–11.3)

## 2024-06-16 ENCOUNTER — LAB REQUISITION (OUTPATIENT)
Dept: LAB | Facility: HOSPITAL | Age: 74
End: 2024-06-16
Payer: MEDICARE

## 2024-06-16 DIAGNOSIS — L98.9 DISORDER OF THE SKIN AND SUBCUTANEOUS TISSUE, UNSPECIFIED: ICD-10-CM

## 2024-06-16 PROCEDURE — 87075 CULTR BACTERIA EXCEPT BLOOD: CPT

## 2024-06-16 PROCEDURE — 87070 CULTURE OTHR SPECIMN AEROBIC: CPT

## 2024-06-16 PROCEDURE — 87205 SMEAR GRAM STAIN: CPT

## 2024-06-18 LAB
BACTERIA SPEC CULT: NORMAL
GRAM STN SPEC: NORMAL
GRAM STN SPEC: NORMAL

## 2024-06-26 ENCOUNTER — APPOINTMENT (OUTPATIENT)
Dept: DERMATOLOGY | Facility: CLINIC | Age: 74
End: 2024-06-26
Payer: MEDICARE

## 2024-06-26 ENCOUNTER — LAB (OUTPATIENT)
Dept: LAB | Facility: LAB | Age: 74
End: 2024-06-26
Payer: MEDICARE

## 2024-06-26 DIAGNOSIS — H04.033 ENLARGEMENT OF BOTH LACRIMAL GLANDS: ICD-10-CM

## 2024-06-26 DIAGNOSIS — R21 RASH AND OTHER NONSPECIFIC SKIN ERUPTION: ICD-10-CM

## 2024-06-26 DIAGNOSIS — R21 RASH AND OTHER NONSPECIFIC SKIN ERUPTION: Primary | ICD-10-CM

## 2024-06-26 LAB
ALBUMIN SERPL BCP-MCNC: 4.7 G/DL (ref 3.4–5)
ALP SERPL-CCNC: 63 U/L (ref 33–136)
ALT SERPL W P-5'-P-CCNC: 25 U/L (ref 10–52)
ANION GAP SERPL CALC-SCNC: 16 MMOL/L (ref 10–20)
AST SERPL W P-5'-P-CCNC: 21 U/L (ref 9–39)
BILIRUB SERPL-MCNC: 1.7 MG/DL (ref 0–1.2)
BUN SERPL-MCNC: 13 MG/DL (ref 6–23)
CALCIUM SERPL-MCNC: 10.6 MG/DL (ref 8.6–10.6)
CHLORIDE SERPL-SCNC: 98 MMOL/L (ref 98–107)
CO2 SERPL-SCNC: 32 MMOL/L (ref 21–32)
CREAT SERPL-MCNC: 0.7 MG/DL (ref 0.5–1.3)
EGFRCR SERPLBLD CKD-EPI 2021: >90 ML/MIN/1.73M*2
GLUCOSE SERPL-MCNC: 77 MG/DL (ref 74–99)
HBV CORE AB SER QL: NONREACTIVE
HBV SURFACE AB SER-ACNC: <3.1 MIU/ML
HBV SURFACE AG SERPL QL IA: NONREACTIVE
HCV AB SER QL: NONREACTIVE
POTASSIUM SERPL-SCNC: 4 MMOL/L (ref 3.5–5.3)
PROT SERPL-MCNC: 7.7 G/DL (ref 6.4–8.2)
SODIUM SERPL-SCNC: 142 MMOL/L (ref 136–145)

## 2024-06-26 NOTE — PATIENT INSTRUCTIONS
It was a pleasure seeing you in Dr. Park's clinic today. 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 This is a condition that can be triggered by medications most commonly, autoimmune disease, or cancer. We already discussed that we have investigated for certain malignancies and have not found anything suspicious. We did find elevated autoimmune antibodies on your labs, specifically called an NESSA and Anti-RNP, however these elevated labs are not specific for any specific disease. Still, the autoimmune labs are our best clue to what may be causing this rash.  We have not entirely ruled out that a medication could also be the cause. We would like to work with your cardiologist to switch out your medications one by one. Each medications must be discontinued for at least 3 months to properly determine whether it could be the causative medication.    We also discussed potential treatment options which include Humira or Stelara. Both of these medications are antibody injections. We will have to communicate with your rheumatologist to ensure they are okay with starting Humira as well. We will order a few labs today in anticipation of starting Humira.     We discussed potential treatment options    When the condition is caused by a medication it can take 2-3 months for the rash to improve. It is hard to say if your rash worsened due to discontinuation of atorvastatin or due to decreasing dose of the treatment with prednisone. We would like you to talk to your cardiologist about starting a non-statin medication and discontinuation of rosuvastatin. If it is not improving while you are off statins, then we might consider having you discontinue another medication.   We also took two biopsies today to do some additional testing to confirm the diagnosis. We will contact you in 1-2 weeks with the biopsy results. We will determine when we would like you to follow up once your  biopsy and labs results return.

## 2024-06-26 NOTE — PROGRESS NOTES
Subjective     Jalen Laboy is a 73 y.o. male who presents for the following: granulomatosis dermatitis (Placquenil 200mg bid/Pt currently prednisone dose -currently taking 10mg/Optho appt next week).     Mr. Laboy presents today for follow up of a granulomatous dermatitis. He first presented in April of 2024 to Dr. Park. Summarizing his history to this point, patient has an ongoing dermatitis present since July of 2023 which initially started following intense sun exposure. He had an outside biopsy at [facility] suggestive of interstitial granulomatous dermatitis. Patient underwent a 3 month discontinuation of Atorvastatin without resolution of his dermatitis. He has also previously been on prednisone twice, each with significant improvement followed by recurrence with discontinuation.    Patient was seen by Dr. Park in April of 2024. At his visit he had workup for possible driving causes. His NESSA and Anti-RNP are elevated. He was started on Hydroxychloroquine 200mg BID.     He also saw Rheumatology in May of 2024 for evaluation of osteoarthritis. He was started on a prednisone taper, 20mg for 2 weeks, 15mg for 2 weeks, 10mg for 2 weeks, 5mg for 2 weeks. He believes his rash has improved to some degree but has not resolved.     Patient has not seen ophthalmology yet but states he has an appointment next week.    He denies any fevers, chills, night sweats, fatigue, decreased appetite, joint pain, joint swelling, joint stiffness. Actually notes his joint pain has improved while on prednisone.    Review of Systems:  No other skin or systemic complaints other than what is documented elsewhere in the note.    The following portions of the chart were reviewed this encounter and updated as appropriate:          Skin Cancer History  No skin cancer on file.      Specialty Problems          Dermatology Problems    Benign neoplasm of skin     Formatting of this note might be different from the original. NEVI  Formatting of this note might be different from the original. Overview: NEVI         Epidermal cyst    Other hypertrophic disorders of the skin    Other seborrheic keratosis    Lesion of skin of scalp    Interstitial granulomatous dermatitis        Objective   Well appearing patient in no apparent distress; mood and affect are within normal limits.    A focused skin examination was performed. All findings within normal limits unless otherwise noted below.    Assessment/Plan   1. Rash and other nonspecific skin eruption  Left Breast  In a generalized distribution there are innumerable apple jelly brown colored thin flat-topped papules without surface change. Newer papules in some regions, fewer in some regions                      Generalized granulomatous dermatitis  - Biopsy results from 4/2024 demonstrating a lymphistiocytic infiltrate, specifically with pallisaded histiocytes  - Discussed with patient that the cause of his granulomatous dermatitis has not yet been determined - most common causes including medication induced, autoimmune, or malignancy  - Currently on Prednisone taper as prescribed by rheumatology in April 2024. Current dose is 10mg (completed 20mg for 2 weeks, 15mg for 2 weeks). Despite current treatment with prednisone he still has persistent dermatitis on exam with some new areas of involvement, some areas that have resolved.  - ENSSA positive 1:2560 with positive Anti-RNP (2.3)  - Greatest suspicion for autoimmune driven granulomatous dermatitis given these lab findings.   - Medication-induced process cannot entirely be ruled out and therefore it is also reasonable to continue with trials of medication discontinuation.   - Medication discontinuation trial with Atrovastatin was previously completed. Stopped Atorvastatin in December 2023 and Started Rosuvastatin in March 2024 (13 weeks total duration). During medication trial had improvement while on Prednisone but still flared with prednisone  discontinuation.  - Order of medication discontinuation should be amlodipine followed by carvedilol followed by losartan. Each medication should be discontinued for a minimum of 12 weeks in order to determine whether it is a causative agent. Will reach out to patient's cardiologist to discuss desire for medication discontinuation trial.  - Will order anti-histone antibody today  - Malignancy driven process less likely - last colonoscopy performed in 2023 with single polyp removed; CT chest performed in 2023 without pulmonary nodule identified; PSA obtained 4/2024 wnl  - Will order Angiotensin Converting Enzyme to rule out sarcoidosis  - Continue Hydroxychloroquine 200mg BID - adequate trial not yet realized as patient has been taking for 2 months    - Discussed with patient that several treatment options could be considered given the granulomatous nature of this dermatitis. We will consider treatment with Humira or Stelara as initial treatments. Later treatment considerations could also include JENNA inhibitor. Will coordinate with rheumatology whether there is approval to start Humira.   - In anticipation of starting Humira will order relevant labs including hepatitis panel. Prior T-spot obtained and negative.    RTC in 6 months    Related Procedures  Angiotensin Converting Enzyme  Anti-Histone Antibodies  Hepatitis C Antibody  Hepatitis B Surface Antigen  Hepatitis B Surface Antibody  Hepatitis B Core Antibody, Total  Comprehensive Metabolic Panel    2. Enlargement of both lacrimal glands  Right Upper Eyelid

## 2024-06-28 LAB — ACE SERPL-CCNC: 21 U/L (ref 16–85)

## 2024-07-02 LAB — HISTONE IGG SER IA-ACNC: 1 UNITS (ref 0–0.9)

## 2024-07-03 ENCOUNTER — APPOINTMENT (OUTPATIENT)
Dept: OPHTHALMOLOGY | Facility: CLINIC | Age: 74
End: 2024-07-03
Payer: MEDICARE

## 2024-07-03 DIAGNOSIS — H25.812 COMBINED FORM OF AGE-RELATED CATARACT, LEFT EYE: ICD-10-CM

## 2024-07-03 DIAGNOSIS — H25.811 COMBINED FORM OF AGE-RELATED CATARACT, RIGHT EYE: ICD-10-CM

## 2024-07-03 DIAGNOSIS — H52.4 PRESBYOPIA: ICD-10-CM

## 2024-07-03 DIAGNOSIS — H52.203 ASTIGMATISM OF BOTH EYES, UNSPECIFIED TYPE: ICD-10-CM

## 2024-07-03 DIAGNOSIS — Z79.899 LONG-TERM USE OF HYDROXYCHLOROQUINE: Primary | ICD-10-CM

## 2024-07-03 DIAGNOSIS — H52.13 MYOPIA OF BOTH EYES: ICD-10-CM

## 2024-07-03 DIAGNOSIS — H18.522 ANTERIOR BASEMENT MEMBRANE DYSTROPHY (ABMD) OF LEFT EYE: ICD-10-CM

## 2024-07-03 ASSESSMENT — REFRACTION_WEARINGRX
OD_CYLINDER: -1.00
OS_AXIS: 065
OD_SPHERE: -2.25
OS_SPHERE: -2.25
OD_AXIS: 110
OS_CYLINDER: -1.50
OD_ADD: +2.50
OS_ADD: +2.50

## 2024-07-03 ASSESSMENT — CONF VISUAL FIELD
OS_SUPERIOR_NASAL_RESTRICTION: 0
OS_SUPERIOR_TEMPORAL_RESTRICTION: 0
OS_INFERIOR_NASAL_RESTRICTION: 0
OD_INFERIOR_TEMPORAL_RESTRICTION: 0
OD_NORMAL: 1
OD_INFERIOR_NASAL_RESTRICTION: 0
OS_NORMAL: 1
OS_INFERIOR_TEMPORAL_RESTRICTION: 0
OD_SUPERIOR_TEMPORAL_RESTRICTION: 0
OD_SUPERIOR_NASAL_RESTRICTION: 0

## 2024-07-03 ASSESSMENT — ENCOUNTER SYMPTOMS
RESPIRATORY NEGATIVE: 0
CONSTITUTIONAL NEGATIVE: 0
PSYCHIATRIC NEGATIVE: 0
MUSCULOSKELETAL NEGATIVE: 0
NEUROLOGICAL NEGATIVE: 0
CARDIOVASCULAR NEGATIVE: 0
ENDOCRINE NEGATIVE: 0
GASTROINTESTINAL NEGATIVE: 0
EYES NEGATIVE: 1
ALLERGIC/IMMUNOLOGIC NEGATIVE: 0
HEMATOLOGIC/LYMPHATIC NEGATIVE: 0

## 2024-07-03 ASSESSMENT — EXTERNAL EXAM - RIGHT EYE: OD_EXAM: NORMAL, BROW PTOSIS

## 2024-07-03 ASSESSMENT — VISUAL ACUITY
OD_CC+: -1
OS_CC: 20/25
OS_CC+: +2
METHOD: SNELLEN - LINEAR
OD_CC: 20/20
CORRECTION_TYPE: GLASSES

## 2024-07-03 ASSESSMENT — SLIT LAMP EXAM - LIDS
COMMENTS: GOOD POSITION
COMMENTS: GOOD POSITION

## 2024-07-03 ASSESSMENT — TONOMETRY
IOP_METHOD: GOLDMANN APPLANATION
OD_IOP_MMHG: 18
OS_IOP_MMHG: 18

## 2024-07-03 ASSESSMENT — EXTERNAL EXAM - LEFT EYE: OS_EXAM: NORMAL, BROW PTOSIS

## 2024-07-03 ASSESSMENT — CUP TO DISC RATIO
OS_RATIO: 0.3
OD_RATIO: 0.3

## 2024-07-03 NOTE — PROGRESS NOTES
LOV 12/6/23 - Dr. Babcock      Long term use of hydroxychloroquine  -On Hydroxychloroquine 200mg PO BID since 4/24/24. Color plates = 11/11 (7/3/24).  -OCT macula (July 3, 2024) - SS: 8/10 OD and 6/10 OS. Normal thickness and contour OU. Intact EZ OU. No edema OU. 284/276.    -HVF 10-2 (July 3, 2024) - machine unavailable today  -No history of renal/liver issues.   -Low risk of toxicity at this time given short time on medication, but discussed that risk to vision increases as length of time on medication increases. Recommend routine monitoring. Advised to call immediately if experiences any change in visual acuity/color vision.   -F/u 1 year for comprehensive exam and repeat color plates, HVF 10-2 and OCT macula.  Rheumatologist: Dr. Raymond Alba    Anterior basement membrane dystrophy, left eye  -6/2024 - History of corneal abrasion OS - rubbed his eye in his sleep, went to urgent care. Patient used erythromycin ophthalmic ointment, and pain resolved.   -Advised to use artificial tears (Refresh, Systane, Theratears, Genteal, Blink, iVizia). Discussed risk of recurrent erosions with this condition. Advised to call/return to office if any pain, redness, tearing.     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 Monitor.     Myopia  Astigmatism  Presbyopia  -Current Rx from 12/6/23 - continue current glasses.       No history of intraocular surgery/refractive surgery.   No FH of AMD/glaucoma

## 2024-07-21 DIAGNOSIS — L30.8 INTERSTITIAL GRANULOMATOUS DERMATITIS: ICD-10-CM

## 2024-07-23 RX ORDER — HYDROXYCHLOROQUINE SULFATE 200 MG/1
TABLET, FILM COATED ORAL 2 TIMES DAILY
Qty: 180 TABLET | Refills: 3 | Status: SHIPPED | OUTPATIENT
Start: 2024-07-23

## 2024-07-25 ENCOUNTER — APPOINTMENT (OUTPATIENT)
Dept: RHEUMATOLOGY | Facility: CLINIC | Age: 74
End: 2024-07-25
Payer: MEDICARE

## 2024-08-05 ENCOUNTER — TELEMEDICINE (OUTPATIENT)
Dept: CARDIOLOGY | Facility: HOSPITAL | Age: 74
End: 2024-08-05
Payer: MEDICARE

## 2024-08-05 DIAGNOSIS — Z95.828 S/P ASCENDING AORTIC REPLACEMENT: ICD-10-CM

## 2024-08-05 DIAGNOSIS — Z95.1 S/P CABG X 4: ICD-10-CM

## 2024-08-05 DIAGNOSIS — I25.10 CORONARY ARTERY DISEASE INVOLVING NATIVE HEART WITHOUT ANGINA PECTORIS, UNSPECIFIED VESSEL OR LESION TYPE: ICD-10-CM

## 2024-08-05 DIAGNOSIS — R21 RASH: ICD-10-CM

## 2024-08-05 DIAGNOSIS — I10 BENIGN ESSENTIAL HTN: Primary | ICD-10-CM

## 2024-08-05 DIAGNOSIS — E78.5 DYSLIPIDEMIA: ICD-10-CM

## 2024-08-05 RX ORDER — HYDROCHLOROTHIAZIDE 25 MG/1
25 TABLET ORAL DAILY
Qty: 30 TABLET | Refills: 11 | Status: SHIPPED | OUTPATIENT
Start: 2024-08-05 | End: 2025-08-05

## 2024-08-05 NOTE — PROGRESS NOTES
Primary Care Physician: Geraldine Marsh MD      Date of Visit: 08/05/2024  1:30 PM EDT  Location of visit: [facility]   Type of Visit: Established Patient     HPI / Summary:   Patient is a 73 year old man with h/o HTN with aortic aneurysm (4.5 -4.6 cm) with coronary artery calcifications fond to have 3 vessel disease who underwent CABG x 4 and ascending aortic replacement with 30mm Gelweave graft on May 5 2022 who returns  virtually for follow-up with concern that his rash is worse and he feels may be due to the amlodipine     CAD risk factors: former smoker, no DM, no FHx of CAD, mildly elevated lipids  Pt with moderately dilated ascending aorta which appears to be stable in size since 2019. Was also found to have coronary artery calcifications. Echo shows trileaflet aortic valve.  April 24 2022: CTA with heart flow : CAC total 514.8 ( .5, LCx 0, .3  LM no significant disease, LAD: proximal to mid focal 70% stenosis, Ramus with proximal to mid focal calcific stenosis up to 70%, RCA focal calcific plaque. Ascending aorta 4..6 cm. CT FFR showed mid LAD stenosis FF 0.57, OM1 0.62, distal RCA 0,9  Cardiac cath 4/29/2022: ( [facility]) - LAD proximal 80%, OM1 90%, Ramus 80%, RPL proximal 50%  5/4/2022; CABG x 4( LIMA-LAD, DZLK-LYFKP-EPp, Radial-RCA. and AA replacement with 30mm Gelweave graft ( [facility])   Hospital course was uneventful and pt was d/c to home to continue imdur for 1 month for radial artery ( to prevent spasm). d/c to home 5/11/2022     Patient was doing very well though in the fall developed a total body rash which was eventually biopsied by dermatology who felt this was a drug rash. Initially they thought was due to his statin so statin was helped for 30 days. The rash peristed despite being off statin for over 30 days, so that was resumed and later thought  it was due to his hydrochlorothiazide which has since been stopped. Rash came back when prednisone was tapered so was seen by  allergy and rash was biopsied again. Now dx was autoimmune disease. 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     More recently pt felt amlodipine may be cause of his rash, and stopped in on 7/11/2024. He noted some decrease in redness and itch after that. BP went up when off the amlodipine and felt the redness immediately  returned as well as the itch. He stopped it again on 7/29/2024. After stopping the  amlopidine, still red, however raised rash is getting better and much less itching. His Bp now running 140s/ 80smmHg HR is 60s bpm. Since he has had this itch/rash issue, he has stopped the rosuvastatin and has not resumed, has stopped the hydrochlorothiazide and now has stopped the amlodipine which appears to be the first one that is helping reduce the itch and raised rash. He denies CP or SOB palpitations presyncope or syncope. Also now that he is off the prednisone his weight has returned to baseline and his LE swelling has resolved.     ROS: Full 10 pt review of symptoms of negative unless discussed above.     Problems:   Patient Active Problem List   Diagnosis    Benign essential HTN    Abnormal laboratory test    Aortic aneurysm, thoracic (CMS-HCC)    Benign neoplasm of skin    Bilateral sensorineural hearing loss    CAD (coronary artery disease)    Epidermal cyst    Fullness in ear, bilateral    Hypothyroidism    Left wrist pain    Left wrist tendonitis    Lesion of skin of scalp    Myopia of both eyes    Osteoarthritis of carpometacarpal (CMC) joint of thumb    Other hypertrophic disorders of the skin    Pain of both elbows    Hypertension    Pseudogout of right wrist    S/P ascending aortic replacement    S/P CABG x 4    Serous otitis media    Single seizure (Multi)    Other seborrheic keratosis    Thrombocytopenia (CMS-HCC)    Presbyopia    Age-related nuclear cataract of both eyes    Interstitial granulomatous dermatitis    Long-term use  of hydroxychloroquine    Combined form of age-related cataract, right eye    Combined form of age-related cataract, left eye    Astigmatism of both eyes    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     Medical History:   Past Medical History:   Diagnosis Date    Heart disease     Personal history of other benign neoplasm     History of benign neoplasm of skin    Unspecified convulsions (Multi)     Single seizure     Surgical Hx:   Past Surgical History:   Procedure Laterality Date    CARDIAC SURGERY      CT ANGIO CORONARY ART WITH HEARTFLOW IF SCORE >30%  04/19/2022    CT HEART CORONARY ANGIOGRAM 4/19/2022 [facility] ANCILLARY LEGACY      Social Hx:   Tobacco Use: Medium Risk (7/3/2024)    Patient History     Smoking Tobacco Use: Former     Smokeless Tobacco Use: Never     Passive Exposure: Not on file     Alcohol Use: Not At Risk (5/30/2024)    AUDIT-C     Frequency of Alcohol Consumption: 2-3 times a week     Average Number of Drinks: 1 or 2     Frequency of Binge Drinking: Never     Family Hx:   Family History   Problem Relation Name Age of Onset    Hypertension Mother Latosha       Exam:   Vitals: There were no vitals filed for this visit.  Wt Readings from Last 5 Encounters:   06/06/24 98.9 kg (218 lb)   05/30/24 96 kg (211 lb 11.2 oz)   05/23/24 96.5 kg (212 lb 12.8 oz)   05/06/24 96.6 kg (213 lb)   04/29/24 96.1 kg (211 lb 14.4 oz)      Constitutional:       Appearance: Healthy appearance. Not in distress.   Pulmonary:      Effort: Pulmonary effort is normal.   Neurological:      Mental Status: Alert and oriented to person, place, and time.       Labs:   Recent Labs     06/13/24  0817 12/07/23  0722 06/20/23  0705 05/26/22  1017 05/11/22  0625 05/10/22  0608 05/09/22  0621 05/08/22  0647   WBC 5.7 5.1 3.7* 3.8* 7.5 6.3 7.5 11.1   HGB 14.2 13.7 15.0 10.1* 8.8* 8.3* 8.7* 8.6*   HCT 43.5 41.1 45.0 32.9* 25.6* 25.7* 25.8* 25.5*   PLT 97* 143* 101* 299 166 186 75* 132*   MCV 86 85 84 88 85 91 86 90     Recent  Labs     06/26/24  1515 12/07/23  0722 06/20/23  0705 09/07/22  0834 08/05/22  0815 07/27/22  1032 05/26/22  1017 05/11/22  0625    140 139 136 137 136 135* 135*   K 4.0 3.6 3.7 4.6 4.6 4.9 4.1 3.7   CL 98 101 100 97* 97* 98 97* 99   BUN 13 15 14 12 11 11 11 12   CREATININE 0.70 0.84 0.77 0.75 0.77 0.73 0.67 0.54        Lab Results   Component Value Date    CHOL 90 03/11/2024    HDL 38.2 03/11/2024    LDLF 54 06/20/2023    TRIG 53 03/11/2024   LDL 3/11/2024  41  Notable Studies: imaging personally reviewed and summarized by me below  EKG:  Encounter Date: 06/06/24   ECG 12 lead (Clinic Performed)   Result Value    Ventricular Rate 60    Atrial Rate 60    MT Interval 174    QRS Duration 82    QT Interval 408    QTC Calculation(Bazett) 408    P Axis 58    R Axis 108    T Axis 51    QRS Count 10    Q Onset 225    P Onset 138    P Offset 192    T Offset 429    QTC Fredericia 408    Narrative    Normal sinus rhythm  Rightward axis  Abnormal ECG  When compared with ECG of 11-DEC-2023 09:20,  QRS axis Shifted right  Confirmed by Anderson Fermin ([id number]) on 6/14/2024 3:29:43 PM     - Echo (4/5/2023)  PHYSICIAN INTERPRETATION:  Left Ventricle: The left ventricular systolic function is normal, with an estimated ejection fraction of 60-65%. The calculated ejection fraction is normal at 63 % using the Hu's Bi-plane MOD calculation. There are no regional wall motion abnormalities. The left ventricular cavity size is normal. The left ventricular septal wall thickness is mildly increased. There is normal left ventricular posterior wall thickness. Abnormal (paradoxical) septal motion consistent with post-operative status. Spectral Doppler shows a normal pattern of left ventricular diastolic filling.  Left Atrium: The left atrium is moderately dilated. There has been an interval increase in the Left Atrial Volume Index from38.4 ml/m2 to 47.1 ml/m2.  Right Ventricle: The right ventricle is normal in size. There is normal right  ventricular global systolic function.  Right Atrium: The right atrium is mildly dilated.  Aortic Valve: The aortic valve is trileaflet. There is no evidence of aortic valve regurgitation. The peak instantaneous gradient of the aortic valve is 8.5 mmHg. The mean gradient of the aortic valve is 3.0 mmHg.  Mitral Valve: The mitral valve is mildly thickened. There is trace mitral valve regurgitation.  Tricuspid Valve: The tricuspid valve is structurally normal. There is mild tricuspid regurgitation. The Doppler estimated RVSP is mildly elevated at 39.7 mmHg.  Pulmonic Valve: The pulmonic valve is structurally normal. There is physiologic pulmonic valve regurgitation.  Pericardium: There is no pericardial effusion noted.  Aorta: The aortic root is abnormal. There is no dilatation of the ascending aorta. There is no dilatation of the aortic root. There is a prosthetic graft seen in the position of the ascending aorta. Aorta is known to be a 30 mm Gelweave Graft placed 5/5/2022.  Systemic Veins: The inferior vena cava appears to be of normal size.  In comparison to the previous echocardiogram(s): Compared with study from 2/23/2022,. Patient is known to have undergone CABG with Ascending Aorta Replacement. LVEF remains stable. Mild Increase in RVSP.        CONCLUSIONS:  1. Left ventricular systolic function is normal with a 60-65% estimated ejection fraction.  2. Abnormal septal motion consistent with post-operative status.  3. The left atrium is moderately dilated.  4. Mildly elevated RVSP.  5. Prosthetic graft in the ascending aorta position.  6. Patient is known to have undergone CABG with Ascending Aorta Replacement. LVEF remains stable. Mild Increase in RVSP.        CTA of chest 2/22/2023  FINDINGS:  Potential study limitations:  None.     THORACIC AORTA:  The patient is status post ascending aortic interposition graft  placement.  The sinotubular junction is  postsurgical.  There is no evidence for acute aortic  pathology, such as dissection,  intramural hematoma, or contained rupture.  The arch vessel branching pattern is  conventional.  All of the arch  branch vessels appear widely patent in their proximal portions.  Visualized proximal abdominal aorta is normal.  The celiac trunk, SMA and bilateral renal arteries are normal in  caliber.     REPRESENTATIVE MEASUREMENTS OF THE AORTA:  Annulus  2.1 x  2.5 cm  Root (Sinus of Valsalva)  3.4 cm  Sinotubular junction  3.3 cm  Mid ascending  3.6 cm     Mid descending  2.5 cm        CORONARY ARTERIES:  The examination is not optimized for assessment of the coronary  arteries.  There is extensive calcification of the native coronary arteries.  The patient is status post LIMA graft placement.        PULMONARY ARTERIES:  The central pulmonary arteries appear  normal (MPA-   cm, RPA-   cm,  LPA-  cm).     SYSTEMIC AND PULMONARY VEINS:  Normal systemic venous and pulmonary venous return.  The SVC and IVC are of normal caliber.  Normal pulmonary venous anatomy.     CARDIAC CHAMBERS:  Normal atrioventricular and ventriculoarterial concordance.     LEFT ATRIUM:  Normal size (Area-   cm2)     RIGHT ATRIUM:  Normal size (Area-   cm2)     INTERATRIAL SEPTUM:  Intact.     LEFT VENTRICLE:  Normal size (WAYLON-   cm)     RIGHT VENTRICLE:  Normal size (WAYLON-  cm)     INTERVENTRICULAR SEPTUM:  Intact.     AORTIC VALVE:  The aortic valve is  trileaflet in morphology.  No calcifications.     MITRAL VALVE:  No thickening/calcification.     PERICARDIUM:  There is no pericardial effusion or thickening.     CHEST:  There is thyromegaly.  There is minimal right basilar atelectasis. There is lingular and  left basilar atelectasis.  There are scattered mediastinal lymph nodes are felt to be reactive  in nature.  Esophagus is normal.     UPPER ABDOMEN:  Limited imaging through the upper abdomen reveals no abnormalities of  the visualized organs.     BONE AND SOFT TISSUE:  No suspicious osseous abnormality.       Impression   1.  There are postoperative changes consistent with valve sparing  interposition graft placement. Post LIMA changes. No evidence of  aneurysmal dilatation.                  Current Outpatient Medications   Medication Instructions    acetaminophen (Tylenol) 325 mg tablet oral, Every 6 hours    amLODIPine (NORVASC) 10 mg, oral, Daily    aspirin 81 mg EC tablet 1 tablet, oral, Daily    azithromycin (ZITHROMAX) 250 mg, oral, Daily    carvedilol (COREG) 25 mg, oral, 2 times daily    fish oil concentrate (Omega-3) 120-180 mg capsule 1 capsule, oral, Daily    hydroxychloroquine (Plaquenil) 200 mg tablet oral, 2 times daily    levothyroxine (Synthroid, Levoxyl) 137 mcg tablet 1 tablet, oral, Daily    losartan (Cozaar) 100 mg tablet TAKE 1 TABLET DAILY IN THE EVENING (DOSE INCREASED, STOP ISOSORBIDE)    multivitamin with minerals iron-free (Centrum Silver) 1 tablet, oral, Daily    rosuvastatin (CRESTOR) 20 mg, oral, Daily     Impressions and Plan:    Patient is a 73 year old man with HTN and a moderately dilated aortic aneurysm found to have extensive coronary artery calcifications found to have 3 vessel disease and is post op from CABG x 4 with ascending aortic replacement with 30mm Gelweave conduit. He is doing quite well from a cardiac perspective  performing ADLS ( including going up 4 floor walkup)  without CP or SOB, though now activities are limited by  knee pain.  Had total body rash and on biopsied and initially thought to be a drug rash. After holding various medications without improvement in rash  rash was biopsied and now though to be autoimmune. Now being treated by rheumatologist. Note that after his prednisone was stopped his rash came back and was spreading. He had no myalgias on rosuvastatin 20 mg daily and was at target LDL < 55, however he has since stopped taking the statin given persistence of rash. He also remains off hydrochlorothiazide. He recently found some literature suggesting that  his rash could be from  amlodipine so he stopped that. In that setting the itch improved and the rash started to improve. Very soon after resuming the amlodpine the itch came back and the raised rash came back and got worse. He has since stopped it again with improvement. Of note, his Bp is elevated.   Plan  1. Aortic aneurysm- now s/p Ascending aortic replacement with 30 mm Gelweave conduit. All stable on CTA 2/23. At this point can move to every 2 year follow up CTA. ( Will be due in 2/2025). To use antibiotic prophylaxis prior to dental work( azithromycin due to PCN allergy)   2. Hypertension- continue Losartan 100 mg in the PM and and carvedilol 25 mg bid. Amlodipine has been stopped with apparent improvement in rash.  and  Home BP and hr checks and log for review.Will need to add something to replace amlodipine.Will discuss starting nifedipine vs hydrochlorothiazide. ( ? Cross reactivity for nifedipine)   3. CAD now s/p CABG- aspirin 81 mg daily,  losartan and carvedilol and (rosuvastatin) Pt has stopped taking rosuvastatin. Will likely add med to control BP first , then add rosuvastatin back in to medical regimen. .   4. Hyperlipidemia- target LDL < 55. Was at target on rosuvastatin 20 mg daily. Now on hold.  5. to use antibiotic prophylaxis prior to dental work ( azithromycin due to PCN allergy)  8. Rash- continue working with rheumatology and dermatology  Return to clinic at previously scheduled appointment in December. Will likely set up a virtual appointment between now and then to check on response to med changes.       Discussed with pharmacist- will resume hydrochlorothiazide for now while looking into the possible cross reactivity between amlodipine and nifedipidine    Patient Instructions:  If you have any questions or need cardiac medication refills, please call my office at [phone],      To reach my office please call [phone]  To schedule an appointment call [phone].           ____________________________________________________________  Mara Estrella MD  Division of Cardiovascular Medicine  [facility]

## 2024-08-14 ENCOUNTER — LAB (OUTPATIENT)
Dept: LAB | Facility: LAB | Age: 74
End: 2024-08-14
Payer: MEDICARE

## 2024-08-14 ENCOUNTER — APPOINTMENT (OUTPATIENT)
Dept: RHEUMATOLOGY | Facility: CLINIC | Age: 74
End: 2024-08-14
Payer: MEDICARE

## 2024-08-14 VITALS
HEART RATE: 70 BPM | TEMPERATURE: 97.5 F | HEIGHT: 70 IN | OXYGEN SATURATION: 93 % | BODY MASS INDEX: 29.98 KG/M2 | SYSTOLIC BLOOD PRESSURE: 127 MMHG | WEIGHT: 209.4 LBS | DIASTOLIC BLOOD PRESSURE: 66 MMHG

## 2024-08-14 DIAGNOSIS — L30.8 INTERSTITIAL GRANULOMATOUS DERMATITIS: ICD-10-CM

## 2024-08-14 DIAGNOSIS — L93.2 CUTANEOUS LUPUS ERYTHEMATOSUS: ICD-10-CM

## 2024-08-14 DIAGNOSIS — L93.2 CUTANEOUS LUPUS ERYTHEMATOSUS: Primary | ICD-10-CM

## 2024-08-14 LAB
ALBUMIN SERPL BCP-MCNC: 4.1 G/DL (ref 3.4–5)
ALP SERPL-CCNC: 73 U/L (ref 33–136)
ALT SERPL W P-5'-P-CCNC: 16 U/L (ref 10–52)
ANION GAP SERPL CALC-SCNC: 11 MMOL/L (ref 10–20)
AST SERPL W P-5'-P-CCNC: 17 U/L (ref 9–39)
BASOPHILS # BLD AUTO: 0.01 X10*3/UL (ref 0–0.1)
BASOPHILS NFR BLD AUTO: 0.2 %
BILIRUB SERPL-MCNC: 1 MG/DL (ref 0–1.2)
BUN SERPL-MCNC: 11 MG/DL (ref 6–23)
C3 SERPL-MCNC: 135 MG/DL (ref 87–200)
C4 SERPL-MCNC: 50 MG/DL (ref 10–50)
CALCIUM SERPL-MCNC: 10.2 MG/DL (ref 8.6–10.6)
CHLORIDE SERPL-SCNC: 97 MMOL/L (ref 98–107)
CO2 SERPL-SCNC: 35 MMOL/L (ref 21–32)
CREAT SERPL-MCNC: 0.78 MG/DL (ref 0.5–1.3)
CRP SERPL-MCNC: 2.24 MG/DL
EGFRCR SERPLBLD CKD-EPI 2021: >90 ML/MIN/1.73M*2
EOSINOPHIL # BLD AUTO: 0.02 X10*3/UL (ref 0–0.4)
EOSINOPHIL NFR BLD AUTO: 0.5 %
ERYTHROCYTE [DISTWIDTH] IN BLOOD BY AUTOMATED COUNT: 12.9 % (ref 11.5–14.5)
ERYTHROCYTE [SEDIMENTATION RATE] IN BLOOD BY WESTERGREN METHOD: 6 MM/H (ref 0–20)
GLUCOSE SERPL-MCNC: 99 MG/DL (ref 74–99)
HCT VFR BLD AUTO: 44.1 % (ref 41–52)
HGB BLD-MCNC: 14.5 G/DL (ref 13.5–17.5)
IMM GRANULOCYTES # BLD AUTO: 0.02 X10*3/UL (ref 0–0.5)
IMM GRANULOCYTES NFR BLD AUTO: 0.5 % (ref 0–0.9)
LYMPHOCYTES # BLD AUTO: 1.58 X10*3/UL (ref 0.8–3)
LYMPHOCYTES NFR BLD AUTO: 37.5 %
MCH RBC QN AUTO: 27.6 PG (ref 26–34)
MCHC RBC AUTO-ENTMCNC: 32.9 G/DL (ref 32–36)
MCV RBC AUTO: 84 FL (ref 80–100)
MONOCYTES # BLD AUTO: 1.11 X10*3/UL (ref 0.05–0.8)
MONOCYTES NFR BLD AUTO: 26.4 %
NEUTROPHILS # BLD AUTO: 1.47 X10*3/UL (ref 1.6–5.5)
NEUTROPHILS NFR BLD AUTO: 34.9 %
NRBC BLD-RTO: 0 /100 WBCS (ref 0–0)
PLATELET # BLD AUTO: 115 X10*3/UL (ref 150–450)
POTASSIUM SERPL-SCNC: 3.5 MMOL/L (ref 3.5–5.3)
PROT SERPL-MCNC: 6.8 G/DL (ref 6.4–8.2)
RBC # BLD AUTO: 5.25 X10*6/UL (ref 4.5–5.9)
SODIUM SERPL-SCNC: 139 MMOL/L (ref 136–145)
WBC # BLD AUTO: 4.2 X10*3/UL (ref 4.4–11.3)

## 2024-08-14 RX ORDER — FOLIC ACID 1 MG/1
1 TABLET ORAL DAILY
Qty: 30 TABLET | Refills: 11 | Status: SHIPPED | OUTPATIENT
Start: 2024-08-14 | End: 2025-08-14

## 2024-08-14 RX ORDER — PREDNISONE 5 MG/1
TABLET ORAL
Qty: 212 TABLET | Refills: 0 | Status: SHIPPED | OUTPATIENT
Start: 2024-08-14 | End: 2024-11-22

## 2024-08-14 RX ORDER — METHOTREXATE 2.5 MG/1
12.5 TABLET ORAL
Qty: 30 TABLET | Refills: 2 | Status: SHIPPED | OUTPATIENT
Start: 2024-08-18 | End: 2024-12-16

## 2024-08-14 RX ORDER — HYDROXYCHLOROQUINE SULFATE 200 MG/1
200 TABLET, FILM COATED ORAL 2 TIMES DAILY
Qty: 180 TABLET | Refills: 3 | Status: SHIPPED | OUTPATIENT
Start: 2024-08-14

## 2024-08-14 NOTE — PROGRESS NOTES
Subjective   Patient ID: Jalen Laboy is a 73 y.o. male who presents for Follow-up (Meds).    HPI  74 yo male with HTN, HLD, IHD  He denies any joint pain or AM stiffness, Raynaud  He denies any malar rash, but he has photosensitive  He had a skin rashes in his body, legs  His rashes started last summer, he used some topical treatment  Derm saw him and started PRD and his rashes improved, but when he tapered off PRD, his rashes came back  Then, his NESSA came positive.  3-4 weeks ago, they started  mg daily.  Interstitial granulomatous dermatitis  He had an open heart surgery 2 years ago  He was using statin and still using it now.  He is also using levithyroxin, he had hypothryoidisim in 01/23  No family h/o SLE, CTD or inflammatory arthritis  Using HCQ  NESSA 1/2560  ESR 14  CRP 1.23 (3.7)  RNP 2.3  Histon 1.0  06/23:  WBC 3.7, PLt 101    Interval history:  He stopped PRD 2 months ago and his skin rashes came back  He reports skin rashes all over his body especially in is upper extremity, back and arms  He is using  regularly    ROS  Joint pain in hands: negative   Joint swelling: negative  Morning stiffness and duration: negative   strength: normal  Oral ulcer: negative  Genital ulcer: negative  Raynaud phenomenon: negative  Chest pain/dyspnea: negative  Low back pain: negative  Visual problem: negative  Dry eyes/dry mouth: negative  Skin rash/scaling/psoriasis: negative       Objective     PEXAM  VS reviewed, WNL  General: Alert, no distress   HEENT: Normocephalic/atraumatic, No alopecia. PERRLA. Sclera white, conjunctiva pink, no malar rash. no oral or nasal ulcer. Oral cavity pink and moist, no erythema or exudate, dentition good.   Neck: supple  Respiratory: CTA B, no adventitious breath sounds  Cardiac: RRR, no murmurs, carotid, or bruits  Abdominal: symmetrical, soft, non-tender, non-distended, normoactive BSx4 quadrants, no CVA tenderness or suprapubic tenderness  MSK: Joints of upper and  lower extremities were assessed for synovitis and ROM.    Today she has no evidence of synovitis in the joints of her hands or wrists, tender joint count 0, swollen joint count 0   Extremities: no clubbing, no cyanosis, no edema  Skin: Skin warm and moist.   Neuro: non-focal, Strength 5/5 throughout. Normal gait. No cerebellar pathologic exam     Assessment/Plan   74 yo male with HTN, HLD, CHD and hypothyroidisim  He had a rash in last summer, his rash was photosensitive  Before rash he was diagnosed with hypothyroidism  He used PRD and then his rash improved, but it recurred after stopping PRD.  Skin bx showed interstitial granulomatous dermatitis  His NESSA came high titer positive and his CBC showed leukopenia and low Plt at that time of rash.  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 His skin rashes improved after PRD, but when he tapered off PRD, his rashes came back.  He is concerned about Drug induced lupus because of positive histon. But, his histone is very weak pos (1.0). He was using statin and amlodipine, no other meds.  His PExam did not reveal any synovitis, it showed generalized skin rashes, palpable MP rashes.  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  -will check his lupus markers  -will restart PRD 20 and gave him an instruction for tapering, will stay at 5 mg PRD daily  -will continue HCQ, added MTX 12.5 in his treatment with folic acid  -will see him in 2-3 months

## 2024-08-15 LAB — DSDNA AB SER-ACNC: <1 IU/ML

## 2024-08-15 NOTE — PROGRESS NOTES
"Reason for Visit:   Patient is participating in a research study as per specific study detail below.   Study visit time point: Screening/baseline.   Study short name: CHIP-LUPUS  Type of Visit:   Outpatient.   -----------------------------------   This note is created in accordance with Standard Operating Procedures for Research Studies at [facility] which can be found on the intranet at: [email or web address]            PROTOCOL TITLE: \"Asymptomatic Coronary Artery Disease and Clonal Hematopoiesis of Indeterminate Potential (CHIP) in Patients with Systemic Lupus Erythematosus\" (CHIP-LUPUS)  :  Name: Raymond Alba MD  The following was discussed with the patient:  Purpose of the study  Patient rights and responsibilities  Study Procedures  Risks, benefits, and alternatives to study participation  Study financials including cost to patient  Confidentiality and privacy  Injury language  All questions were answered by the coordinator and Dr. Raymond Alba. Patient verbalized understanding of all the information given to her. Patient signed the consent, as copy of the document was given to the patient for her records.   Standard of care procedures and assessments were performed by Dr. Raymond Alba. Study related information was documented in source document. Labs for standard of care ordered.     Patient left the clinic without assistance.     MARTELL CAMPBELL RN    "

## 2024-08-20 LAB — HISTONE IGG SER IA-ACNC: 1.2 UNITS (ref 0–0.9)

## 2024-10-15 DIAGNOSIS — E06.3 HYPOTHYROIDISM DUE TO HASHIMOTO'S THYROIDITIS: Primary | ICD-10-CM

## 2024-10-15 RX ORDER — LEVOTHYROXINE SODIUM 137 UG/1
137 TABLET ORAL DAILY
Qty: 90 TABLET | Refills: 3 | OUTPATIENT
Start: 2024-10-15

## 2024-10-15 NOTE — TELEPHONE ENCOUNTER
Patient had not read my chart message regarding TSH in May.  Called him as he is due for refill.  He will get labs and then will refill.

## 2024-10-18 ENCOUNTER — LAB (OUTPATIENT)
Dept: LAB | Facility: LAB | Age: 74
End: 2024-10-18
Payer: MEDICARE

## 2024-10-18 DIAGNOSIS — L93.2 CUTANEOUS LUPUS ERYTHEMATOSUS: ICD-10-CM

## 2024-10-18 DIAGNOSIS — E06.3 HYPOTHYROIDISM DUE TO HASHIMOTO'S THYROIDITIS: ICD-10-CM

## 2024-10-18 DIAGNOSIS — R21 RASH AND NONSPECIFIC SKIN ERUPTION: ICD-10-CM

## 2024-10-18 LAB
APPEARANCE UR: CLEAR
BILIRUB UR STRIP.AUTO-MCNC: NEGATIVE MG/DL
COLOR UR: YELLOW
CREAT UR-MCNC: 130.5 MG/DL (ref 20–370)
GLUCOSE UR STRIP.AUTO-MCNC: NORMAL MG/DL
KETONES UR STRIP.AUTO-MCNC: NEGATIVE MG/DL
LEUKOCYTE ESTERASE UR QL STRIP.AUTO: NEGATIVE
MUCOUS THREADS #/AREA URNS AUTO: NORMAL /LPF
NITRITE UR QL STRIP.AUTO: NEGATIVE
PH UR STRIP.AUTO: 6.5 [PH]
PROT UR STRIP.AUTO-MCNC: NORMAL MG/DL
PROT UR-ACNC: 9 MG/DL (ref 5–25)
PROT/CREAT UR: 0.07 MG/MG CREAT (ref 0–0.17)
RBC # UR STRIP.AUTO: NEGATIVE /UL
RBC #/AREA URNS AUTO: NORMAL /HPF
SP GR UR STRIP.AUTO: 1.02
T4 FREE SERPL-MCNC: 1.91 NG/DL (ref 0.78–1.48)
TSH SERPL-ACNC: 0.34 MIU/L (ref 0.44–3.98)
UROBILINOGEN UR STRIP.AUTO-MCNC: NORMAL MG/DL
WBC #/AREA URNS AUTO: NORMAL /HPF

## 2024-10-31 ENCOUNTER — APPOINTMENT (OUTPATIENT)
Dept: PRIMARY CARE | Facility: CLINIC | Age: 74
End: 2024-10-31
Payer: MEDICARE

## 2024-11-03 ENCOUNTER — PATIENT MESSAGE (OUTPATIENT)
Dept: ENDOCRINOLOGY | Facility: CLINIC | Age: 74
End: 2024-11-03
Payer: MEDICARE

## 2024-11-03 DIAGNOSIS — E06.3 HYPOTHYROIDISM DUE TO HASHIMOTO'S THYROIDITIS: Primary | ICD-10-CM

## 2024-11-04 RX ORDER — LEVOTHYROXINE SODIUM 137 UG/1
TABLET ORAL
Qty: 90 TABLET | Refills: 3 | Status: SHIPPED | OUTPATIENT
Start: 2024-11-04

## 2024-11-06 ENCOUNTER — APPOINTMENT (OUTPATIENT)
Dept: RHEUMATOLOGY | Facility: CLINIC | Age: 74
End: 2024-11-06
Payer: MEDICARE

## 2024-11-06 ENCOUNTER — LAB (OUTPATIENT)
Dept: LAB | Facility: LAB | Age: 74
End: 2024-11-06
Payer: MEDICARE

## 2024-11-06 VITALS
TEMPERATURE: 97.5 F | BODY MASS INDEX: 29.56 KG/M2 | HEART RATE: 61 BPM | DIASTOLIC BLOOD PRESSURE: 67 MMHG | SYSTOLIC BLOOD PRESSURE: 157 MMHG | OXYGEN SATURATION: 95 % | WEIGHT: 206 LBS

## 2024-11-06 DIAGNOSIS — M32.9 SLE (SYSTEMIC LUPUS ERYTHEMATOSUS RELATED SYNDROME) (MULTI): ICD-10-CM

## 2024-11-06 DIAGNOSIS — L93.2 CUTANEOUS LUPUS ERYTHEMATOSUS: Primary | ICD-10-CM

## 2024-11-06 DIAGNOSIS — L30.8 INTERSTITIAL GRANULOMATOUS DERMATITIS: ICD-10-CM

## 2024-11-06 DIAGNOSIS — L93.2 CUTANEOUS LUPUS ERYTHEMATOSUS: ICD-10-CM

## 2024-11-06 LAB
ALBUMIN SERPL BCP-MCNC: 4.1 G/DL (ref 3.4–5)
ALP SERPL-CCNC: 58 U/L (ref 33–136)
ALT SERPL W P-5'-P-CCNC: 17 U/L (ref 10–52)
ANION GAP SERPL CALC-SCNC: 14 MMOL/L (ref 10–20)
AST SERPL W P-5'-P-CCNC: 15 U/L (ref 9–39)
BASOPHILS # BLD AUTO: 0.01 X10*3/UL (ref 0–0.1)
BASOPHILS NFR BLD AUTO: 0.2 %
BILIRUB SERPL-MCNC: 1.2 MG/DL (ref 0–1.2)
BUN SERPL-MCNC: 10 MG/DL (ref 6–23)
C3 SERPL-MCNC: 136 MG/DL (ref 87–200)
C4 SERPL-MCNC: 49 MG/DL (ref 10–50)
CALCIUM SERPL-MCNC: 10.2 MG/DL (ref 8.6–10.6)
CHLORIDE SERPL-SCNC: 96 MMOL/L (ref 98–107)
CO2 SERPL-SCNC: 33 MMOL/L (ref 21–32)
CREAT SERPL-MCNC: 0.68 MG/DL (ref 0.5–1.3)
CRP SERPL-MCNC: 1.7 MG/DL
DSDNA AB SER-ACNC: <1 IU/ML
EGFRCR SERPLBLD CKD-EPI 2021: >90 ML/MIN/1.73M*2
EOSINOPHIL # BLD AUTO: 0 X10*3/UL (ref 0–0.4)
EOSINOPHIL NFR BLD AUTO: 0 %
ERYTHROCYTE [DISTWIDTH] IN BLOOD BY AUTOMATED COUNT: 13.3 % (ref 11.5–14.5)
ERYTHROCYTE [SEDIMENTATION RATE] IN BLOOD BY WESTERGREN METHOD: 7 MM/H (ref 0–20)
GLUCOSE SERPL-MCNC: 88 MG/DL (ref 74–99)
HCT VFR BLD AUTO: 42.5 % (ref 41–52)
HGB BLD-MCNC: 14.3 G/DL (ref 13.5–17.5)
IMM GRANULOCYTES # BLD AUTO: 0.03 X10*3/UL (ref 0–0.5)
IMM GRANULOCYTES NFR BLD AUTO: 0.7 % (ref 0–0.9)
LYMPHOCYTES # BLD AUTO: 1.54 X10*3/UL (ref 0.8–3)
LYMPHOCYTES NFR BLD AUTO: 34.1 %
MCH RBC QN AUTO: 27.9 PG (ref 26–34)
MCHC RBC AUTO-ENTMCNC: 33.6 G/DL (ref 32–36)
MCV RBC AUTO: 83 FL (ref 80–100)
MONOCYTES # BLD AUTO: 0.84 X10*3/UL (ref 0.05–0.8)
MONOCYTES NFR BLD AUTO: 18.6 %
NEUTROPHILS # BLD AUTO: 2.1 X10*3/UL (ref 1.6–5.5)
NEUTROPHILS NFR BLD AUTO: 46.4 %
NRBC BLD-RTO: 0 /100 WBCS (ref 0–0)
PLATELET # BLD AUTO: 128 X10*3/UL (ref 150–450)
POTASSIUM SERPL-SCNC: 4.1 MMOL/L (ref 3.5–5.3)
PROT SERPL-MCNC: 6.8 G/DL (ref 6.4–8.2)
RBC # BLD AUTO: 5.13 X10*6/UL (ref 4.5–5.9)
SODIUM SERPL-SCNC: 139 MMOL/L (ref 136–145)
WBC # BLD AUTO: 4.5 X10*3/UL (ref 4.4–11.3)

## 2024-11-06 RX ORDER — PREDNISONE 5 MG/1
5 TABLET ORAL DAILY
Qty: 120 TABLET | Refills: 0 | Status: SHIPPED | OUTPATIENT
Start: 2024-11-06 | End: 2025-03-06

## 2024-11-06 RX ORDER — METHOTREXATE 2.5 MG/1
12.5 TABLET ORAL
Qty: 30 TABLET | Refills: 2 | Status: SHIPPED | OUTPATIENT
Start: 2024-11-10 | End: 2025-03-10

## 2024-11-06 RX ORDER — HYDROXYCHLOROQUINE SULFATE 200 MG/1
200 TABLET, FILM COATED ORAL 2 TIMES DAILY
Qty: 180 TABLET | Refills: 3 | Status: SHIPPED | OUTPATIENT
Start: 2024-11-06

## 2024-11-06 RX ORDER — FOLIC ACID 1 MG/1
1 TABLET ORAL DAILY
Qty: 30 TABLET | Refills: 11 | Status: SHIPPED | OUTPATIENT
Start: 2024-11-06 | End: 2025-11-06

## 2024-11-06 NOTE — PROGRESS NOTES
Subjective   Patient ID: Jalen Laboy is a 74 y.o. male who presents for Follow-up.    HPI  74 yo male with HTN, HLD, IHD  He denies any joint pain or AM stiffness, Raynaud  He denies any malar rash, but he has photosensitive  He had a skin rashes in his body, legs  His rashes started last summer, he used some topical treatment  Derm saw him and started PRD and his rashes improved, but when he tapered off PRD, his rashes came back  Then, his NESSA came positive.  3-4 weeks ago, they started  mg daily.  Interstitial granulomatous dermatitis  He had an open heart surgery 2 years ago  He was using statin and still using it now.  He is also using levithyroxin, he had hypothryoidisim in 01/23  No family h/o SLE, CTD or inflammatory arthritis  Using HCQ     Interval history:  He stopped PRD 2 months ago and his skin rashes came back  He reports skin rashes all over his body especially in is upper extremity, back and arms  He is using  regularly    11/2024:  His rash improved significantly, having still some rash, but no itching or discomfort now.  He tapered down his PRD to 5 mg daily  He does have ear problem, ear fullness and hearing loss.    Current meds:  MTX 12.5 mg week    PRD 5 mg daily    NESSA 1/2560  ESR 14  CRP 1.23 (3.7)  RNP 2.3  Histon 1.0  06/23:  WBC 3.7, PLt 101  ROS  Joint pain in hands: negative   Joint swelling: negative  Morning stiffness and duration: negative   strength: normal  Oral ulcer: negative  Genital ulcer: negative  Raynaud phenomenon: negative  Chest pain/dyspnea: negative  Low back pain: negative  Visual problem: negative  Dry eyes/dry mouth: negative  Skin rash/scaling/psoriasis: negative       Objective     PEXAM  VS reviewed, WNL  General: Alert, no distress   HEENT: Normocephalic/atraumatic, No alopecia. PERRLA. Sclera white, conjunctiva pink, no malar rash. no oral or nasal ulcer. Oral cavity pink and moist, no erythema or exudate, dentition good.   Neck:  supple  Respiratory: CTA B, no adventitious breath sounds  Cardiac: RRR, no murmurs, carotid, or bruits  Abdominal: symmetrical, soft, non-tender, non-distended, normoactive BSx4 quadrants, no CVA tenderness or suprapubic tenderness  MSK: Joints of upper and lower extremities were assessed for synovitis and ROM.    Today she has no evidence of synovitis in the joints of her hands or wrists, tender joint count 0, swollen joint count 0   Extremities: no clubbing, no cyanosis, no edema  Skin: Skin warm and moist.   Neuro: non-focal, Strength 5/5 throughout. Normal gait. No cerebellar pathologic exam     Assessment/Plan   75 yo male with HTN, HLD, CHD and hypothyroidisim  He had a rash in last summer, his rash was photosensitive  Before rash he was diagnosed with hypothyroidism  He used PRD and then his rash improved, but it recurred after stopping PRD.  Skin bx showed interstitial granulomatous dermatitis  His NESSA came high titer positive and his CBC showed leukopenia and low Plt at that time of rash.  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 His skin rashes improved after PRD, but when he tapered off PRD, his rashes came back.  He is concerned about Drug induced lupus because of positive histon. But, his histone is very weak pos (1.0). He was using statin and amlodipine, no other meds.  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  His symptoms improved significantly, tapered down his PRD to 5 mg daily, he does have only residual mild rash, but no itching.  His PExam did not reveal any synovitis, it showed non-palpable mild rash in his arms and torso  I think he had cutaneous lupus flare, but he has also systemic lupus because his NESSA, RNP pos and he has leukopenia and thrombocytopenia.  -will check his lupus markers  -will continue PRD 5 mg , then taper it down to 2.5 mg daily  -will continue HCQ, added MTX 12.5 in his treatment with folic acid  -will  see him in 3-4 months

## 2024-12-04 NOTE — PROGRESS NOTES
Primary Care Physician: No primary care provider on file.      Date of Visit: 12/05/2024  8:20 AM EST  Location of visit: [facility]   Type of Visit: Established Patient     HPI / Summary:   Patient is a 74 year old man with h/o HTN with aortic aneurysm (4.5 -4.6 cm) with coronary artery calcifications fond to have 3 vessel disease who underwent CABG x 4 and ascending aortic replacement with 30mm Gelweave graft on May 5 2022 who returns   for follow-up      CAD risk factors: former smoker, no DM, no FHx of CAD, mildly elevated lipids  Pt with moderately dilated ascending aorta which appears to be stable in size since 2019. Was also found to have coronary artery calcifications. Echo shows trileaflet aortic valve.  April 24 2022: CTA with heart flow : CAC total 514.8 ( .5, LCx 0, .3  LM no significant disease, LAD: proximal to mid focal 70% stenosis, Ramus with proximal to mid focal calcific stenosis up to 70%, RCA focal calcific plaque. Ascending aorta 4..6 cm. CT FFR showed mid LAD stenosis FF 0.57, OM1 0.62, distal RCA 0,9  Cardiac cath 4/29/2022: ( [facility]) - LAD proximal 80%, OM1 90%, Ramus 80%, RPL proximal 50%  5/4/2022; CABG x 4( LIMA-LAD, FHUW-ISASF-UPy, Radial-RCA. and AA replacement with 30mm Gelweave graft ( [facility])   Hospital course was uneventful and pt was d/c to home to continue imdur for 1 month for radial artery ( to prevent spasm). d/c to home 5/11/2022     Patient was doing very well though in the fall of 2023 developed a total body rash which was eventually biopsied by dermatology who felt this was a drug rash. Initially they thought was due to his statin so statin was helped for 30 days. The rash peristed despite being off statin for over 30 days, so that was resumed and later thought  it was due to his hydrochlorothiazide which has since been stopped. Rash came back when prednisone was tapered so was seen by allergy and rash was biopsied again. Now dx was autoimmune disease.  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    More recently pt felt amlodipine may be cause of his rash, and stopped in on 7/11/2024. He noted some decrease in redness and itch after that. BP went up when off the amlodipine and felt the redness immediately  returned as well as the itch. He stopped it again on 7/29/2024. After stopping the  amlopidine, still red, however raised rash is getting better and much less itching.    Pts BP at home running 1120-120/70smmHg  He continues on prednisone and plaquenil for cutaneous lupus. He is not currently doing any exercise though is able to go up to 4th floow walked up and do ADLs without CP or SOB. He quit drinking due to meds he was on. He has no palpitations dizziness presyncope or syncope. Rash has markedly improved ( not as red or raised). He has been off rosuvastatin, and when tried to resumed, rash started to come back so discontinued.        ROS: Full 10 pt review of symptoms of negative unless discussed above.     Problems:   Patient Active Problem List   Diagnosis    Benign essential HTN    Abnormal laboratory test    Aortic aneurysm, thoracic (CMS-HCC)    Benign neoplasm of skin    Bilateral sensorineural hearing loss    CAD (coronary artery disease)    Epidermal cyst    Fullness in ear, bilateral    Hypothyroidism    Left wrist pain    Left wrist tendonitis    Lesion of skin of scalp    Myopia of both eyes    Osteoarthritis of carpometacarpal (CMC) joint of thumb    Other hypertrophic disorders of the skin    Pain of both elbows    Hypertension    Pseudogout of right wrist    S/P ascending aortic replacement    S/P CABG x 4    Serous otitis media    Single seizure (Multi)    Other seborrheic keratosis    Thrombocytopenia (CMS-HCC)    Presbyopia    Age-related nuclear cataract of both eyes    Interstitial granulomatous dermatitis    Long-term use of hydroxychloroquine    Combined form of age-related  "cataract, right eye    Combined form of age-related cataract, left eye    Astigmatism of both eyes    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     Medical History:   Past Medical History:   Diagnosis Date    Heart disease     Personal history of other benign neoplasm     History of benign neoplasm of skin    Unspecified convulsions (Multi)     Single seizure     Surgical Hx:   Past Surgical History:   Procedure Laterality Date    CARDIAC SURGERY      CT ANGIO CORONARY ART WITH HEARTFLOW IF SCORE >30%  04/19/2022    CT HEART CORONARY ANGIOGRAM 4/19/2022 [facility]      Social Hx:   Tobacco Use: Medium Risk (12/7/2024)    Patient History     Smoking Tobacco Use: Former     Smokeless Tobacco Use: Never     Passive Exposure: Not on file     Alcohol Use: Not At Risk (5/30/2024)    AUDIT-C     Frequency of Alcohol Consumption: 2-3 times a week     Average Number of Drinks: 1 or 2     Frequency of Binge Drinking: Never     Family Hx:   Family History   Problem Relation Name Age of Onset    Hypertension Mother Latosha       Exam:   Vitals:   Vitals:    12/05/24 0814   BP: 130/75   BP Location: Left arm   Patient Position: Sitting   Pulse: 64   SpO2: 97%   Weight: 93.2 kg (205 lb 6.4 oz)   Height: 1.778 m (5' 10\")     Wt Readings from Last 5 Encounters:   12/05/24 93.2 kg (205 lb 6.4 oz)   11/06/24 93.4 kg (206 lb)   08/14/24 95 kg (209 lb 6.4 oz)   06/25/24 95.3 kg (210 lb 1.6 oz)   06/16/24 96.2 kg (212 lb 1.3 oz)      Constitutional:       Appearance: Healthy appearance. Not in distress.   Pulmonary:      Effort: Pulmonary effort is normal.      Breath sounds: Normal breath sounds.   Cardiovascular:      PMI at left midclavicular line. Normal rate. Regular rhythm. S1 with normal intensity. S2 with normal intensity.       Murmurs: There is no murmur.   Pulses:     Intact distal pulses.   Edema:     Peripheral edema absent.   Neurological:      Mental Status: Alert and oriented to person, place, and " time.       Labs:   Recent Labs     11/06/24  1515 08/14/24  1629 06/13/24  0817 12/07/23  0722 06/20/23  0705 05/26/22  1017 05/11/22  0625 05/10/22  0608   WBC 4.5 4.2* 5.7 5.1 3.7* 3.8* 7.5 6.3   HGB 14.3 14.5 14.2 13.7 15.0 10.1* 8.8* 8.3*   HCT 42.5 44.1 43.5 41.1 45.0 32.9* 25.6* 25.7*   * 115* 97* 143* 101* 299 166 186   MCV 83 84 86 85 84 88 85 91     Recent Labs     11/06/24  1515 08/14/24  1629 06/26/24  1515 12/07/23  0722 06/20/23  0705 09/07/22  0834 08/05/22  0815 07/27/22  1032    139 142 140 139 136 137 136   K 4.1 3.5 4.0 3.6 3.7 4.6 4.6 4.9   CL 96* 97* 98 101 100 97* 97* 98   BUN 10 11 13 15 14 12 11 11   CREATININE 0.68 0.78 0.70 0.84 0.77 0.75 0.77 0.73        Lab Results   Component Value Date    CHOL 148 12/06/2024    HDL 31.4 12/06/2024    LDLF 54 06/20/2023    TRIG 102 12/06/2024     Lab Results   Component Value Date    LDLCALC 96 12/06/2024      Notable Studies: imaging personally reviewed and summarized by me below  EKG:  Encounter Date: 06/06/24   ECG 12 lead (Clinic Performed)   Result Value    Ventricular Rate 60    Atrial Rate 60    WY Interval 174    QRS Duration 82    QT Interval 408    QTC Calculation(Bazett) 408    P Axis 58    R Axis 108    T Axis 51    QRS Count 10    Q Onset 225    P Onset 138    P Offset 192    T Offset 429    QTC Fredericia 408    Narrative    Normal sinus rhythm  Rightward axis  Abnormal ECG  When compared with ECG of 11-DEC-2023 09:20,  QRS axis Shifted right  Confirmed by Anderson Fermin ([id number]) on 6/14/2024 3:29:43 PM         - Echo (4/5/2023)  PHYSICIAN INTERPRETATION:  Left Ventricle: The left ventricular systolic function is normal, with an estimated ejection fraction of 60-65%. The calculated ejection fraction is normal at 63 % using the Hu's Bi-plane MOD calculation. There are no regional wall motion abnormalities. The left ventricular cavity size is normal. The left ventricular septal wall thickness is mildly increased. There is  normal left ventricular posterior wall thickness. Abnormal (paradoxical) septal motion consistent with post-operative status. Spectral Doppler shows a normal pattern of left ventricular diastolic filling.  Left Atrium: The left atrium is moderately dilated. There has been an interval increase in the Left Atrial Volume Index from38.4 ml/m2 to 47.1 ml/m2.  Right Ventricle: The right ventricle is normal in size. There is normal right ventricular global systolic function.  Right Atrium: The right atrium is mildly dilated.  Aortic Valve: The aortic valve is trileaflet. There is no evidence of aortic valve regurgitation. The peak instantaneous gradient of the aortic valve is 8.5 mmHg. The mean gradient of the aortic valve is 3.0 mmHg.  Mitral Valve: The mitral valve is mildly thickened. There is trace mitral valve regurgitation.  Tricuspid Valve: The tricuspid valve is structurally normal. There is mild tricuspid regurgitation. The Doppler estimated RVSP is mildly elevated at 39.7 mmHg.  Pulmonic Valve: The pulmonic valve is structurally normal. There is physiologic pulmonic valve regurgitation.  Pericardium: There is no pericardial effusion noted.  Aorta: The aortic root is abnormal. There is no dilatation of the ascending aorta. There is no dilatation of the aortic root. There is a prosthetic graft seen in the position of the ascending aorta. Aorta is known to be a 30 mm Gelweave Graft placed 5/5/2022.  Systemic Veins: The inferior vena cava appears to be of normal size.  In comparison to the previous echocardiogram(s): Compared with study from 2/23/2022,. Patient is known to have undergone CABG with Ascending Aorta Replacement. LVEF remains stable. Mild Increase in RVSP.        CONCLUSIONS:  1. Left ventricular systolic function is normal with a 60-65% estimated ejection fraction.  2. Abnormal septal motion consistent with post-operative status.  3. The left atrium is moderately dilated.  4. Mildly elevated RVSP.  5.  Prosthetic graft in the ascending aorta position.  6. Patient is known to have undergone CABG with Ascending Aorta Replacement. LVEF remains stable. Mild Increase in RVSP.        CTA of chest 2/22/2023  FINDINGS:  Potential study limitations:  None.     THORACIC AORTA:  The patient is status post ascending aortic interposition graft  placement.  The sinotubular junction is  postsurgical.  There is no evidence for acute aortic pathology, such as dissection,  intramural hematoma, or contained rupture.  The arch vessel branching pattern is  conventional.  All of the arch  branch vessels appear widely patent in their proximal portions.  Visualized proximal abdominal aorta is normal.  The celiac trunk, SMA and bilateral renal arteries are normal in  caliber.     REPRESENTATIVE MEASUREMENTS OF THE AORTA:  Annulus  2.1 x  2.5 cm  Root (Sinus of Valsalva)  3.4 cm  Sinotubular junction  3.3 cm  Mid ascending  3.6 cm     Mid descending  2.5 cm        CORONARY ARTERIES:  The examination is not optimized for assessment of the coronary  arteries.  There is extensive calcification of the native coronary arteries.  The patient is status post LIMA graft placement.        PULMONARY ARTERIES:  The central pulmonary arteries appear  normal (MPA-   cm, RPA-   cm,  LPA-  cm).     SYSTEMIC AND PULMONARY VEINS:  Normal systemic venous and pulmonary venous return.  The SVC and IVC are of normal caliber.  Normal pulmonary venous anatomy.     CARDIAC CHAMBERS:  Normal atrioventricular and ventriculoarterial concordance.     LEFT ATRIUM:  Normal size (Area-   cm2)     RIGHT ATRIUM:  Normal size (Area-   cm2)     INTERATRIAL SEPTUM:  Intact.     LEFT VENTRICLE:  Normal size (WAYLON-   cm)     RIGHT VENTRICLE:  Normal size (WAYLON-  cm)     INTERVENTRICULAR SEPTUM:  Intact.     AORTIC VALVE:  The aortic valve is  trileaflet in morphology.  No calcifications.     MITRAL VALVE:  No thickening/calcification.     PERICARDIUM:  There is no pericardial  effusion or thickening.     CHEST:  There is thyromegaly.  There is minimal right basilar atelectasis. There is lingular and  left basilar atelectasis.  There are scattered mediastinal lymph nodes are felt to be reactive  in nature.  Esophagus is normal.     UPPER ABDOMEN:  Limited imaging through the upper abdomen reveals no abnormalities of  the visualized organs.     BONE AND SOFT TISSUE:  No suspicious osseous abnormality.      Impression   1.  There are postoperative changes consistent with valve sparing  interposition graft placement. Post LIMA changes. No evidence of  aneurysmal dilatation.             Current Outpatient Medications   Medication Instructions    acetaminophen (Tylenol) 325 mg tablet Every 6 hours    aspirin 81 mg EC tablet 1 tablet, Daily    azithromycin (ZITHROMAX) 250 mg, Daily    carvedilol (COREG) 25 mg, oral, 2 times daily    fish oil concentrate (Omega-3) 120-180 mg capsule 1 capsule, Daily    folic acid (FOLVITE) 1 mg, oral, Daily    hydroCHLOROthiazide (HYDRODIURIL) 25 mg, oral, Daily    hydroxychloroquine (PLAQUENIL) 200 mg, oral, 2 times daily    inclisiran (LEQVIO) 284 mg, subcutaneous, Every 3 months, at 0, 3 months and then every 6 months thereafter    levothyroxine (Synthroid, Levoxyl) 137 mcg tablet Take 1 tablet once daily except 1/2 on Sunday    losartan (Cozaar) 100 mg tablet TAKE 1 TABLET DAILY IN THE EVENING (DOSE INCREASED, STOP ISOSORBIDE)    methotrexate (TREXALL) 12.5 mg, oral, Once Weekly, Follow directions carefully, and ask to explain any part you do not understand. Take exactly as directed.    multivitamin with minerals iron-free (Centrum Silver) 1 tablet, Daily    predniSONE (DELTASONE) 5 mg, oral, Daily     Impressions and Plan:    Patient is a 74 year old man with HTN and a moderately dilated aortic aneurysm found to have extensive coronary artery calcifications found to have 3 vessel disease and is post op from CABG x 4 with ascending aortic replacement with  30mm Gelweave conduit. He is doing quite well from a cardiac perspective  performing ADLS ( including going up 4 floor walkup)  without CP or SOB.  Had total body rash and on biopsied and initially thought to be a drug rash. After holding various medications without improvement in rash  rash was biopsied and now though to be autoimmune. Now being treated by rheumatologist with plaquinil and prednisone. Rash is improving. He remains off rosuvastatin and when tried resuming briefly rash worsened. BP at home appears well controlled on current regimen.   Plan  1. Aortic aneurysm- now s/p Ascending aortic replacement with 30 mm Gelweave conduit. All stable on CTA 2/23. At this point can move to every 2 year follow up CTA. ( Will be due in 2/2025). To use antibiotic prophylaxis prior to dental work( azithromycin due to PCN allergy)   2. Hypertension- continue Losartan 100 mg in the PM and and carvedilol 25 mg bid. And hydrochlorothiazide 25 mg daily. Home BP and hr checks and log for review.  3. CAD now s/p CABG- aspirin 81 mg daily,  losartan and carvedilol  Pt has stopped taking rosuvastatin. Will need a med to reach target LDL< 55. Will try to get approval for inclisiran.   4. Hyperlipidemia- target LDL < 55. to start inclisiran.   5. to use antibiotic prophylaxis prior to dental work ( azithromycin due to PCN allergy)  8. Rash- continue working with rheumatology and dermatology     return to clinic in 4 months.     Patient Instructions:  If you have any questions or need cardiac medication refills, please call my office at [phone],      To reach my office please call [phone]  To schedule an appointment call [phone].          ____________________________________________________________  Mara Estrella MD  Division of Cardiovascular Medicine  [facility]  [facility]

## 2024-12-05 ENCOUNTER — APPOINTMENT (OUTPATIENT)
Dept: CARDIOLOGY | Facility: HOSPITAL | Age: 74
End: 2024-12-05
Payer: MEDICARE

## 2024-12-05 VITALS
SYSTOLIC BLOOD PRESSURE: 130 MMHG | HEIGHT: 70 IN | HEART RATE: 64 BPM | DIASTOLIC BLOOD PRESSURE: 75 MMHG | BODY MASS INDEX: 29.41 KG/M2 | WEIGHT: 205.4 LBS | OXYGEN SATURATION: 97 %

## 2024-12-05 DIAGNOSIS — Z95.828 S/P ASCENDING AORTIC REPLACEMENT: ICD-10-CM

## 2024-12-05 DIAGNOSIS — I10 BENIGN ESSENTIAL HTN: Primary | ICD-10-CM

## 2024-12-05 DIAGNOSIS — E78.5 DYSLIPIDEMIA: ICD-10-CM

## 2024-12-05 DIAGNOSIS — Z78.9 STATIN INTOLERANCE: ICD-10-CM

## 2024-12-05 DIAGNOSIS — I25.10 CORONARY ARTERY DISEASE INVOLVING NATIVE HEART WITHOUT ANGINA PECTORIS, UNSPECIFIED VESSEL OR LESION TYPE: ICD-10-CM

## 2024-12-05 DIAGNOSIS — Z95.1 S/P CABG X 4: ICD-10-CM

## 2024-12-05 PROCEDURE — 99214 OFFICE O/P EST MOD 30 MIN: CPT | Performed by: INTERNAL MEDICINE

## 2024-12-05 NOTE — PATIENT INSTRUCTIONS
1. Aortic aneurysm- now s/p Ascending aortic replacement with 30 mm Gelweave conduit. All stable on CTA 2/23. At this point can move to every 2 year follow up CTA. ( Will be due after 2/2025). To use antibiotic prophylaxis prior to dental work( azithromycin due to PCN allergy)   2. Hypertension- BP well controlled these days on current regimen ( despite even being on prednisone.) To continue Losartan 100 mg in the PM and and carvedilol 25 mg bid and hydrochlorothiazide 25 mg daily  3. CAD now s/p CABG- aspirin 81 mg daily,  losartan,carvedilol . Off rosuvastatin due to ? Rash. Will try to get approval for Leqvio. Is due for fasting lipid panel now that he has been off statin. LDL target < 55 given prior CABG.  4. Hyperlipidemia- target LDL < 55.  At target LDL on rosuvastatin 20 mg daily ( LDL 41 march 2024), however rash worsened once tried restarting rosuvastatin. Will start Leqvio.  5. To use antibiotic prophylaxis prior to dental work ( azithromycin due to PCN allergy)  6. Was found to be hypothyroid to explain brittle hair and nails. Now on appropriate dose of levothyroxine with clinical improvement    Return to clinic in 4 months with lipid panel 1 week prior ( after starting leqvio) Also to have CTA of chest prior to visit given prior h/o ascending aortic replacement.

## 2024-12-06 ENCOUNTER — LAB (OUTPATIENT)
Dept: LAB | Facility: LAB | Age: 74
End: 2024-12-06
Payer: MEDICARE

## 2024-12-06 DIAGNOSIS — E78.5 DYSLIPIDEMIA: ICD-10-CM

## 2024-12-06 LAB
CHOLEST SERPL-MCNC: 148 MG/DL (ref 0–199)
CHOLESTEROL/HDL RATIO: 4.7
HDLC SERPL-MCNC: 31.4 MG/DL
LDLC SERPL CALC-MCNC: 96 MG/DL
NON HDL CHOLESTEROL: 117 MG/DL (ref 0–149)
TRIGL SERPL-MCNC: 102 MG/DL (ref 0–149)
VLDL: 20 MG/DL (ref 0–40)

## 2024-12-06 PROCEDURE — 80061 LIPID PANEL: CPT

## 2024-12-07 RX ORDER — ALBUTEROL SULFATE 0.83 MG/ML
3 SOLUTION RESPIRATORY (INHALATION) AS NEEDED
OUTPATIENT
Start: 2024-12-07

## 2024-12-07 RX ORDER — DIPHENHYDRAMINE HYDROCHLORIDE 50 MG/ML
50 INJECTION INTRAMUSCULAR; INTRAVENOUS AS NEEDED
OUTPATIENT
Start: 2024-12-07

## 2024-12-07 RX ORDER — EPINEPHRINE 0.3 MG/.3ML
0.3 INJECTION SUBCUTANEOUS EVERY 5 MIN PRN
OUTPATIENT
Start: 2024-12-07

## 2024-12-07 RX ORDER — FAMOTIDINE 10 MG/ML
20 INJECTION INTRAVENOUS ONCE AS NEEDED
OUTPATIENT
Start: 2024-12-07

## 2024-12-07 NOTE — RESULT ENCOUNTER NOTE
LDL not at target now that patient not on statin ( likely allergy/rash) Will work on getting approval for Inclisiran. Needs to reach LDL target < 55.

## 2024-12-16 ENCOUNTER — DOCUMENTATION (OUTPATIENT)
Dept: INFUSION THERAPY | Facility: CLINIC | Age: 74
End: 2024-12-16
Payer: MEDICARE

## 2024-12-16 NOTE — PROGRESS NOTES
"CLINICAL CLEARANCE FOR OUTPATIENT INJECTION      Patient to be scheduled for New Start of Leqvio injections.     For Diagnosis: Hypercholesteremia     Baseline labs for review:  Lipid profile drawn:   Lab Results   Component Value Date    CHOL 148 12/06/2024    CHOL 90 03/11/2024    CHOL 102 12/07/2023     Lab Results   Component Value Date    HDL 31.4 12/06/2024    HDL 38.2 03/11/2024    HDL 36.7 12/07/2023     Lab Results   Component Value Date    LDLCALC 96 12/06/2024    LDLCALC 41 03/11/2024    LDLCALC 52 12/07/2023     Lab Results   Component Value Date    TRIG 102 12/06/2024    TRIG 53 03/11/2024    TRIG 65 12/07/2023     No components found for: \"CHOLHDL\"     Last injection received: na (if continuation)  Due: anytime    Okay to schedule for treatment as ordered by prescribing provider.    "

## 2024-12-26 ENCOUNTER — LAB (OUTPATIENT)
Dept: LAB | Facility: LAB | Age: 74
End: 2024-12-26
Payer: MEDICARE

## 2024-12-26 DIAGNOSIS — I10 BENIGN ESSENTIAL HTN: ICD-10-CM

## 2024-12-26 LAB
ANION GAP SERPL CALC-SCNC: 14 MMOL/L (ref 10–20)
BUN SERPL-MCNC: 12 MG/DL (ref 6–23)
CALCIUM SERPL-MCNC: 10 MG/DL (ref 8.6–10.6)
CHLORIDE SERPL-SCNC: 97 MMOL/L (ref 98–107)
CO2 SERPL-SCNC: 33 MMOL/L (ref 21–32)
CREAT SERPL-MCNC: 0.69 MG/DL (ref 0.5–1.3)
EGFRCR SERPLBLD CKD-EPI 2021: >90 ML/MIN/1.73M*2
GLUCOSE SERPL-MCNC: 82 MG/DL (ref 74–99)
POTASSIUM SERPL-SCNC: 3.8 MMOL/L (ref 3.5–5.3)
SODIUM SERPL-SCNC: 140 MMOL/L (ref 136–145)

## 2024-12-26 PROCEDURE — 80048 BASIC METABOLIC PNL TOTAL CA: CPT

## 2024-12-30 ENCOUNTER — HOSPITAL ENCOUNTER (OUTPATIENT)
Dept: RADIOLOGY | Facility: HOSPITAL | Age: 74
Discharge: HOME | End: 2024-12-30
Payer: MEDICARE

## 2024-12-30 DIAGNOSIS — Z95.828 S/P ASCENDING AORTIC REPLACEMENT: ICD-10-CM

## 2024-12-30 DIAGNOSIS — I25.10 CORONARY ARTERY DISEASE INVOLVING NATIVE HEART WITHOUT ANGINA PECTORIS, UNSPECIFIED VESSEL OR LESION TYPE: ICD-10-CM

## 2024-12-30 PROCEDURE — 71275 CT ANGIOGRAPHY CHEST: CPT

## 2024-12-30 PROCEDURE — 2550000001 HC RX 255 CONTRASTS: Performed by: INTERNAL MEDICINE

## 2024-12-30 RX ADMIN — IOHEXOL 75 ML: 350 INJECTION, SOLUTION INTRAVENOUS at 07:31

## 2024-12-31 ENCOUNTER — APPOINTMENT (OUTPATIENT)
Dept: INFUSION THERAPY | Facility: CLINIC | Age: 74
End: 2024-12-31
Payer: MEDICARE

## 2024-12-31 VITALS
HEART RATE: 68 BPM | DIASTOLIC BLOOD PRESSURE: 72 MMHG | RESPIRATION RATE: 18 BRPM | BODY MASS INDEX: 29.23 KG/M2 | SYSTOLIC BLOOD PRESSURE: 114 MMHG | WEIGHT: 203.71 LBS | OXYGEN SATURATION: 96 % | TEMPERATURE: 97.9 F

## 2024-12-31 DIAGNOSIS — I25.10 CORONARY ARTERY DISEASE INVOLVING NATIVE HEART WITHOUT ANGINA PECTORIS, UNSPECIFIED VESSEL OR LESION TYPE: ICD-10-CM

## 2024-12-31 DIAGNOSIS — Z95.1 S/P CABG X 4: ICD-10-CM

## 2024-12-31 PROCEDURE — 96372 THER/PROPH/DIAG INJ SC/IM: CPT | Performed by: NURSE PRACTITIONER

## 2024-12-31 RX ORDER — EPINEPHRINE 0.3 MG/.3ML
0.3 INJECTION SUBCUTANEOUS EVERY 5 MIN PRN
OUTPATIENT
Start: 2025-03-31

## 2024-12-31 RX ORDER — FAMOTIDINE 10 MG/ML
20 INJECTION INTRAVENOUS ONCE AS NEEDED
OUTPATIENT
Start: 2025-03-31

## 2024-12-31 RX ORDER — DIPHENHYDRAMINE HYDROCHLORIDE 50 MG/ML
50 INJECTION INTRAMUSCULAR; INTRAVENOUS AS NEEDED
OUTPATIENT
Start: 2025-03-31

## 2024-12-31 RX ORDER — ALBUTEROL SULFATE 0.83 MG/ML
3 SOLUTION RESPIRATORY (INHALATION) AS NEEDED
OUTPATIENT
Start: 2025-03-31

## 2024-12-31 RX ORDER — AMLODIPINE BESYLATE 10 MG/1
10 TABLET ORAL DAILY
COMMUNITY

## 2024-12-31 ASSESSMENT — ENCOUNTER SYMPTOMS
WOUND: 0
COUGH: 0
LEG SWELLING: 0
DIZZINESS: 0
LIGHT-HEADEDNESS: 0
NUMBNESS: 0
WHEEZING: 0
EXTREMITY WEAKNESS: 0
PALPITATIONS: 0
SHORTNESS OF BREATH: 0

## 2024-12-31 NOTE — PROGRESS NOTES
[facility]   Infusion Clinic Note   Date: 2024   Name: Jalen Laboy  : 1950   MRN: [id number]          Reason for Visit: Injections and New Patient Visit (PT HERE FOR LEQVIO 284 MG INFUSION/NEXT APPT: 90 DAYS)         Today: We administered inclisiran.       Visit Type: INJECTION       Ordered By: DR SALAS       Accompanied by:Self       Diagnosis: Coronary artery disease involving native heart without angina pectoris, unspecified vessel or lesion type    S/P CABG x 4        Allergies:   Allergies as of 2024 - Reviewed 2024   Allergen Reaction Noted    Penicillins Hives and Unknown 2007          Current Medications has a current medication list which includes the following prescription(s): acetaminophen, aspirin, azithromycin, carvedilol, fish oil concentrate, folic acid, hydrochlorothiazide, hydroxychloroquine, inclisiran, levothyroxine, losartan, methotrexate, multivitamin with minerals iron-free, prednisone, and amlodipine.       Vitals:   Vitals:    24 1303 24 1342   BP: 135/77 114/72   Pulse: 74 68   Resp: 18 18   Temp: 36.4 °C (97.5 °F) 36.6 °C (97.9 °F)   SpO2: 95% 96%   Weight: 92.4 kg (203 lb 11.3 oz)              Infusion Pre-procedure Checklist:   - Allergies reviewed: yes   - Medications reviewed: yes       - Previous reaction to current treatment: n/a      Assess patient for the concerns below. Document provider notification as appropriate.  - Active or recent infection with/without current antibiotic use: no  - Recent or planned invasive dental work: no  - Recent or planned surgeries: no  - Recently received or plans to receive vaccinations: no  - Has treatment related toxicities: n/a  - Is pregnant:  n/a      Pain: 0   - Is the pain different from normal: n/a   - Is your Doctor aware:  n/a       Labs: N/A          Fall Risk Screening: Atul Fall Risk  History of Falling, Immediate or Within 3 Months: No  Secondary Diagnosis:  No  Ambulatory Aid: Walks without aid/bedrest/nurse assist  Intravenous Therapy/Heparin Lock: No  Gait/Transferring: Normal/bedrest/immobile  Mental Status: Oriented to own ability  Pollard Fall Risk Score: 0       Review Of Systems:  Review of Systems   Respiratory:  Negative for cough, shortness of breath and wheezing.    Cardiovascular:  Negative for chest pain, leg swelling and palpitations.   Skin:  Negative for itching, rash and wound.   Neurological:  Negative for dizziness, extremity weakness, light-headedness and numbness.         ROS completed? Yes      Infusion Readiness:  - Assessment Concerns Related to Infusion: No  - Provider notified: n/a      Document Below Only If Indicated:   New Patient Education:    NEW PATIENT MEDICATION EDUCATION PT PROVIDED WITH WRITTEN ([employer] PT EDUCATION SHEET) AND VERBAL EDUCATION REGARDING MEDICATION GIVEN. VERIFIED MEDICATION NAME WITH PATIENT AND DISCUSSED REASON FOR USE. BRIEFLY DISCUSSED HOW MEDICATION WORKS AND EDUCATED ON GOAL OF TREATMENT, FREQUENCY OF TREATMENT, ADVERSE RXN'S AND COMMON SIDE EFFECTS TO MONITOR FOR. INSTRUCTED PT TO ASSURE THAT ALL PROVIDERS INCLUDING DENTISTS ARE AWARE OF MEDICATION RECEIVED. DISCUSSED FLOW OF VISIT AND ORIENTED TO INFUSION CENTER. PT VERBALIZES UNDERSTANDING. CALL LIGHT PROVIDED AND PT AWARE TO ALERT STAFF OF ANY CONCERNS DURING TREATMENT.        Treatment Conditions & Drug Specific Questions:    Inclisiran (LEQVIO)     (Unless otherwise specified on patient specific therapy plan):     REMINDER:  Recommended Vitals/Observation:  Vitals: Obtain vital signs prior to injection and at end of observation period.    Observation: Patient may leave 30 minutes after the FIRST injection. Patient may leave immediately following injection for all subsequent doses    Lab Results   Component Value Date    CHOL 148 12/06/2024    CHOL 90 03/11/2024    CHOL 102 12/07/2023     Lab Results   Component Value Date    HDL 31.4 12/06/2024    HDL 38.2  "03/11/2024    HDL 36.7 12/07/2023     Lab Results   Component Value Date    LDLCALC 96 12/06/2024    LDLCALC 41 03/11/2024    LDLCALC 52 12/07/2023     Lab Results   Component Value Date    TRIG 102 12/06/2024    TRIG 53 03/11/2024    TRIG 65 12/07/2023     No components found for: \"CHOLHDL\"         Weight Based Drug Calculations:    WEIGHT BASED DRUGS: NOT APPLICABLE / FLAT DOSE          Initiated By: Danielle Grace RN        "

## 2024-12-31 NOTE — PATIENT INSTRUCTIONS
Today :Jalen Laboy had no medications administered during this visit.     For:   1. Coronary artery disease involving native heart without angina pectoris, unspecified vessel or lesion type    2. S/P CABG x 4         Your next appointment is due in:  90 DAYS        Please read the  Medication Guide that was given to you and reviewed during todays visit.     (Tell all doctors including dentists that you are taking this medication)     Go to the emergency room or call 911 if:  -You have signs of allergic reaction:   -Rash, hives, itching.   -Swollen, blistered, peeling skin.   -Swelling of face, lips, mouth, tongue or throat.   -Tightness of chest, trouble breathing, swallowing or talking     Call your doctor:  - If IV / injection site gets red, warm, swollen, itchy or leaks fluid or pus.     (Leave dressing on your IV site for at least 2 hours and keep area clean and dry  - If you get sick or have symptoms of infection or are not feeling well for any reason.    (Wash your hands often, stay away from people who are sick)  - If you have side effects from your medication that do not go away or are bothersome.     (Refer to the teaching your nurse gave you for side effects to call your doctor about)    - Common side effects may include:  stuffy nose, headache, feeling tired, muscle aches, upset stomach  - Before receiving any vaccines     - Call the [facility] at   If:  - You get sick, are on antibiotics, have had a recent vaccine, have surgery or dental work and your doctor wants your visit rescheduled.  - You need to cancel and reschedule your visit for any reason. Call at least 2 days before your visit if you need to cancel.   - Your insurance changes before your next visit.    (We will need to get approval from your new insurance. This can take up to two weeks.)     The [facility] is opened Monday thru Friday. We are closed on weekends and holidays.   Voice mail will take your  call if the center is closed. If you leave a message please allow 24 hours for a call back during weekdays. If you leave a message on a weekend/holiday, we will call you back the next business day.    A pharmacist is available Monday - Friday from 8:30AM to 3:30PM to help answer any questions you may have about your prescriptions(s). Please call pharmacy at:    [facility]: [phone]  [facility]: [phone]  [facility]: [phone]

## 2025-01-14 ENCOUNTER — APPOINTMENT (OUTPATIENT)
Dept: DERMATOLOGY | Facility: CLINIC | Age: 75
End: 2025-01-14
Payer: MEDICARE

## 2025-01-28 ENCOUNTER — APPOINTMENT (OUTPATIENT)
Dept: DERMATOLOGY | Facility: CLINIC | Age: 75
End: 2025-01-28
Payer: MEDICARE

## 2025-01-28 DIAGNOSIS — L98.6 OTHER INFILTRATIVE DISORDERS OF THE SKIN AND SUBCUTANEOUS TISSUE: Primary | ICD-10-CM

## 2025-01-28 DIAGNOSIS — Z79.899 OTHER LONG TERM (CURRENT) DRUG THERAPY: ICD-10-CM

## 2025-01-28 DIAGNOSIS — Z86.2 HISTORY OF AUTOIMMUNE DISEASE: ICD-10-CM

## 2025-01-28 RX ORDER — PREDNISONE 2.5 MG/1
TABLET ORAL
Qty: 62 TABLET | Refills: 0 | Status: SHIPPED | OUTPATIENT
Start: 2025-01-28

## 2025-01-28 ASSESSMENT — ITCH NUMERIC RATING SCALE: HOW SEVERE IS YOUR ITCHING?: 1

## 2025-01-28 NOTE — PROGRESS NOTES
Subjective     Jalen Laboy is a 74 y.o. male who presents for the following: Rash (Follow up( last seen in 6/24) Prednisone 5 mg daily).     Mr. Laboy presents today for follow up of a granulomatous dermatitis. He first presented in April of 2024 to Dr. Park. Summarizing his history to this point, patient has an ongoing dermatitis present since July of 2023 which initially started following intense sun exposure. He had an outside biopsy at [facility] suggestive of interstitial granulomatous dermatitis. Patient underwent a 3 month discontinuation of Atorvastatin without resolution of his dermatitis.    Patient was seen by Dr. Park in April of 2024. At his visit he had workup for possible driving causes. His NESSA and Anti-RNP are elevated. He was started on Hydroxychloroquine 200mg BID. 2x biopsies at this time showed superficial lymphohistiocytic infiltrate, favoring granuloma annulare, with interstitial granulomatous dermatitis being less likely.    He also saw Rheumatology (Dr. Alba) in May of 2024 for evaluation of arthritis. He was started on a prednisone taper, 20mg Saw improvement but not clearance of his rash with each course of prednisone. Last appointment with rheumatology in November 2024, where he was tapered off of prednisone and methotrexate 12.5 mg weekly.    Today, he states he's doing very well. His rash has greatly improved over the last several months and now he only feels itchy in one area of his lower back. Notably does not moisturize that area. Has gone on multiple drug vacations since his last visit with us in June 2024. Discontinued amlodipine for 3 months, followed by carvedilol, then losartan - all without notable improvement. Has restarted all again. He is inquiring about a new prescription for prednisone as he is currently on the tail end of taper prescribed by his Rheumatologist, currently taking 5 mg daily    He denies any fevers, chills, night sweats, fatigue, decreased  appetite, joint pain, joint swelling, joint stiffness.     Review of Systems:  No other skin or systemic complaints other than what is documented elsewhere in the note.    The following portions of the chart were reviewed this encounter and updated as appropriate:          Skin Cancer History  No skin cancer on file.      Specialty Problems          Dermatology Problems    Benign neoplasm of skin     Formatting of this note might be different from the original. NEVI Formatting of this note might be different from the original. Overview: NEVI         Epidermal cyst    Other hypertrophic disorders of the skin    Other seborrheic keratosis    Lesion of skin of scalp    Interstitial granulomatous dermatitis        Objective   Well appearing patient in no apparent distress; mood and affect are within normal limits.    A focused skin examination was performed. All findings within normal limits unless otherwise noted below.    Assessment/Plan   1. Other infiltrative disorders of the skin and subcutaneous tissue  In a generalized distribution there are sites of PIH, few deeply erythematous papules with associated excoriations visible superior to gluteal cleft              - Initial onset of rash in July 2023, biopsies at the time showed granulomatous dermatitis, repeat biopsies in April 2024 show superficial lymphohistiocytic infiltrate   - Several trials of drug holidays over last 1.5 years without significant improvement: statins, amlodipine, carvedilol, and losartan  - Currently following with rheumatology (Dr. Alba). They are favoring an autoimmune diagnosis at this time, possibly SLE. They are managing his methotrexate 12.5 mg weekly, hydroxychloroquine 200 mg BID, and prednisone  - Has seen significant improvement in his rash over last few months, may coincide with when he started methotrexate  - Continue methotrexate and hydroxychloroquine at rheumatology's discretion  - Has had numerous prednisone tapers over last  year and reports that he feels his best while he is taking it  - Discussed with patient that chronic granulomatous dermatitis oftentimes has a long time frame for treatment and that prednisone is not a good solution long term, he indicated understanding of this  - He is currently taking 5 mg and on the tail end of a taper prescribed by rheumatology  - We will attempt to taper him off prednisone over the course of 6 weeks, until he is scheduled to see rheumatology again in March  - Will plan for taking 5 mg (2 pills) on day one, followed by 2.5 mg (1 pill) on the second day, he will alternate this combination for 14 days. Then, take 2.5 mg (1 pill) daily for 14 days. Then, take 2.5 mg (1 pill) every other day for 14 days  - Standing orders placed for drug monitoring labs and inflammatory markers: CBC, CMP, ESR, CRP (did not see any active orders for these)  - RTC in 6 months for follow up or sooner if rash worsens significantly    Related Procedures  Sedimentation rate, automated  CBC and Auto Differential  Comprehensive metabolic panel  C-Reactive Protein  Follow Up In Dermatology - Established Patient    Related Medications  predniSONE (Deltasone) 2.5 mg tablet  Start taking 5 mg (2 pills) on day one, followed by 2.5 mg (1 pill) on the second day, you will alternate this combination for 14 days. Then, take 2.5 mg (1 pill) daily for 14 days. Then, for the next 14 days, take 2.5 mg (1 pill) every other day for 14 days    2. Other long term (current) drug therapy    Related Procedures  Sedimentation rate, automated  CBC and Auto Differential  Comprehensive metabolic panel  C-Reactive Protein    3. History of autoimmune disease    Related Procedures  Sedimentation rate, automated  CBC and Auto Differential  Comprehensive metabolic panel  C-Reactive Protein    Related Medications  predniSONE (Deltasone) 2.5 mg tablet  Start taking 5 mg (2 pills) on day one, followed by 2.5 mg (1 pill) on the second day, you will  alternate this combination for 14 days. Then, take 2.5 mg (1 pill) daily for 14 days. Then, for the next 14 days, take 2.5 mg (1 pill) every other day for 14 days      RTC in 6 months for follow up or sooner if needed    Electronically signed by Keyana Calderon MD on 1/28/2025 at 6:31 PM       I saw and evaluated the patient. I personally obtained the key and critical portions of the history and physical exam or was physically present for key and critical portions performed by the resident/fellow. I reviewed the resident/fellow's documentation and discussed the patient with the resident/fellow. I agree with the resident/fellow's medical decision making as documented in the note and made changes where appropriate.

## 2025-01-28 NOTE — PATIENT INSTRUCTIONS
Start taking 5 mg (2 pills) on day one, followed by 2.5 mg (1 pill) on the second day, you will alternate this combination for 14 days. Then, take 2.5 mg (1 pill) daily for 14 days. Then, for the next 14 days, take 2.5 mg (1 pill) every other day for 14 days

## 2025-02-05 ENCOUNTER — OFFICE VISIT (OUTPATIENT)
Dept: URGENT CARE | Age: 75
End: 2025-02-05
Payer: MEDICARE

## 2025-02-05 VITALS
TEMPERATURE: 97.5 F | DIASTOLIC BLOOD PRESSURE: 65 MMHG | HEIGHT: 70 IN | BODY MASS INDEX: 28.49 KG/M2 | HEART RATE: 60 BPM | SYSTOLIC BLOOD PRESSURE: 112 MMHG | OXYGEN SATURATION: 97 % | RESPIRATION RATE: 19 BRPM | WEIGHT: 199 LBS

## 2025-02-05 DIAGNOSIS — H61.23 BILATERAL IMPACTED CERUMEN: Primary | ICD-10-CM

## 2025-02-05 RX ORDER — NEOMYCIN SULFATE, POLYMYXIN B SULFATE AND HYDROCORTISONE 10; 3.5; 1 MG/ML; MG/ML; [USP'U]/ML
SUSPENSION/ DROPS AURICULAR (OTIC)
Qty: 10 ML | Refills: 0 | Status: SHIPPED | OUTPATIENT
Start: 2025-02-05

## 2025-02-05 ASSESSMENT — ENCOUNTER SYMPTOMS
EYES NEGATIVE: 1
CONSTITUTIONAL NEGATIVE: 1

## 2025-02-05 NOTE — PROGRESS NOTES
"Subjective   Patient ID: Jalen Laboy is a 74 y.o. male. They present today with a chief complaint of Earache (Lt ear blocked).    History of Present Illness    Earache   Associated symptoms include hearing loss. Pertinent negatives include no ear discharge.       Past Medical History  Allergies as of 02/05/2025 - Reviewed 02/05/2025   Allergen Reaction Noted    Penicillins Hives and Unknown 08/02/2007       (Not in a hospital admission)       Past Medical History:   Diagnosis Date    Heart disease     Personal history of other benign neoplasm     History of benign neoplasm of skin    Unspecified convulsions (Multi)     Single seizure       Past Surgical History:   Procedure Laterality Date    CARDIAC SURGERY      CT ANGIO CORONARY ART WITH HEARTFLOW IF SCORE >30%  04/19/2022    [place] HEART CORONARY ANGIOGRAM [place] [facility] [place] [facility]        reports that he has quit smoking. His smoking use included cigarettes. He has never used smokeless tobacco. He reports that he does not currently use alcohol. He reports that he does not use drugs.    Review of Systems  Review of Systems   Constitutional: Negative.    HENT:  Positive for hearing loss. Negative for ear discharge and ear pain.    Eyes: Negative.                                   Objective    Vitals:    02/05/25 0912   BP: 112/65   Pulse: 60   Resp: 19   Temp: 36.4 °C (97.5 °F)   TempSrc: Oral   SpO2: 97%   Weight: 90.3 kg (199 lb)   Height: 1.778 m (5' 10\")     No LMP for male patient.    Physical Exam  Constitutional:       Appearance: Normal appearance.   HENT:      Right Ear: There is impacted cerumen.      Left Ear: There is impacted cerumen.   Lymphadenopathy:      Cervical: No cervical adenopathy.   Neurological:      Mental Status: He is alert.         Ear Cerumen Removal    Date/Time: 2/5/2025 10:48 AM    Performed by: Teo Allen MA  Authorized by: Jazmine Lee MD    Consent:     Consent obtained:  Verbal    Consent given by:  Patient    Risks " discussed:  Incomplete removal and pain    Alternatives discussed:  No treatment  Universal protocol:     Patient identity confirmed:  Verbally with patient  Procedure details:     Location:  R ear and L ear    Procedure type: irrigation      Procedure outcomes: unable to remove cerumen    Post-procedure details:     Inspection:  Some cerumen remaining and no bleeding    Hearing quality:  Normal    Procedure completion:  Tolerated well, no immediate complications      Point of Care Test & Imaging Results from this visit  No results found for this visit on 02/05/25.   No results found.    Diagnostic study results (if any) were reviewed by Jazmine Lee MD.    Assessment/Plan   Allergies, medications, history, and pertinent labs/EKGs/Imaging reviewed by Jazmine Lee MD.     Medical Decision Making      Orders and Diagnoses  There are no diagnoses linked to this encounter.    Medical Admin Record      Patient disposition: Home    Electronically signed by Jazmine Lee MD  9:40 AM

## 2025-02-06 DIAGNOSIS — Z95.1 S/P CABG X 4: ICD-10-CM

## 2025-02-06 DIAGNOSIS — I10 BENIGN ESSENTIAL HTN: ICD-10-CM

## 2025-02-06 DIAGNOSIS — I10 ESSENTIAL HYPERTENSION: ICD-10-CM

## 2025-02-10 ENCOUNTER — APPOINTMENT (OUTPATIENT)
Dept: ENDOCRINOLOGY | Facility: CLINIC | Age: 75
End: 2025-02-10
Payer: MEDICARE

## 2025-02-10 VITALS
SYSTOLIC BLOOD PRESSURE: 123 MMHG | WEIGHT: 205 LBS | HEIGHT: 70 IN | TEMPERATURE: 97.7 F | DIASTOLIC BLOOD PRESSURE: 66 MMHG | BODY MASS INDEX: 29.35 KG/M2 | HEART RATE: 63 BPM

## 2025-02-10 DIAGNOSIS — R73.09 OTHER ABNORMAL GLUCOSE: ICD-10-CM

## 2025-02-10 DIAGNOSIS — E06.3 HYPOTHYROIDISM DUE TO HASHIMOTO'S THYROIDITIS: Primary | ICD-10-CM

## 2025-02-10 DIAGNOSIS — E78.5 HYPERLIPIDEMIA, UNSPECIFIED HYPERLIPIDEMIA TYPE: ICD-10-CM

## 2025-02-10 DIAGNOSIS — I25.119 CORONARY ARTERY DISEASE INVOLVING NATIVE HEART WITH ANGINA PECTORIS, UNSPECIFIED VESSEL OR LESION TYPE: ICD-10-CM

## 2025-02-10 ASSESSMENT — ENCOUNTER SYMPTOMS
LOSS OF SENSATION IN FEET: 0
OCCASIONAL FEELINGS OF UNSTEADINESS: 0
DEPRESSION: 0

## 2025-02-10 NOTE — PATIENT INSTRUCTIONS
Continue levothyroxine 137 mcg once daily except 1/2 on Sundays.      Repeat labs      Follow up 6-12 months

## 2025-02-10 NOTE — PROGRESS NOTES
"Subjective   Jalen Laboy is a 74 y.o. male presents today for a follow up visit  regarding hypothyroidism.  The patient was initially diagnosed around 1/2023. Denies family history of thyroid disease or autoimmune disease.     Patient of Dr. Estrella and referred to pedro luis for elevated TSH   Follows with Dr. Estrella for CAD   on 1/19/2023  Reports brittle hair and nails with 10 lb weight gain at diagnosis   Had OHS in May 2022 and had some fatigue after surgery - completed cardiac rehab.  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   Rash has improved  He is taking prednisone and doing a slow wean   (+) TPO 4/2023  The patient is taking levothyroxine 137 mcg once daily except 1/2 on Sundays    Overall feeling better  Had more energy   Sleeping better         ROS  Thyroid pain: no  Mass effect: denies difficulty swallowing, change in voice, difficulty breathing, pressure at neck   Energy: improved  Sleep: normal   Cardiovascular:  denies heart palpitations or chest pain  GI: denies loose stools, frequent stools or constipation  Weight: stable   Eyes: denies dryness, occasional blurred vision   Diaphoresis: denies diaphoresis  Skin: (+) rash to trunk and legs.  no changes to hair, nails   Neuro: negative headaches, seizures, tremors    Objective    Physical Exam  Blood pressure 123/66, pulse 63, temperature 36.5 °C (97.7 °F), temperature source Temporal, height 1.778 m (5' 10\"), weight 93 kg (205 lb).  General: not in acute distress, cooperative  Respiratory: normal respiratory effort  Musculoskeletal: normal gait        Current Outpatient Medications:     acetaminophen (Tylenol) 325 mg tablet, Take by mouth every 6 hours. (Patient not taking: Reported on 1/28/2025), Disp: , Rfl:     amLODIPine (Norvasc) 10 mg tablet, Take 1 tablet (10 mg) by mouth once daily., Disp: , Rfl:     aspirin 81 mg EC tablet, Take 1 tablet (81 mg) by mouth once daily., " Disp: , Rfl:     azithromycin (Zithromax) 250 mg tablet, Take 1 tablet (250 mg) by mouth once daily., Disp: , Rfl:     carvedilol (Coreg) 25 mg tablet, Take 1 tablet (25 mg) by mouth 2 times a day., Disp: 180 tablet, Rfl: 3    fish oil concentrate (Omega-3) 120-180 mg capsule, Take 1 capsule (1 g) by mouth once daily., Disp: , Rfl:     folic acid (Folvite) 1 mg tablet, Take 1 tablet (1 mg) by mouth once daily., Disp: 30 tablet, Rfl: 11    hydroCHLOROthiazide (HYDRODiuril) 25 mg tablet, Take 1 tablet (25 mg) by mouth once daily., Disp: 30 tablet, Rfl: 11    hydroxychloroquine (Plaquenil) 200 mg tablet, Take 1 tablet (200 mg) by mouth 2 times a day., Disp: 180 tablet, Rfl: 3    inclisiran (Leqvio) injection, Inject 1.5 mL (284 mg total) under the skin every 3 months.  at 0, 3 months and then every 6 months thereafter, Disp: 1 each, Rfl: 2    levothyroxine (Synthroid, Levoxyl) 137 mcg tablet, Take 1 tablet once daily except 1/2 on Sunday, Disp: 90 tablet, Rfl: 3    losartan (Cozaar) 100 mg tablet, TAKE 1 TABLET DAILY IN THE EVENING (DOSE INCREASED, STOP ISOSORBIDE), Disp: 90 tablet, Rfl: 3    methotrexate (Trexall) 2.5 mg tablet, Take 5 tablets (12.5 mg total) by mouth 1 (one) time per week.  Follow directions carefully, and ask to explain any part you do not understand. Take exactly as directed., Disp: 30 tablet, Rfl: 2    multivitamin with minerals iron-free (Centrum Silver), Take 1 tablet by mouth once daily., Disp: , Rfl:     neomycin-polymyxin-HC (Cortisporin) 3.5-10,000-1 mg/mL-unit/mL-% otic suspension, 4 drops in the affected ear 3 times a day x 10 days, Disp: 10 mL, Rfl: 0    predniSONE (Deltasone) 2.5 mg tablet, Start taking 5 mg (2 pills) on day one, followed by 2.5 mg (1 pill) on the second day, you will alternate this combination for 14 days. Then, take 2.5 mg (1 pill) daily for 14 days. Then, for the next 14 days, take 2.5 mg (1 pill) every other day for 14 days, Disp: 62 tablet, Rfl: 0    predniSONE  (Deltasone) 5 mg tablet, Take 1 tablet (5 mg) by mouth once daily., Disp: 120 tablet, Rfl: 0          Assessment/Plan   Hypothyroidism due to Hashimoto's:   -TSH previously low.  Dose was adjusted and needs updated labs.  Overall feeling well and actually reports improved energy.        Plan:   Continue levothyroxine 137 mcg once daily except 1/2 on Sundays.    Repeat labs    Follow up 12 months      CAD   Hyperlipidemia  Abnormal glucose:   - s/p CAD.  Questioned statin intolerance and now on Levqio injections.  Appears to be tolerating well.  Follows with cardiology.  On long term prednisone and will check A1c     Plan:   Get labs

## 2025-02-12 ENCOUNTER — APPOINTMENT (OUTPATIENT)
Dept: DERMATOLOGY | Facility: CLINIC | Age: 75
End: 2025-02-12
Payer: MEDICARE

## 2025-02-13 ENCOUNTER — LAB (OUTPATIENT)
Dept: LAB | Facility: HOSPITAL | Age: 75
End: 2025-02-13
Payer: MEDICARE

## 2025-02-13 DIAGNOSIS — E78.5 DYSLIPIDEMIA: ICD-10-CM

## 2025-02-13 LAB
CHOLEST SERPL-MCNC: 102 MG/DL (ref 0–199)
CHOLESTEROL/HDL RATIO: 3.2
HDLC SERPL-MCNC: 31.5 MG/DL
LDLC SERPL CALC-MCNC: 55 MG/DL
NON HDL CHOLESTEROL: 71 MG/DL (ref 0–149)
TRIGL SERPL-MCNC: 80 MG/DL (ref 0–149)
VLDL: 16 MG/DL (ref 0–40)

## 2025-02-14 LAB
ALBUMIN SERPL-MCNC: 4.3 G/DL (ref 3.6–5.1)
ALP SERPL-CCNC: 61 U/L (ref 35–144)
ALT SERPL-CCNC: 21 U/L (ref 9–46)
ANION GAP SERPL CALCULATED.4IONS-SCNC: 7 MMOL/L (CALC) (ref 7–17)
AST SERPL-CCNC: 19 U/L (ref 10–35)
BASOPHILS # BLD AUTO: 12 CELLS/UL (ref 0–200)
BASOPHILS NFR BLD AUTO: 0.3 %
BILIRUB SERPL-MCNC: 1.2 MG/DL (ref 0.2–1.2)
BUN SERPL-MCNC: 12 MG/DL (ref 7–25)
CALCIUM SERPL-MCNC: 10 MG/DL (ref 8.6–10.3)
CHLORIDE SERPL-SCNC: 99 MMOL/L (ref 98–110)
CO2 SERPL-SCNC: 34 MMOL/L (ref 20–32)
CREAT SERPL-MCNC: 0.71 MG/DL (ref 0.7–1.28)
CRP SERPL-MCNC: 8.9 MG/L
EGFRCR SERPLBLD CKD-EPI 2021: 96 ML/MIN/1.73M2
EOSINOPHIL # BLD AUTO: 12 CELLS/UL (ref 15–500)
EOSINOPHIL NFR BLD AUTO: 0.3 %
ERYTHROCYTE [DISTWIDTH] IN BLOOD BY AUTOMATED COUNT: 13.3 % (ref 11–15)
ERYTHROCYTE [SEDIMENTATION RATE] IN BLOOD BY WESTERGREN METHOD: 14 MM/H
EST. AVERAGE GLUCOSE BLD GHB EST-MCNC: 111 MG/DL
EST. AVERAGE GLUCOSE BLD GHB EST-SCNC: 6.2 MMOL/L
GLUCOSE SERPL-MCNC: 84 MG/DL (ref 65–99)
HBA1C MFR BLD: 5.5 % OF TOTAL HGB
HCT VFR BLD AUTO: 45.8 % (ref 38.5–50)
HGB BLD-MCNC: 15.5 G/DL (ref 13.2–17.1)
LYMPHOCYTES # BLD AUTO: 1279 CELLS/UL (ref 850–3900)
LYMPHOCYTES NFR BLD AUTO: 32.8 %
MCH RBC QN AUTO: 28.5 PG (ref 27–33)
MCHC RBC AUTO-ENTMCNC: 33.8 G/DL (ref 32–36)
MCV RBC AUTO: 84.2 FL (ref 80–100)
MONOCYTES # BLD AUTO: 827 CELLS/UL (ref 200–950)
MONOCYTES NFR BLD AUTO: 21.2 %
NEUTROPHILS # BLD AUTO: 1771 CELLS/UL (ref 1500–7800)
NEUTROPHILS NFR BLD AUTO: 45.4 %
PLATELET # BLD AUTO: 182 THOUSAND/UL (ref 140–400)
PMV BLD REES-ECKER: 11.1 FL (ref 7.5–12.5)
POTASSIUM SERPL-SCNC: 4.2 MMOL/L (ref 3.5–5.3)
PROT SERPL-MCNC: 6.7 G/DL (ref 6.1–8.1)
RBC # BLD AUTO: 5.44 MILLION/UL (ref 4.2–5.8)
SODIUM SERPL-SCNC: 140 MMOL/L (ref 135–146)
T4 FREE SERPL-MCNC: 1.8 NG/DL (ref 0.8–1.8)
TSH SERPL-ACNC: 0.21 MIU/L (ref 0.4–4.5)
WBC # BLD AUTO: 3.9 THOUSAND/UL (ref 3.8–10.8)

## 2025-02-15 NOTE — PROGRESS NOTES
Subjective   Patient ID: Jalen Laboy is a 74 y.o. male who presents for No chief complaint on file..  HPI  This 74-year-old male is being seen in the office today for an evaluation centering on ear pain and obstruction especially on the left side.  He was seen in the urgent care center in the early part of February of this year for these issues.  According to the records reviewed there was no signs of otorrhea or of infection.  Impacted cerumen was noted bilaterally and only partial removal of the cerumen was able to be done.  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  According to the endocrinology note the patient had been on prednisone and was undergoing a slow wean.  His past medical history is also noted to consist of coronary artery disease, hyperal lipidemia he has been followed by dermatology due to development of a rash felt secondary to interstitial granulomatous dermatitis.  The rash improved on prednisone tract site and  Review of Systems   A 12 point ROS  has been reviewed and are negative for complaint except for what is stated in the history of present illness and /or for past medical history as noted in the EMR.    Past Medical History:   Diagnosis Date    Heart disease     Personal history of other benign neoplasm     History of benign neoplasm of skin    Unspecified convulsions (Multi)     Single seizure          Current Outpatient Medications:     amLODIPine (Norvasc) 10 mg tablet, Take 1 tablet (10 mg) by mouth once daily., Disp: , Rfl:     aspirin 81 mg EC tablet, Take 1 tablet (81 mg) by mouth once daily., Disp: , Rfl:     azithromycin (Zithromax) 250 mg tablet, Take 1 tablet (250 mg) by mouth once daily., Disp: , Rfl:     carvedilol (Coreg) 25 mg tablet, Take 1 tablet (25 mg) by mouth 2 times a day., Disp: 180 tablet, Rfl: 3    fish oil concentrate (Omega-3) 120-180 mg capsule, Take 1 capsule (1  g) by mouth once daily., Disp: , Rfl:     folic acid (Folvite) 1 mg tablet, Take 1 tablet (1 mg) by mouth once daily., Disp: 30 tablet, Rfl: 11    hydroCHLOROthiazide (HYDRODiuril) 25 mg tablet, Take 1 tablet (25 mg) by mouth once daily., Disp: 30 tablet, Rfl: 11    hydroxychloroquine (Plaquenil) 200 mg tablet, Take 1 tablet (200 mg) by mouth 2 times a day., Disp: 180 tablet, Rfl: 3    inclisiran (Leqvio) injection, Inject 1.5 mL (284 mg total) under the skin every 3 months.  at 0, 3 months and then every 6 months thereafter, Disp: 1 each, Rfl: 2    levothyroxine (Synthroid, Levoxyl) 137 mcg tablet, Take 1 tablet once daily except 1/2 on Sunday, Disp: 90 tablet, Rfl: 3    losartan (Cozaar) 100 mg tablet, TAKE 1 TABLET DAILY IN THE EVENING (DOSE INCREASED, STOP ISOSORBIDE), Disp: 90 tablet, Rfl: 3    methotrexate (Trexall) 2.5 mg tablet, Take 5 tablets (12.5 mg total) by mouth 1 (one) time per week.  Follow directions carefully, and ask to explain any part you do not understand. Take exactly as directed., Disp: 30 tablet, Rfl: 2    multivitamin with minerals iron-free (Centrum Silver), Take 1 tablet by mouth once daily., Disp: , Rfl:     neomycin-polymyxin-HC (Cortisporin) 3.5-10,000-1 mg/mL-unit/mL-% otic suspension, 4 drops in the affected ear 3 times a day x 10 days, Disp: 10 mL, Rfl: 0    predniSONE (Deltasone) 2.5 mg tablet, Start taking 5 mg (2 pills) on day one, followed by 2.5 mg (1 pill) on the second day, you will alternate this combination for 14 days. Then, take 2.5 mg (1 pill) daily for 14 days. Then, for the next 14 days, take 2.5 mg (1 pill) every other day for 14 days, Disp: 62 tablet, Rfl: 0    predniSONE (Deltasone) 5 mg tablet, Take 1 tablet (5 mg) by mouth once daily., Disp: 120 tablet, Rfl: 0     Past Surgical History:   Procedure Laterality Date    CARDIAC SURGERY      CT ANGIO CORONARY ART WITH HEARTFLOW IF SCORE >30%  04/19/2022    [place] HEART CORONARY [place]IO[place] [facility] [place] [facility] [place]         Family History   Problem Relation Name Age of Onset    Hypertension Mother Latosha        Social History     Tobacco Use    Smoking status: Former     Types: Cigarettes     Passive exposure: Past    Smokeless tobacco: Never   Substance Use Topics    Alcohol use: Not Currently       Allergies   Allergen Reactions    Penicillins Hives and Unknown       There were no vitals taken for this visit.    Objective   Physical Exam  Examination:    CONSTITUTIONAL: Alert, in no acute distress, normal pitch/clarity of voice, well-developed, well-nourished, cooperative.  HEAD/FACE: Normocephalic, atraumatic, no tenderness over the sinuses, facial strength and movement symmetric.    SKIN: Good turgor, no rashes, no suspicious lesions, in the head and neck.    EYES: Both eyes have normal extraocular movements with no nystagmus, pupils are equal and reactive to light and accommodation, conjunctiva is clear.    EARS: Both ears are negative for external skin abnormalities, external auditory canals are without lesions or signs of inflammation, tympanic membranes are intact and are of normal color and texture, no effusions are seen, light reflexes normal, no mastoid tenderness is noted to palpation, objective hearing is intact.    NOSE: No external skin lesions are noted, nares are patent, septum is intact and noninflamed, nasal turbinates are normal in appearance, sinuses are nontender to palpation bilaterally, no internal lesions or polyps are noted, no discharge is noted.    OROPHARYNX/ORAL CAVITY: Mucous membranes of the oropharynx and the oral cavity proper are without lesions or ulcerations, tongue mobility is normal and no lesions are noted, gingiva and alveolar mucosa is intact without lesions, oral mucosa is moist, muscular movement of the palate and gag reflex are normal.    NASOPHARYNX: Mucous membranes are noninflamed and no secretions or lesions are noted.    LARYNX: No mucosal inflammation or exudates are noted,  arytenoids are normal in appearance and mobility, false vocal cords are without lesions as is the remainder of the supraglottic larynx, true vocal folds are mobile without inflammation or obstructions and no masses or lesions are noted in the endolarynx.    NECK: No lymphadenopathy is palpated, neck is supple with full range of motion, thyroid is without swelling or tenderness, trachea is midline, no neck masses are noted.    Lymphatics: No cervical adenopathy or supraclavicular adenopathy noted to palpation.    HEART/VASCULAR: No jugular venous distention is noted, carotid pulsations are intact with a regular rate and rhythm noted,    PULMONARY: Good air movement with normal inspiratory/expiratory effort is noted, no audible wheezing is appreciated.    NEUROLOGIC: Alert and oriented, cranial nerves are grossly intact, gait is normal, sensation in the head and neck is intact,    PSYCH: oriented to person, place and time, normal mood and affect.    EXTREMITIES: No motor dysfunction of the upper and lower extremity is noted.    Patient ID: Jalen Laboy is a 74 y.o. male.    Procedures  Assessment/Plan   {Assess/PlanSmartLinks:92353}         Dallas King DMD, MD 02/15/25 11:50 AM    signs of acute inflammation.      His audiogram today on the right ear revealed evidence for a mild sensorineural hearing loss up through 2000 Hz moderate in the remaining frequencies.  On the left-hand side there was a mixed hearing loss moderate to severe with the sensorineural component similar to the right ear.  Tympanograms were normal bilaterally.  Speech audiometry revealed 100% squamation ability on the right at 70 dB 60% on the left at 80 dB.  There is been progression of hearing loss on the left side since 2023.  Assessment/Plan   Problem List Items Addressed This Visit    None  Visit Diagnoses         Codes    Impacted cerumen of left ear    -  Primary H61.22    Sensorineural hearing loss (SNHL) of right ear with restricted hearing of left ear     H90.A21    Mixed conductive and sensorineural hearing loss of left ear with restricted hearing of right ear     H90.A32    Auditory discrimination impairment, left     H93.292    History of coronary artery disease     Z86.79          I discussed the clinical finds with the patient.  I made him aware the fact that the conductive loss on his left ear is not explainable on the basis of observed middle ear fluid or from cerumen buildup.  The sensorineural loss on the left ear seems comparable to that on the right although with the added conductive component hearing is more significantly impaired and discrimination is reduced.  He does have to have this repeated to see if this is a temporary finding and as such I did recommend a follow-up in 3 months with an audiogram.  In reference to the cerumen he can use Debrox every 4 to 6 weeks to help cleanse the ear canal and 1 to 2 days a week he could use a drop of baby oil or sweet oil in the ear canals.  He should keep water out of his years and avoid Q-tip use.  I did make him aware the fact that based on his physical exam the conductive loss on the left side is not able to be explained.  There was no obvious middle ear  fluid and none was suspected on the basis of his tympanogram.  Will need to recheck this to see if this is a persisting hearing pattern and if so then an MRI scan would be advised.       Dallas King DMD, MD 02/19/25 11:33 AM

## 2025-02-16 NOTE — RESULT ENCOUNTER NOTE
LDL is at target on current medical regimen. NO change in lipid lowering regimen needed at this time. Will discuss more at upcoming appointment

## 2025-02-17 DIAGNOSIS — E06.3 HYPOTHYROIDISM DUE TO HASHIMOTO'S THYROIDITIS: ICD-10-CM

## 2025-02-17 RX ORDER — LEVOTHYROXINE SODIUM 137 UG/1
TABLET ORAL
COMMUNITY
Start: 2025-02-17

## 2025-02-19 ENCOUNTER — APPOINTMENT (OUTPATIENT)
Dept: AUDIOLOGY | Facility: CLINIC | Age: 75
End: 2025-02-19
Payer: MEDICARE

## 2025-02-19 ENCOUNTER — APPOINTMENT (OUTPATIENT)
Dept: OTOLARYNGOLOGY | Facility: CLINIC | Age: 75
End: 2025-02-19
Payer: MEDICARE

## 2025-02-19 DIAGNOSIS — H93.292 AUDITORY DISCRIMINATION IMPAIRMENT, LEFT: ICD-10-CM

## 2025-02-19 DIAGNOSIS — H90.3 BILATERAL SENSORINEURAL HEARING LOSS: Primary | ICD-10-CM

## 2025-02-19 DIAGNOSIS — H61.23 BILATERAL IMPACTED CERUMEN: ICD-10-CM

## 2025-02-19 DIAGNOSIS — H90.A21 SENSORINEURAL HEARING LOSS (SNHL) OF RIGHT EAR WITH RESTRICTED HEARING OF LEFT EAR: ICD-10-CM

## 2025-02-19 DIAGNOSIS — Z86.79 HISTORY OF CORONARY ARTERY DISEASE: ICD-10-CM

## 2025-02-19 DIAGNOSIS — H90.A32 MIXED CONDUCTIVE AND SENSORINEURAL HEARING LOSS OF LEFT EAR WITH RESTRICTED HEARING OF RIGHT EAR: Primary | ICD-10-CM

## 2025-02-19 DIAGNOSIS — H90.A32 MIXED CONDUCTIVE AND SENSORINEURAL HEARING LOSS OF LEFT EAR WITH RESTRICTED HEARING OF RIGHT EAR: ICD-10-CM

## 2025-02-19 DIAGNOSIS — H61.22 IMPACTED CERUMEN OF LEFT EAR: Primary | ICD-10-CM

## 2025-02-19 ASSESSMENT — PATIENT HEALTH QUESTIONNAIRE - PHQ9
1. LITTLE INTEREST OR PLEASURE IN DOING THINGS: NOT AT ALL
2. FEELING DOWN, DEPRESSED OR HOPELESS: NOT AT ALL
SUM OF ALL RESPONSES TO PHQ9 QUESTIONS 1 AND 2: 0

## 2025-02-19 ASSESSMENT — PAIN SCALES - GENERAL: PAINLEVEL_OUTOF10: 0-NO PAIN

## 2025-02-19 ASSESSMENT — COLUMBIA-SUICIDE SEVERITY RATING SCALE - C-SSRS: 1. IN THE PAST MONTH, HAVE YOU WISHED YOU WERE DEAD OR WISHED YOU COULD GO TO SLEEP AND NOT WAKE UP?: NO

## 2025-02-19 NOTE — PROGRESS NOTES
"AUDIOMETRIC EVALUATION       Name:  Jalen Laboy  :  1950  Age:  74 y.o.  Date of Evaluation:  2025     HISTORY  Jalen Laboy was seen today for a hearing evaluation due to left ear blocked, told he had cerumen in both ears. Consulted Dr. King for a cleaning today prior to testing. Has a known hearing loss per  hearing evaluation.    PROCEDURE:  Otoscopic Evaluation:    RIGHT: Clear ear canal and tympanic membrane visualized.  LEFT:  Clear ear canal and tympanic membrane visualized.    Immittance: Tympanometry (226 Hz probe tone) and Stapedial Acoustic Reflexes Thresholds (ART)(Probe ear):  RIGHT: Normal middle ear pressure, mobility, and ear canal volume. Ipsilateral ART -2000 Hz.  LEFT: Normal middle ear pressure, mobility, and ear canal volume. Ipsilateral ART -2000 Hz.    Pure Tone and Speech Audiometry:    Test Technique: Pure Tone Audiometry via insert earphones  Test Reliability: good    RIGHT: Mild hearing loss through 2000 Hz sloping to moderate  sensorineural hearing loss through 8000 Hz. Word Recognition score was excellent using recorded material (NU-6 10-word list ordered by difficulty).   LEFT: Mild hearing loss through 500 Hz sloping to severe  mixed hearing loss through 8000 Hz. Word Recognition score was fair using recorded material (NU-6 25-word list).     EVALUATION  See scanned Audiogram in \"Media\".    IMPRESSIONS:  Today's test results indicate decrease in hearing in the left ear with stable hearing in the right ear.    RECOMMENDATIONS:  Continue medical follow-up with physician.  Return for audiologic assessment in conjunction with otologic care or annually.   Amplification options briefly discussed and literature provided re: hearing aids. Consider amplification pending medical clearance. Check insurance benefit for hearing aid benefits and in-network providers. To schedule an appointment for a hearing aid consultation call [phone].  Implement " communication strategies (utilizing visual cues/gestures; reducing background noise and distance from desired source; increasing light to assist with visual cues; use of clear speech).     MARTIN Rosario, CCC-A  Senior Clinical Audiologist    TIME: [id number]

## 2025-02-21 RX ORDER — LOSARTAN POTASSIUM 100 MG/1
TABLET ORAL
Qty: 90 TABLET | Refills: 3 | Status: SHIPPED | OUTPATIENT
Start: 2025-02-21

## 2025-02-21 RX ORDER — CARVEDILOL 25 MG/1
25 TABLET ORAL 2 TIMES DAILY
Qty: 180 TABLET | Refills: 3 | Status: SHIPPED | OUTPATIENT
Start: 2025-02-21

## 2025-02-24 DIAGNOSIS — L93.2 CUTANEOUS LUPUS ERYTHEMATOSUS: ICD-10-CM

## 2025-02-24 RX ORDER — PREDNISONE 5 MG/1
5 TABLET ORAL DAILY
Qty: 90 TABLET | Refills: 3 | Status: SHIPPED | OUTPATIENT
Start: 2025-02-24

## 2025-03-05 ENCOUNTER — APPOINTMENT (OUTPATIENT)
Dept: RHEUMATOLOGY | Facility: CLINIC | Age: 75
End: 2025-03-05
Payer: MEDICARE

## 2025-03-05 VITALS
WEIGHT: 205 LBS | DIASTOLIC BLOOD PRESSURE: 63 MMHG | HEART RATE: 64 BPM | BODY MASS INDEX: 29.35 KG/M2 | TEMPERATURE: 97.3 F | HEIGHT: 70 IN | SYSTOLIC BLOOD PRESSURE: 140 MMHG

## 2025-03-05 DIAGNOSIS — M32.9 SLE (SYSTEMIC LUPUS ERYTHEMATOSUS RELATED SYNDROME) (MULTI): Primary | ICD-10-CM

## 2025-03-05 DIAGNOSIS — L93.2 CUTANEOUS LUPUS ERYTHEMATOSUS: ICD-10-CM

## 2025-03-05 RX ORDER — PREDNISONE 2.5 MG/1
TABLET ORAL
Qty: 278 TABLET | Refills: 0 | Status: SHIPPED | OUTPATIENT
Start: 2025-03-05 | End: 2025-07-01

## 2025-03-05 RX ORDER — METHOTREXATE 2.5 MG/1
15 TABLET ORAL
Qty: 102 TABLET | Refills: 0 | Status: SHIPPED | OUTPATIENT
Start: 2025-03-09 | End: 2025-07-07

## 2025-03-05 NOTE — PROGRESS NOTES
Primary Care Physician: No Assigned PCP Generic Provider, MD      Date of Visit: 04/03/2025  8:20 AM EDT  Location of visit: [facility]   Type of Visit: Established Patient     HPI / Summary:   Patient is a 74 year old man with h/o HTN with aortic aneurysm (4.5 -4.6 cm) with coronary artery calcifications found to have 3 vessel disease who underwent CABG x 4 and ascending aortic replacement with 30mm Gelweave graft on May 5 2022 who returns   for follow-up      CAD risk factors: former smoker, no DM, no FHx of CAD, mildly elevated lipids  Pt with moderately dilated ascending aorta which appears to be stable in size since 2019. Was also found to have coronary artery calcifications. Echo shows trileaflet aortic valve.  April 24 2022: CTA with heart flow : CAC total 514.8 ( .5, LCx 0, .3  LM no significant disease, LAD: proximal to mid focal 70% stenosis, Ramus with proximal to mid focal calcific stenosis up to 70%, RCA focal calcific plaque. Ascending aorta 4..6 cm. CT FFR showed mid LAD stenosis FF 0.57, OM1 0.62, distal RCA 0,9  Cardiac cath 4/29/2022: ( [facility]) - LAD proximal 80%, OM1 90%, Ramus 80%, RPL proximal 50%  5/4/2022; CABG x 4( LIMA-LAD, [id number], Radial-RCA. and AA replacement with 30mm Gelweave graft ( [facility])   Hospital course was uneventful and pt was d/c to home to continue imdur for 1 month for radial artery ( to prevent spasm). d/c to home 5/11/2022     Patient was doing very well though in the fall of 2023 developed a total body rash which was eventually biopsied by dermatology who felt this was a drug rash. Initially they thought was due to his statin so statin was helped for 30 days. The rash peristed despite being off statin for over 30 days, so that was resumed and later thought  it was due to his hydrochlorothiazide which has since been stopped. Rash came back when prednisone was tapered so was seen by allergy and rash was biopsied again. Now dx was autoimmune  disease. 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  From a cardiac perspective, he is doing well. His BP has been running a bit high at home ( 140-160mmHg systolic, however whenever at MD offices etc is well controlled. Have asked him to bring his monitor in to assess accuracy. He has no CP or SOB at rest or on exertion. He walks on the treadmill and uses  step master in addition to doing his 4 floor walkup without sx. He continues to use azithromycin for dental prophylaxis. Overall patient is feeling great. Has started on inclisiran and tolerating it well.           ROS: Full 10 pt review of symptoms of negative unless discussed above.     Problems:   Patient Active Problem List   Diagnosis    Benign essential HTN    Abnormal laboratory test    Aortic aneurysm, thoracic    Benign neoplasm of skin    Bilateral sensorineural hearing loss    CAD (coronary artery disease)    Epidermal cyst    Fullness in ear, bilateral    Hypothyroidism    Left wrist pain    Left wrist tendonitis    Lesion of skin of scalp    Myopia of both eyes    Osteoarthritis of carpometacarpal (CMC) joint of thumb    Other hypertrophic disorders of the skin    Pain of both elbows    Hypertension    Pseudogout of right wrist    S/P ascending aortic replacement    S/P CABG x 4    Serous otitis media    Single seizure (Multi)    Other seborrheic keratosis    Thrombocytopenia (CMS-HCC)    Presbyopia    Age-related nuclear cataract of both eyes    Interstitial granulomatous dermatitis    Long-term use of hydroxychloroquine    Combined form of age-related cataract, right eye    Combined form of age-related cataract, left eye    Astigmatism of both eyes    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     Medical History:   Past Medical History:   Diagnosis Date    Heart disease     Personal history of other benign neoplasm     History of benign neoplasm of skin    Unspecified convulsions (Multi)     Single seizure     Surgical Hx:   Past Surgical History:   Procedure Laterality Date    CARDIAC SURGERY      CT ANGIO CORONARY ART WITH HEARTFLOW IF SCORE >30%  04/19/2022    CT HEART CORONARY ANGIOGRAM 4/19/2022 [facility] ANCILLARY LEGACY      Social Hx:   Tobacco Use: Medium Risk (4/3/2025)    Patient History     Smoking Tobacco Use: Former     Smokeless Tobacco Use: Never     Passive Exposure: Past     Alcohol Use: Not At Risk (5/30/2024)    AUDIT-C     Frequency of Alcohol Consumption: 2-3 times a week     Average Number of Drinks: 1 or 2     Frequency of Binge Drinking: Never     Family Hx:   Family History   Problem Relation Name Age of Onset    Hypertension Mother Latosha       Exam:   Vitals:   Vitals:    04/03/25 0837   BP: 118/65   Pulse: 56   SpO2: 98%   Weight: 92.9 kg (204 lb 12.8 oz)   Height: 1.778 m (5' 10\")     Wt Readings from Last 5 Encounters:   04/03/25 92.9 kg (204 lb 12.8 oz)   03/05/25 93 kg (205 lb)   02/10/25 93 kg (205 lb)   02/05/25 90.3 kg (199 lb)   12/31/24 92.4 kg (203 lb 11.3 oz)      Constitutional:       Appearance: Healthy appearance. Not in distress.   Pulmonary:      Effort: Pulmonary effort is normal.      Breath sounds: Normal breath sounds.   Cardiovascular:      PMI at left midclavicular line. Normal rate. Regular rhythm. S1 with normal intensity. S2 with normal intensity.       Murmurs: There is no murmur.   Pulses:     Intact distal pulses.   Edema:     Peripheral edema absent.   Skin:     Comments: Raised and red rash over back and abdomen   Neurological:      Mental Status: Alert and oriented to person, place, and time.     Labs:   Recent Labs     02/13/25  0743 11/06/24  1515 08/14/24  1629 06/13/24  0817 12/07/23  0722 06/20/23  0705 05/26/22  1017 " 05/11/22  0625   WBC 3.9 4.5 4.2* 5.7 5.1 3.7* 3.8* 7.5   HGB 15.5 14.3 14.5 14.2 13.7 15.0 10.1* 8.8*   HCT 45.8 42.5 44.1 43.5 41.1 45.0 32.9* 25.6*    128* 115* 97* 143* 101* 299 166   MCV 84.2 83 84 86 85 84 88 85     Recent Labs     02/13/25  0743 12/26/24  0716 11/06/24  1515 08/14/24  1629 06/26/24  1515 12/07/23  0722 06/20/23  0705 09/07/22  0834    140 139 139 142 140 139 136   K 4.2 3.8 4.1 3.5 4.0 3.6 3.7 4.6   CL 99 97* 96* 97* 98 101 100 97*   BUN 12 12 10 11 13 15 14 12   CREATININE 0.71 0.69 0.68 0.78 0.70 0.84 0.77 0.75      Recent Labs     02/13/25  0745   HGBA1C 5.5     Lab Results   Component Value Date    CHOL 102 02/13/2025    HDL 31.5 02/13/2025    LDLF 54 06/20/2023    TRIG 80 02/13/2025     Lab Results   Component Value Date    LDLCALC 55 02/13/2025      Notable Studies: imaging personally reviewed and summarized by me below  EKG:  Sinus bradycardia at 56 bpm, right axis  with septal Q waves. Unchanged from EKG from 6/14/2024    - Echo (4/5/2023)  PHYSICIAN INTERPRETATION:  Left Ventricle: The left ventricular systolic function is normal, with an estimated ejection fraction of 60-65%. The calculated ejection fraction is normal at 63 % using the Hu's Bi-plane MOD calculation. There are no regional wall motion abnormalities. The left ventricular cavity size is normal. The left ventricular septal wall thickness is mildly increased. There is normal left ventricular posterior wall thickness. Abnormal (paradoxical) septal motion consistent with post-operative status. Spectral Doppler shows a normal pattern of left ventricular diastolic filling.  Left Atrium: The left atrium is moderately dilated. There has been an interval increase in the Left Atrial Volume Index from38.4 ml/m2 to 47.1 ml/m2.  Right Ventricle: The right ventricle is normal in size. There is normal right ventricular global systolic function.  Right Atrium: The right atrium is mildly dilated.  Aortic Valve: The  aortic valve is trileaflet. There is no evidence of aortic valve regurgitation. The peak instantaneous gradient of the aortic valve is 8.5 mmHg. The mean gradient of the aortic valve is 3.0 mmHg.  Mitral Valve: The mitral valve is mildly thickened. There is trace mitral valve regurgitation.  Tricuspid Valve: The tricuspid valve is structurally normal. There is mild tricuspid regurgitation. The Doppler estimated RVSP is mildly elevated at 39.7 mmHg.  Pulmonic Valve: The pulmonic valve is structurally normal. There is physiologic pulmonic valve regurgitation.  Pericardium: There is no pericardial effusion noted.  Aorta: The aortic root is abnormal. There is no dilatation of the ascending aorta. There is no dilatation of the aortic root. There is a prosthetic graft seen in the position of the ascending aorta. Aorta is known to be a 30 mm Gelweave Graft placed 5/5/2022.  Systemic Veins: The inferior vena cava appears to be of normal size.  In comparison to the previous echocardiogram(s): Compared with study from 2/23/2022,. Patient is known to have undergone CABG with Ascending Aorta Replacement. LVEF remains stable. Mild Increase in RVSP.        CONCLUSIONS:  1. Left ventricular systolic function is normal with a 60-65% estimated ejection fraction.  2. Abnormal septal motion consistent with post-operative status.  3. The left atrium is moderately dilated.  4. Mildly elevated RVSP.  5. Prosthetic graft in the ascending aorta position.  6. Patient is known to have undergone CABG with Ascending Aorta Replacement. LVEF remains stable. Mild Increase in RVSP.        CTA of chest 2/22/2023  FINDINGS:  Potential study limitations:  None.     THORACIC AORTA:  The patient is status post ascending aortic interposition graft  placement.  The sinotubular junction is  postsurgical.  There is no evidence for acute aortic pathology, such as dissection,  intramural hematoma, or contained rupture.  The arch vessel branching pattern is   conventional.  All of the arch  branch vessels appear widely patent in their proximal portions.  Visualized proximal abdominal aorta is normal.  The celiac trunk, SMA and bilateral renal arteries are normal in  caliber.     REPRESENTATIVE MEASUREMENTS OF THE AORTA:  Annulus  2.1 x  2.5 cm  Root (Sinus of Valsalva)  3.4 cm  Sinotubular junction  3.3 cm  Mid ascending  3.6 cm     Mid descending  2.5 cm        CORONARY ARTERIES:  The examination is not optimized for assessment of the coronary  arteries.  There is extensive calcification of the native coronary arteries.  The patient is status post LIMA graft placement.        PULMONARY ARTERIES:  The central pulmonary arteries appear  normal (MPA-   cm, RPA-   cm,  LPA-  cm).     SYSTEMIC AND PULMONARY VEINS:  Normal systemic venous and pulmonary venous return.  The SVC and IVC are of normal caliber.  Normal pulmonary venous anatomy.     CARDIAC CHAMBERS:  Normal atrioventricular and ventriculoarterial concordance.     LEFT ATRIUM:  Normal size (Area-   cm2)     RIGHT ATRIUM:  Normal size (Area-   cm2)     INTERATRIAL SEPTUM:  Intact.     LEFT VENTRICLE:  Normal size (WAYLON-   cm)     RIGHT VENTRICLE:  Normal size (WAYLON-  cm)     INTERVENTRICULAR SEPTUM:  Intact.     AORTIC VALVE:  The aortic valve is  trileaflet in morphology.  No calcifications.     MITRAL VALVE:  No thickening/calcification.     PERICARDIUM:  There is no pericardial effusion or thickening.     CHEST:  There is thyromegaly.  There is minimal right basilar atelectasis. There is lingular and  left basilar atelectasis.  There are scattered mediastinal lymph nodes are felt to be reactive  in nature.  Esophagus is normal.     UPPER ABDOMEN:  Limited imaging through the upper abdomen reveals no abnormalities of  the visualized organs.     BONE AND SOFT TISSUE:  No suspicious osseous abnormality.      Impression   1.  There are postoperative changes consistent with valve sparing  interposition graft placement.  Post LIMA changes. No evidence of  aneurysmal dilatation.        CTA of chest 12/30/2025:  IMPRESSION:  1.  Postsurgical changes status post supravalvular interposition  graft placement.  2. No evidence of aneurysm leak or postoperative fluid collections.  3. Mild thyromegaly and correlate with thyroid function.      Current Outpatient Medications   Medication Instructions    amLODIPine (NORVASC) 10 mg, Daily    aspirin 81 mg EC tablet 1 tablet, Daily    azithromycin (ZITHROMAX) 250 mg, Daily    carvedilol (COREG) 25 mg, oral, 2 times daily    fish oil concentrate (Omega-3) 120-180 mg capsule 1 capsule, Daily    folic acid (FOLVITE) 1 mg, oral, Daily    hydroCHLOROthiazide (HYDRODIURIL) 25 mg, oral, Daily    hydroxychloroquine (PLAQUENIL) 200 mg, oral, 2 times daily    inclisiran (LEQVIO) 284 mg, subcutaneous, Every 3 months, at 0, 3 months and then every 6 months thereafter    levothyroxine (Synthroid, Levoxyl) 137 mcg tablet Take 1 tablet once daily except none on Sundays    losartan (Cozaar) 100 mg tablet TAKE 1 TABLET DAILY IN THE EVENING (DOSE INCREASED, STOP ISOSORBIDE)    methotrexate (TREXALL) 15 mg, oral, Once Weekly, Follow directions carefully, and ask to explain any part you do not understand. Take exactly as directed.    multivitamin with minerals iron-free (Centrum Silver) 1 tablet, Daily    neomycin-polymyxin-HC (Cortisporin) 3.5-10,000-1 mg/mL-unit/mL-% otic suspension 4 drops in the affected ear 3 times a day x 10 days    predniSONE (Deltasone) 2.5 mg tablet Start taking 5 mg (2 pills) on day one, followed by 2.5 mg (1 pill) on the second day, you will alternate this combination for 14 days. Then, take 2.5 mg (1 pill) daily for 14 days. Then, for the next 14 days, take 2.5 mg (1 pill) every other day for 14 days    predniSONE (Deltasone) 2.5 mg tablet Take 4 tablets (10 mg) by mouth once daily for 14 days, THEN 3 tablets (7.5 mg) once daily for 14 days, THEN 2 tablets (5 mg) once daily.     predniSONE (DELTASONE) 5 mg, oral, Daily     Impressions and Plan:    Patient is a 74 year old man with HTN and a moderately dilated aortic aneurysm found to have extensive coronary artery calcifications found to have 3 vessel disease and is post op from CABG x 4 with ascending aortic replacement with 30mm Gelweave conduit. He is doing quite well from a cardiac perspective  performing ADLS ( including going up 4 floor walkup)  and walking on treadmill and using stop master without CP or SOB.  Had total body rash and on biopsied and initially thought to be a drug rash. After holding various medications without improvement in rash,  rash was biopsied and now though to be autoimmune. Now being treated by rheumatologist with plaquinil, methotrexate  and prednisone. Rash is improving. He remains off rosuvastatin and has started inclisiran which he has tolerated well with good response  Plan  1. Aortic aneurysm- now s/p Ascending aortic replacement with 30 mm Gelweave conduit. All stable on CTA 12/30/2024. At this point can check CTA  every 2 year . To use antibiotic prophylaxis prior to dental work( azithromycin due to PCN allergy)   2. Hypertension- continue Losartan 100 mg in the PM amlodipine 10 mg daily ,carvedilol 25 mg bid and hydrochlorothiazide 25 mg daily. Home BP and hr checks and log for review. Well controlled in office visits though high at home. To bring in home monitor to assess accuracy  3. CAD now s/p CABG- aspirin 81 mg daily,  losartan and carvedilol   target LDL< 55. On inclisiran is currently at target.   4. Hyperlipidemia- target LDL < 55. Continue inclisiran.   5. to use antibiotic prophylaxis prior to dental work ( azithromycin due to PCN allergy)  8. Rash- continue working with rheumatology and dermatology  Return to clinic in 4 months  Patient Instructions:  If you have any questions or need cardiac medication refills, please call my office at [phone],      To reach my office please call  [phone]  To schedule an appointment call [phone].          ____________________________________________________________  Mara Estrella MD  Division of Cardiovascular Medicine  [facility]

## 2025-03-05 NOTE — PROGRESS NOTES
Subjective   Patient ID: Jalen Laboy is a 74 y.o. male who presents for Follow-up (Cutaneous lupus erythematosus).    HPI  74 yo male with HTN, HLD, IHD  He denies any joint pain or AM stiffness, Raynaud  He denies any malar rash, but he has photosensitive  He had a skin rashes in his body, legs  His rashes started last summer, he used some topical treatment  Derm saw him and started PRD and his rashes improved, but when he tapered off PRD, his rashes came back  Then, his NESSA came positive.  3-4 weeks ago, they started  mg daily.  Interstitial granulomatous dermatitis  He had an open heart surgery 2 years ago  He was using statin and still using it now.  He is also using levithyroxin, he had hypothryoidisim in 01/23  No family h/o SLE, CTD or inflammatory arthritis  Using HCQ     Interval history:  He stopped PRD 2 months ago and his skin rashes came back  He reports skin rashes all over his body especially in is upper extremity, back and arms  He is using  regularly    11/2024:  His rash improved significantly, having still some rash, but no itching or discomfort now.  He tapered down his PRD to 5 mg daily  He does have ear problem, ear fullness and hearing loss.    03/25:  He tapered town his PRD to 2.5 and then he has noted rash in his arms and abdomen.    Current meds:  MTX 12.5 mg week    PRD 2.5 mg daily    NESSA 1/2560  ESR 14  CRP 1.23 (3.7)  RNP 2.3  Histon 1.0  06/23:  WBC 3.7, PLt 101  ROS  Joint pain in hands: negative   Joint swelling: negative  Morning stiffness and duration: negative   strength: normal  Oral ulcer: negative  Genital ulcer: negative  Raynaud phenomenon: negative  Chest pain/dyspnea: negative  Low back pain: negative  Visual problem: negative  Dry eyes/dry mouth: negative  Skin rash/scaling/psoriasis: negative       Objective     PEXAM  VS reviewed, WNL  General: Alert, no distress   HEENT: Normocephalic/atraumatic, No alopecia. PERRLA. Sclera white,  conjunctiva pink, no malar rash. no oral or nasal ulcer. Oral cavity pink and moist, no erythema or exudate, dentition good.   Neck: supple  Respiratory: CTA B, no adventitious breath sounds  Cardiac: RRR, no murmurs, carotid, or bruits  Abdominal: symmetrical, soft, non-tender, non-distended, normoactive BSx4 quadrants, no CVA tenderness or suprapubic tenderness  MSK: Joints of upper and lower extremities were assessed for synovitis and ROM.    Today she has no evidence of synovitis in the joints of her hands or wrists, tender joint count 0, swollen joint count 0   Extremities: no clubbing, no cyanosis, no edema  Skin: [place] rash in his arms and abdomen, back  Neuro: non-focal, Strength 5/5 throughout. Normal gait. No cerebellar pathologic exam     Assessment/Plan   73 yo male with HTN, HLD, CHD and hypothyroidisim  He had a rash in last summer, his rash was photosensitive  Before rash he was diagnosed with hypothyroidism  He used PRD and then his rash improved, but it recurred after stopping PRD.  Skin bx showed interstitial granulomatous dermatitis  His NESSA came high titer positive and his CBC showed leukopenia and low Plt at that time of rash.  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 His skin rashes improved after PRD, but when he tapered off PRD, his rashes came back.  He is concerned about Drug induced lupus because of positive histon. But, his histone is very weak pos (1.0). He was using statin and amlodipine, no other meds.  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  His symptoms improved significantly, tapered down his PRD to 5 mg daily, he does have only residual mild rash, but no itching.  His PExam did not reveal any synovitis, it showed non-palpable mild rash in his arms and torso  I think he had cutaneous lupus flare, but he has also systemic lupus because his NESSA, RNP pos and he has leukopenia and thrombocytopenia.  03/25:  He  tapered down his PRD to 2.5 mg and then he had rash again  His ESR, CRP were NL recently  -will increase his PRD to 10 , and then will go back to 2.5  -will increase methotrexate to 15 and same dose   -will consider adding anifrolumab if he has multiple flares  -will see him in 3-4 months

## 2025-03-21 DIAGNOSIS — Z79.899 OTHER LONG TERM (CURRENT) DRUG THERAPY: ICD-10-CM

## 2025-03-21 DIAGNOSIS — L98.6 OTHER INFILTRATIVE DISORDERS OF THE SKIN AND SUBCUTANEOUS TISSUE: ICD-10-CM

## 2025-03-21 DIAGNOSIS — Z86.2 HISTORY OF AUTOIMMUNE DISEASE: ICD-10-CM

## 2025-03-31 ENCOUNTER — APPOINTMENT (OUTPATIENT)
Dept: INFUSION THERAPY | Facility: CLINIC | Age: 75
End: 2025-03-31
Payer: MEDICARE

## 2025-03-31 VITALS
SYSTOLIC BLOOD PRESSURE: 121 MMHG | OXYGEN SATURATION: 98 % | HEART RATE: 61 BPM | DIASTOLIC BLOOD PRESSURE: 64 MMHG | TEMPERATURE: 96.8 F | RESPIRATION RATE: 16 BRPM

## 2025-03-31 DIAGNOSIS — E06.3 HYPOTHYROIDISM DUE TO HASHIMOTO'S THYROIDITIS: ICD-10-CM

## 2025-03-31 DIAGNOSIS — Z95.1 S/P CABG X 4: ICD-10-CM

## 2025-03-31 DIAGNOSIS — I25.10 CORONARY ARTERY DISEASE INVOLVING NATIVE HEART WITHOUT ANGINA PECTORIS, UNSPECIFIED VESSEL OR LESION TYPE: ICD-10-CM

## 2025-03-31 RX ORDER — EPINEPHRINE 0.3 MG/.3ML
0.3 INJECTION SUBCUTANEOUS EVERY 5 MIN PRN
OUTPATIENT
Start: 2025-06-29

## 2025-03-31 RX ORDER — FAMOTIDINE 10 MG/ML
20 INJECTION, SOLUTION INTRAVENOUS ONCE AS NEEDED
OUTPATIENT
Start: 2025-06-29

## 2025-03-31 RX ORDER — ALBUTEROL SULFATE 0.83 MG/ML
3 SOLUTION RESPIRATORY (INHALATION) AS NEEDED
OUTPATIENT
Start: 2025-06-29

## 2025-03-31 RX ORDER — DIPHENHYDRAMINE HYDROCHLORIDE 50 MG/ML
50 INJECTION, SOLUTION INTRAMUSCULAR; INTRAVENOUS AS NEEDED
OUTPATIENT
Start: 2025-06-29

## 2025-03-31 ASSESSMENT — ENCOUNTER SYMPTOMS
WOUND: 0
SHORTNESS OF BREATH: 0
DIZZINESS: 0
LEG SWELLING: 0
NUMBNESS: 0
LIGHT-HEADEDNESS: 0
PALPITATIONS: 0
COUGH: 0
WHEEZING: 0
EXTREMITY WEAKNESS: 0

## 2025-03-31 NOTE — PATIENT INSTRUCTIONS
Today :We administered inclisiran. Leqvio 284mg subcutaneous injection left upper arm  Fasting Lipid Panel before next Leqvio injection, wait at least 1 month    For:   1. Coronary artery disease involving native heart without angina pectoris, unspecified vessel or lesion type    2. S/P CABG x 4       Your next appointment is due in:  6 months        Please read the  Medication Guide that was given to you and reviewed during todays visit.     (Tell all doctors including dentists that you are taking this medication)     Go to the emergency room or call 911 if:  -You have signs of allergic reaction:   -Rash, hives, itching.   -Swollen, blistered, peeling skin.   -Swelling of face, lips, mouth, tongue or throat.   -Tightness of chest, trouble breathing, swallowing or talking     Call your doctor:  - If injection site gets red, warm, swollen, itchy or leaks fluid or pus.     (keep area clean and dry)  - If you get sick or have symptoms of infection or are not feeling well for any reason.    (Wash your hands often, stay away from people who are sick)  - If you have side effects from your medication that do not go away or are bothersome.     (Refer to the teaching your nurse gave you for side effects to call your doctor about)    - Common side effects may include:  stuffy nose, headache, feeling tired, muscle aches, upset stomach  - Before receiving any vaccines     - Call the [facility] at   If:  - You get sick, are on antibiotics, have had a recent vaccine, have surgery or dental work and your doctor wants your visit rescheduled.  - You need to cancel and reschedule your visit for any reason. Call at least 2 days before your visit if you need to cancel.   - Your insurance changes before your next visit.    (We will need to get approval from your new insurance. This can take up to two weeks.)     The [facility] is opened Monday thru Friday. We are closed on weekends and holidays.   Voice  mail will take your call if the center is closed. If you leave a message please allow 24 hours for a call back during weekdays. If you leave a message on a weekend/holiday, we will call you back the next business day.    A pharmacist is available Monday - Friday from 8:30AM to 3:30PM to help answer any questions you may have about your prescriptions(s). Please call pharmacy at:    [facility]: [phone]  [facility]: [phone]  [facility]: [phone]

## 2025-03-31 NOTE — PROGRESS NOTES
[facility]   Infusion Clinic Note   Date: 2025   Name: Jalen Laboy  : 1950   MRN: [id number]         Reason for Visit: Injections (3 month Leqvio 284mg subcutaneous injection)         Today: We administered inclisiran.       Ordered By: Mara Estrella MD       For a Diagnosis of: Coronary artery disease involving native heart without angina pectoris, unspecified vessel or lesion type    S/P CABG x 4       At today's visit patient accompanied by: Self      Today's Vitals:   Vitals:    25 0834   BP: 121/64   Pulse: 61   Resp: 16   Temp: 36 °C (96.8 °F)   SpO2: 98%             Pre - Treatment Checklist:      - Previous reaction to current treatment: no      (Assess patient for the concerns below. Document provider notification as appropriate).  - Active or recent infection with/without current antibiotic use: no  - Recent or planned invasive dental work: no  - Recent or planned surgeries: no  - Recently received or plans to receive vaccinations: no  - Has treatment related toxicities: no  - Any chance may be pregnant:  n/a      Pain: 0   - Is the pain different from normal: n/a   - Is prescribing Doctor aware:  n/a      Labs: Reviewed       Fall Risk Screening: Pollard Fall Risk  History of Falling, Immediate or Within 3 Months: No  Secondary Diagnosis: Yes  Ambulatory Aid: Walks without aid/bedrest/nurse assist  Intravenous Therapy/Heparin Lock: No  Gait/Transferring: Normal/bedrest/immobile  Mental Status: Oriented to own ability  Pollard Fall Risk Score: 15       Review Of Systems:  Review of Systems   Respiratory:  Negative for cough, shortness of breath and wheezing.    Cardiovascular:  Negative for chest pain, leg swelling and palpitations.   Skin:  Positive for itching and rash. Negative for wound.        Rash/Itching, on Prednisone  Borderline Lupus/Autoimmune   Neurological:  Negative for dizziness, extremity weakness, light-headedness and numbness.          Infusion Readiness:  - Assessment Concerns Related to Infusion: No  - Provider notified: n/a      New Patient Education:    N/A (returning patient for continuation of therapy. Ongoing education provided as needed.)        Treatment Conditions & Drug Specific Questions:    Inclisiran (LEQVIO)     (Unless otherwise specified on patient specific therapy plan):     REMINDER:  Recommended Vitals/Observation:  Vitals: Obtain vital signs prior to injection and at end of observation period.    Observation: Patient may leave 30 minutes after the FIRST injection. Patient may leave immediately following injection for all subsequent doses    Lab Results   Component Value Date    CHOL 102 02/13/2025    CHOL 148 12/06/2024    CHOL 90 03/11/2024     Lab Results   Component Value Date    HDL 31.5 02/13/2025    HDL 31.4 12/06/2024    HDL 38.2 03/11/2024     Lab Results   Component Value Date    LDLCALC 55 02/13/2025    LDLCALC 96 12/06/2024    LDLCALC 41 03/11/2024     Lab Results   Component Value Date    TRIG 80 02/13/2025    TRIG 102 12/06/2024    TRIG 53 03/11/2024         Weight Based Drug Calculations:    WEIGHT BASED DRUGS: NOT APPLICABLE / FLAT DOSE       Post Treatment: Patient tolerated treatment without issue and was discharged in no apparent distress.      Note Authored / Patient Cared for By: Crissy Anton LPN

## 2025-04-03 ENCOUNTER — OFFICE VISIT (OUTPATIENT)
Dept: CARDIOLOGY | Facility: HOSPITAL | Age: 75
End: 2025-04-03
Payer: MEDICARE

## 2025-04-03 VITALS
SYSTOLIC BLOOD PRESSURE: 118 MMHG | DIASTOLIC BLOOD PRESSURE: 65 MMHG | HEART RATE: 56 BPM | OXYGEN SATURATION: 98 % | HEIGHT: 70 IN | BODY MASS INDEX: 29.32 KG/M2 | WEIGHT: 204.8 LBS

## 2025-04-03 DIAGNOSIS — Z95.1 S/P CABG X 4: ICD-10-CM

## 2025-04-03 DIAGNOSIS — I10 BENIGN ESSENTIAL HTN: ICD-10-CM

## 2025-04-03 DIAGNOSIS — E78.5 DYSLIPIDEMIA: ICD-10-CM

## 2025-04-03 DIAGNOSIS — I25.10 CORONARY ARTERY DISEASE INVOLVING NATIVE HEART WITHOUT ANGINA PECTORIS, UNSPECIFIED VESSEL OR LESION TYPE: Primary | ICD-10-CM

## 2025-04-03 DIAGNOSIS — Z95.828 S/P ASCENDING AORTIC REPLACEMENT: ICD-10-CM

## 2025-04-03 LAB
ATRIAL RATE: 56 BPM
P AXIS: 75 DEGREES
P OFFSET: 199 MS
P ONSET: 137 MS
PR INTERVAL: 170 MS
Q ONSET: 222 MS
QRS COUNT: 9 BEATS
QRS DURATION: 82 MS
QT INTERVAL: 414 MS
QTC CALCULATION(BAZETT): 399 MS
QTC FREDERICIA: 405 MS
R AXIS: 111 DEGREES
T AXIS: 49 DEGREES
T OFFSET: 429 MS
VENTRICULAR RATE: 56 BPM

## 2025-04-03 PROCEDURE — 93005 ELECTROCARDIOGRAM TRACING: CPT | Performed by: INTERNAL MEDICINE

## 2025-04-03 PROCEDURE — 99214 OFFICE O/P EST MOD 30 MIN: CPT | Performed by: INTERNAL MEDICINE

## 2025-04-03 NOTE — PATIENT INSTRUCTIONS
1. Aortic aneurysm- now s/p Ascending aortic replacement with 30 mm Gelweave conduit. All stable on CTA 1/2025. Will continue with  2 year follow up CTA. ( Will be due after 1/2027). To use antibiotic prophylaxis prior to dental work( azithromycin due to PCN allergy)   2. Hypertension- BP well controlled these days on current regimen in office visits ( despite even being on prednisone.) To continue Losartan 100 mg in the PM amlodipine 10 mg daily, carvedilol 25 mg bid and hydrochlorothiazide 25 mg daily. BP appears to run higher at home than at MD or infusion ceneter visits. To bring in monitor to assess accuracy  3. CAD now s/p CABG- aspirin 81 mg daily,  losartan, carvedilol . Off rosuvastatin  and now on leqvio. LDL at target < 55 given prior CABG.  4. Hyperlipidemia- target LDL < 55.  At target LDL on Leqvio.  5. To use antibiotic prophylaxis prior to dental work ( azithromycin due to PCN allergy)    Return to clinic in 4 months. Will call us sooner is monitor is accurate and BP not adequately controlled at home.

## 2025-05-12 ENCOUNTER — PATIENT MESSAGE (OUTPATIENT)
Dept: ENDOCRINOLOGY | Facility: CLINIC | Age: 75
End: 2025-05-12
Payer: MEDICARE

## 2025-05-12 DIAGNOSIS — E06.3 HYPOTHYROIDISM DUE TO HASHIMOTO'S THYROIDITIS: ICD-10-CM

## 2025-05-12 RX ORDER — LEVOTHYROXINE SODIUM 137 UG/1
TABLET ORAL
Qty: 90 TABLET | Refills: 1 | Status: SHIPPED | OUTPATIENT
Start: 2025-05-12

## 2025-05-15 NOTE — PROGRESS NOTES
PATIENT ID  Jalen Laboy IS A 74 y.o. male WHO PRESENTS FOR      HPI   This 74-year-old male is being seen back in the office in follow-up.  He has difficulties with impacted cerumen or affecting hearing.  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  After debridement of his ears at his last visit an audiogram was done showing evidence for sensorineural hearing loss on his right ear and mixed hearing loss on his left ear with significant discrimination changes on the left.  There was no explanation for the conductive loss based upon tympanic membrane perforation or thickness and there was no evidence for middle ear fluid on exam or by tympanogram testing.  As such a recheck of hearing at this visit was recommended.  ROS    A 12 point ROS  has been reviewed and are negative for complaint except for what is stated in the history of present illness and /or for past medical history as noted in the EMR.     Medical History[1]    Current Medications[2]    Social History[3]    RX Allergies[4]     vitals were not taken for this visit.     PHYSICAL EXAM  Examination:     CONSTITUTIONAL: Alert, in no acute distress, normal pitch/clarity of voice, well-developed, well-nourished, cooperative.  HEAD/FACE: Normocephalic, atraumatic, no tenderness over the sinuses, facial strength and movement symmetric.     SKIN: Good turgor, no rashes, no suspicious lesions, in the head and neck.     EYES: Both eyes have normal extraocular movements with no nystagmus, pupils are equal and reactive to light and accommodation, conjunctiva is clear.     EARS: Both external ears were negative for skin lesions or abnormalities.  The right ear canal was patent with no obstruction the tympanic membrane was normal.  On the left ear there was a narrow canal no signs of acute inflammation.  There was some ceruminous debris which required removal.  Tympanic membranes were intact.     NOSE: No external skin lesions are noted, nares are  patent, septum is intact and noninflamed, nasal turbinates are normal in appearance, sinuses are nontender to palpation bilaterally, no internal lesions or polyps are noted, no discharge is noted.     OROPHARYNX/ORAL CAVITY: Mucous membranes of the oropharynx and the oral cavity proper are without lesions or ulcerations, tongue mobility is normal and no lesions are noted, gingiva and alveolar mucosa is intact without lesions, oral mucosa is moist, muscular movement of the palate and gag reflex are normal.     NECK: No lymphadenopathy is palpated, neck is supple with full range of motion, thyroid is without swelling or tenderness, trachea is midline, no neck masses are noted.     Lymphatics: No cervical adenopathy or supraclavicular adenopathy noted to palpation.     HEART/VASCULAR: No jugular venous distention is noted, carotid pulsations are intact with a regular rate and rhythm noted,     PULMONARY: Good air movement with normal inspiratory/expiratory effort is noted, no audible wheezing is appreciated.     NEUROLOGIC: Alert and oriented, cranial nerves are grossly intact, gait is normal, sensation in the head and neck is intact,     PSYCH: oriented to person, place and time, normal mood and affect.     EXTREMITIES: No motor dysfunction of the upper and lower extremity is noted.    Patient ID: Jalen Laboy is a 74 y.o. male.    Ear cerumen removal    Date/Time: 5/20/2025 10:59 AM    Performed by: Dallas King DMD, MD  Authorized by: Dallas King DMD, MD    Consent:     Consent obtained:  Verbal    Consent given by:  Patient    Risks discussed:  Pain and incomplete removal    Alternatives discussed:  No treatment and alternative treatment  Universal protocol:     Procedure explained and questions answered to patient or proxy's satisfaction: yes      Imaging studies available: no      Required blood products, implants, devices, and special equipment available: no      Patient identity confirmed:   Verbally with patient  Procedure details:     Location:  L ear and R ear    Procedure type: curette      Procedure type comment:  Or suction    Procedure outcomes: cerumen removed    Post-procedure details:     Inspection:  No bleeding and ear canal clear    Hearing quality:  Improved    Procedure completion:  Tolerated well, no immediate complications  Comments:      Microscopic evaluation of both ears.  There was dried ceruminous and epithelial debris in both ear canals left more than right partially removed due to discomfort with a wax loop primarily.  The tympanic memories are not involved.  There did not appear to be any signs of acute inflammation.  Beneath the surface ceruminous debris was keratin residue.    Repeat audiogram today revealed improvement in the hearing on the left ear.  There is basically mild sensorineural hearing loss up through 2000 Hz in both ears moderate in the remaining frequencies except for moderately severe on the left ear at 8000 Hz.  Discrimination scores are 100% on the right at 70 dB 100% on the left at 80 dB tympanograms were normal.    ASSESSMENT/PLAN  Problem List Items Addressed This Visit    None  Visit Diagnoses         Codes      Impacted cerumen of left ear    -  Primary H61.22      Sensorineural hearing loss (SNHL) of right ear with restricted hearing of left ear     H90.A21      Mixed conductive and sensorineural hearing loss of left ear with restricted hearing of right ear     H90.A32      Auditory discrimination impairment, left     H93.292      History of coronary artery disease     Z86.79      Chronic eczematous otitis externa of both ears     H60.8X3          I discussed the clinical finds with the patient.  From the standpoint of his exam he does have dried debris obstructive in the ear canals which was partially able to be removed.  The tympanic members were not involved.  He is going to be placed on fluocinolone oil drops 2 days weekly as treatment along with the  use external hand creams morning and night in both ears.  Keeping water out of the ears is advised.  She is can be placed on fluocinolone oil drops 2 days weekly and he is to use these drops for 2 days before his follow-up visits.  From a hearing standpoint he has improved on the left side but both ears do manifest a sensorineural loss and at some point amplification of his hearing would be of benefit.  We should first try to improve the skin health in the ear canals to make it more efficient for amplification of sound.       [1]   Past Medical History:  Diagnosis Date    Disease of thyroid gland     Eczema     Heart disease     Hypertension     Personal history of other benign neoplasm     History of benign neoplasm of skin    Shingles     Skin tag     Unspecified convulsions (Multi)     Single seizure    Varicella    [2]   Current Outpatient Medications:     amLODIPine (Norvasc) 10 mg tablet, Take 1 tablet (10 mg) by mouth once daily., Disp: , Rfl:     aspirin 81 mg EC tablet, Take 1 tablet (81 mg) by mouth once daily., Disp: , Rfl:     azithromycin (Zithromax) 250 mg tablet, Take 1 tablet (250 mg) by mouth once daily., Disp: , Rfl:     carvedilol (Coreg) 25 mg tablet, TAKE 1 TABLET TWICE A DAY, Disp: 180 tablet, Rfl: 3    fish oil concentrate (Omega-3) 120-180 mg capsule, Take 1 capsule (1 g) by mouth once daily., Disp: , Rfl:     folic acid (Folvite) 1 mg tablet, Take 1 tablet (1 mg) by mouth once daily., Disp: 30 tablet, Rfl: 11    hydroCHLOROthiazide (HYDRODiuril) 25 mg tablet, Take 1 tablet (25 mg) by mouth once daily., Disp: 30 tablet, Rfl: 11    hydroxychloroquine (Plaquenil) 200 mg tablet, Take 1 tablet (200 mg) by mouth 2 times a day., Disp: 180 tablet, Rfl: 3    inclisiran (Leqvio) injection, Inject 1.5 mL (284 mg total) under the skin every 3 months.  at 0, 3 months and then every 6 months thereafter, Disp: 1 each, Rfl: 2    levothyroxine (Synthroid, Levoxyl) 137 mcg tablet, Take 1 tablet once daily  except none on Sundays, Disp: 90 tablet, Rfl: 1    losartan (Cozaar) 100 mg tablet, TAKE 1 TABLET DAILY IN THE EVENING (DOSE INCREASED, STOP ISOSORBIDE), Disp: 90 tablet, Rfl: 3    methotrexate (Trexall) 2.5 mg tablet, Take 6 tablets (15 mg total) by mouth 1 (one) time per week.  Follow directions carefully, and ask to explain any part you do not understand. Take exactly as directed., Disp: 102 tablet, Rfl: 0    multivitamin with minerals iron-free (Centrum Silver), Take 1 tablet by mouth once daily., Disp: , Rfl:     neomycin-polymyxin-HC (Cortisporin) 3.5-10,000-1 mg/mL-unit/mL-% otic suspension, 4 drops in the affected ear 3 times a day x 10 days, Disp: 10 mL, Rfl: 0    predniSONE (Deltasone) 2.5 mg tablet, Start taking 5 mg (2 pills) on day one, followed by 2.5 mg (1 pill) on the second day, you will alternate this combination for 14 days. Then, take 2.5 mg (1 pill) daily for 14 days. Then, for the next 14 days, take 2.5 mg (1 pill) every other day for 14 days, Disp: 62 tablet, Rfl: 0    predniSONE (Deltasone) 2.5 mg tablet, Take 4 tablets (10 mg) by mouth once daily for 14 days, THEN 3 tablets (7.5 mg) once daily for 14 days, THEN 2 tablets (5 mg) once daily., Disp: 278 tablet, Rfl: 0    predniSONE (Deltasone) 5 mg tablet, TAKE 1 TABLET DAILY, Disp: 90 tablet, Rfl: 3  [3]   Social History  Tobacco Use    Smoking status: Former     Types: Cigarettes     Passive exposure: Past    Smokeless tobacco: Never   Substance Use Topics    Alcohol use: Not Currently    Drug use: Never   [4]   Allergies  Allergen Reactions    Penicillins Hives and Unknown

## 2025-05-16 DIAGNOSIS — L98.6 OTHER INFILTRATIVE DISORDERS OF THE SKIN AND SUBCUTANEOUS TISSUE: ICD-10-CM

## 2025-05-16 DIAGNOSIS — Z79.899 OTHER LONG TERM (CURRENT) DRUG THERAPY: ICD-10-CM

## 2025-05-16 DIAGNOSIS — Z86.2 HISTORY OF AUTOIMMUNE DISEASE: ICD-10-CM

## 2025-05-20 ENCOUNTER — APPOINTMENT (OUTPATIENT)
Dept: OTOLARYNGOLOGY | Facility: CLINIC | Age: 75
End: 2025-05-20
Payer: MEDICARE

## 2025-05-20 ENCOUNTER — APPOINTMENT (OUTPATIENT)
Dept: AUDIOLOGY | Facility: CLINIC | Age: 75
End: 2025-05-20
Payer: MEDICARE

## 2025-05-20 DIAGNOSIS — H60.8X3 CHRONIC ECZEMATOUS OTITIS EXTERNA OF BOTH EARS: ICD-10-CM

## 2025-05-20 DIAGNOSIS — H90.A21 SENSORINEURAL HEARING LOSS (SNHL) OF RIGHT EAR WITH RESTRICTED HEARING OF LEFT EAR: ICD-10-CM

## 2025-05-20 DIAGNOSIS — H90.3 SENSORINEURAL HEARING LOSS (SNHL) OF BOTH EARS: Primary | ICD-10-CM

## 2025-05-20 DIAGNOSIS — Z86.79 HISTORY OF CORONARY ARTERY DISEASE: ICD-10-CM

## 2025-05-20 DIAGNOSIS — H90.A32 MIXED CONDUCTIVE AND SENSORINEURAL HEARING LOSS OF LEFT EAR WITH RESTRICTED HEARING OF RIGHT EAR: ICD-10-CM

## 2025-05-20 DIAGNOSIS — H93.292 AUDITORY DISCRIMINATION IMPAIRMENT, LEFT: ICD-10-CM

## 2025-05-20 DIAGNOSIS — H61.22 IMPACTED CERUMEN OF LEFT EAR: Primary | ICD-10-CM

## 2025-05-20 RX ORDER — FLUOCINOLONE ACETONIDE 0.11 MG/ML
OIL AURICULAR (OTIC)
Qty: 20 ML | Refills: 3 | Status: SHIPPED | OUTPATIENT
Start: 2025-05-20 | End: 2025-05-30

## 2025-05-20 NOTE — PROGRESS NOTES
COMPREHENSIVE AUDIOMETRIC EVALUATION      Name:  Jalen Laboy  :  1950  Age:  74 y.o.  Date of Evaluation:  25   Referring Provider:  Dallas King DMD, MD     History:  Mr. Laboy was seen today for an evaluation of hearing.  Please recall, patient had a recent audiometric 2025 which revealed changes in hearing and fluctuations in word understanding as compared to previous evaluations.  Since last evaluation, patient reported that cerumen had been removed from his ears twice.  As compared to last evaluation, patient feels that hearing sensitivity is approximately the same.  When asked, patient denied otalgia    See audiometric evaluation at end of this report or scanned under media tab    OTOSCOPY (following cerumen removal by Dallas King DMD, MD) :       Right Ear: Clear canal       Left Ear: Minimal non-occluding cerumen and white debris    226 Hz TYMPANOMETRY:       Right Ear: Type A: normal peak pressure, compliance, and ear canal volume, consistent with middle ear function within normal limits       Left Ear: Type A: normal peak pressure, compliance, and ear canal volume, consistent with middle ear function within normal limits    AUDIOMETRIC EVALUATION (Phones):       Right Ear: Essentailly Mild through 2000 Hz sloping to Moderately severe, Sensorineural hearing loss                 Left Ear: Mild through 2000 Hz sloping to Severe, Sensorineural hearing loss           NOTE: Hearing sensitivity improved in the left ear essentially globally as compared to most recent audiometric evaluation 2025    Test technique:  Standard Audiometry  Reliability:   good    SPEECH RECOGNITION THRESHOLD:       Right Ear:  15 dBHL in fair agreement with PTA       Left Ear:  25 dBHL in good agreement with PTA    WORD RECOGNITION:       Right Ear:  100%  excellent (%) at elevated presentation level       Left Ear:   100%  excellent (%) at elevated presentation level    DISCUSSION:    Discussed results and recommendations with patient.  Questions were addressed and the patient was encouraged to contact our department should concerns arise.    RECOMMENDATIONS:  -Recommend patient return for hearing aid evaluation following medical clearance  -Recommend patient return for repeated audiometric evaluation should concerns for changes in hearing sensitivity arise or as medically indicated.  -Recommend patient continue with scheduled ENT follow up    Kandis Olvera, CCC-A     Appt: 11:00 - 11:30 AM

## 2025-05-28 DIAGNOSIS — M32.9 SLE (SYSTEMIC LUPUS ERYTHEMATOSUS RELATED SYNDROME) (MULTI): ICD-10-CM

## 2025-05-28 DIAGNOSIS — L93.2 CUTANEOUS LUPUS ERYTHEMATOSUS: ICD-10-CM

## 2025-05-28 RX ORDER — METHOTREXATE 2.5 MG/1
TABLET ORAL
Qty: 78 TABLET | Refills: 3 | Status: SHIPPED | OUTPATIENT
Start: 2025-05-28

## 2025-05-28 RX ORDER — PREDNISONE 2.5 MG/1
TABLET ORAL
Qty: 212 TABLET | Refills: 3 | Status: SHIPPED | OUTPATIENT
Start: 2025-05-28

## 2025-06-05 DIAGNOSIS — M32.9 SLE (SYSTEMIC LUPUS ERYTHEMATOSUS RELATED SYNDROME) (MULTI): ICD-10-CM

## 2025-07-04 ASSESSMENT — EXTERNAL EXAM - LEFT EYE: OS_EXAM: NORMAL, BROW PTOSIS

## 2025-07-04 ASSESSMENT — EXTERNAL EXAM - RIGHT EYE: OD_EXAM: NORMAL, BROW PTOSIS

## 2025-07-04 ASSESSMENT — CUP TO DISC RATIO
OD_RATIO: 0.3
OS_RATIO: 0.3

## 2025-07-04 ASSESSMENT — SLIT LAMP EXAM - LIDS
COMMENTS: GOOD POSITION
COMMENTS: GOOD POSITION

## 2025-07-04 NOTE — PROGRESS NOTES
Previous 12/6/23 - Dr. Babcock      Long term use of hydroxychloroquine  -On Hydroxychloroquine 200mg PO BID since 4/24/24. Color plates = 10/10 (7/8/25).  -HVF 10-2 (7/8/25) - OD: 2/17FL 11%FN. Scattered. OS: 15/16FL 4%FP 6%FN. Scattered.   -OCT macula (7/8/25) - Normal thickness and contour OU. Intact EZ OU. No edema OU. 299/289. Stable from 7/3/24.   -No history of renal/liver issues.   -Low risk of toxicity at this time given short time on medication, but risk to vision increases as length of time on medication increases. Recommend routine monitoring. Advised to call immediately if experiences any change in visual acuity/color vision.   -F/u 6 months to re-evaluate cataracts -- recheck refraction, glare/BAT, dilation.   Rheumatologist: Dr. Raymond Alba    Anterior basement membrane dystrophy, left eye  -6/2024 - History of corneal abrasion OS - rubbed his eye in his sleep, went to urgent care. Patient used erythromycin ophthalmic ointment, and pain resolved.   -May use artificial tears (Refresh, Systane, Theratears, Genteal, Blink, iVizia). Discussed risk of recurrent erosions with this condition. Advised to call/return to office if any pain, redness, tearing.     Combined form of age-related cataract, right eyeH25.811  Combined form of age-related cataract, left eyeH25.812  -Cataracts becoming visually significant OU. Symptoms: Gradual decrease in vision x 1-2 months. Harder to read small print. Glare from headlights when driving at night. Noticing more difficulty driving at night.   -Lenstar 7/8/25  -Pentacam (7/8/25) - OD: Irregular oblique. 43.7/44.6 @ 35.6. OS: SN steepening, oblique. 43.4/44.5 @ 132.7.   -Discussed aim - often takes glasses off for reading/near work. Discussed distance aim OU with reading glasses for best vision at near (explained will be unable to see clearly at near without reading glasses) vs retain myopic aim OU to allow for good uncorrected near/reading vision and will need to wear  glasses for distance/driving. Patient will think about options and we will discuss at next visit.     Myopia  Astigmatism  Presbyopia  -Current Rx from 12/6/23 - continue current glasses.   -Refraction performed today for diagnostic purposes only and without specific intent to dispense Rx. Glasses Rx not dispensed today.     No history of intraocular surgery/refractive surgery.   No FH of AMD/glaucoma

## 2025-07-08 ENCOUNTER — APPOINTMENT (OUTPATIENT)
Dept: OPHTHALMOLOGY | Facility: CLINIC | Age: 75
End: 2025-07-08
Payer: MEDICARE

## 2025-07-08 DIAGNOSIS — Z79.899 LONG-TERM USE OF HYDROXYCHLOROQUINE: Primary | ICD-10-CM

## 2025-07-08 DIAGNOSIS — H52.203 ASTIGMATISM OF BOTH EYES, UNSPECIFIED TYPE: ICD-10-CM

## 2025-07-08 DIAGNOSIS — H25.812 COMBINED FORM OF AGE-RELATED CATARACT, LEFT EYE: ICD-10-CM

## 2025-07-08 DIAGNOSIS — H52.13 MYOPIA OF BOTH EYES: ICD-10-CM

## 2025-07-08 DIAGNOSIS — H52.4 PRESBYOPIA: ICD-10-CM

## 2025-07-08 DIAGNOSIS — H18.522 ANTERIOR BASEMENT MEMBRANE DYSTROPHY (ABMD) OF LEFT EYE: ICD-10-CM

## 2025-07-08 DIAGNOSIS — H25.811 COMBINED FORM OF AGE-RELATED CATARACT, RIGHT EYE: ICD-10-CM

## 2025-07-08 ASSESSMENT — ENCOUNTER SYMPTOMS
ENDOCRINE NEGATIVE: 0
EYES NEGATIVE: 1
HEMATOLOGIC/LYMPHATIC NEGATIVE: 0
GASTROINTESTINAL NEGATIVE: 0
CARDIOVASCULAR NEGATIVE: 0
MUSCULOSKELETAL NEGATIVE: 0
CONSTITUTIONAL NEGATIVE: 0
ALLERGIC/IMMUNOLOGIC NEGATIVE: 0
NEUROLOGICAL NEGATIVE: 0
RESPIRATORY NEGATIVE: 0
PSYCHIATRIC NEGATIVE: 0

## 2025-07-08 ASSESSMENT — REFRACTION_WEARINGRX
OS_AXIS: 065
OD_ADD: +2.25
OS_CYLINDER: -1.25
OD_AXIS: 107
OS_ADD: +2.25
OD_CYLINDER: -0.50
OS_SPHERE: -2.25
OD_SPHERE: -2.25

## 2025-07-08 ASSESSMENT — VISUAL ACUITY
OD_BAT_MED: 20/200
OS_CC: 20/30
OS_BAT_MED: 20/200
CORRECTION_TYPE: GLASSES
METHOD: SNELLEN - LINEAR
OD_CC: 20/25-2

## 2025-07-08 ASSESSMENT — REFRACTION_MANIFEST
OD_AXIS: 110
OD_ADD: +2.50
OS_AXIS: 070
OS_SPHERE: -1.75
OD_SPHERE: -1.75
OD_CYLINDER: -1.00
OS_ADD: +2.50
OS_CYLINDER: -1.00

## 2025-07-08 ASSESSMENT — CONF VISUAL FIELD
OS_INFERIOR_TEMPORAL_RESTRICTION: 0
OD_INFERIOR_TEMPORAL_RESTRICTION: 0
OD_INFERIOR_NASAL_RESTRICTION: 0
OD_SUPERIOR_NASAL_RESTRICTION: 0
OS_SUPERIOR_TEMPORAL_RESTRICTION: 0
OS_NORMAL: 1
OD_NORMAL: 1
OS_INFERIOR_NASAL_RESTRICTION: 0
OD_SUPERIOR_TEMPORAL_RESTRICTION: 0
OS_SUPERIOR_NASAL_RESTRICTION: 0

## 2025-07-08 ASSESSMENT — TONOMETRY
IOP_METHOD: GOLDMANN APPLANATION
OS_IOP_MMHG: 13
OD_IOP_MMHG: 13

## 2025-07-11 ENCOUNTER — APPOINTMENT (OUTPATIENT)
Dept: RHEUMATOLOGY | Facility: CLINIC | Age: 75
End: 2025-07-11
Payer: MEDICARE

## 2025-07-11 VITALS
WEIGHT: 199.2 LBS | HEART RATE: 57 BPM | DIASTOLIC BLOOD PRESSURE: 65 MMHG | BODY MASS INDEX: 28.52 KG/M2 | SYSTOLIC BLOOD PRESSURE: 126 MMHG | TEMPERATURE: 97.3 F | HEIGHT: 70 IN | OXYGEN SATURATION: 96 %

## 2025-07-11 DIAGNOSIS — M32.9 SLE (SYSTEMIC LUPUS ERYTHEMATOSUS RELATED SYNDROME) (MULTI): Primary | ICD-10-CM

## 2025-07-11 DIAGNOSIS — Z86.2 HISTORY OF AUTOIMMUNE DISEASE: ICD-10-CM

## 2025-07-11 DIAGNOSIS — Z79.899 OTHER LONG TERM (CURRENT) DRUG THERAPY: ICD-10-CM

## 2025-07-11 DIAGNOSIS — L98.6 OTHER INFILTRATIVE DISORDERS OF THE SKIN AND SUBCUTANEOUS TISSUE: ICD-10-CM

## 2025-07-11 DIAGNOSIS — R56.9: ICD-10-CM

## 2025-07-11 DIAGNOSIS — L93.2 CUTANEOUS LUPUS ERYTHEMATOSUS: ICD-10-CM

## 2025-07-11 NOTE — PROGRESS NOTES
Subjective   Patient ID: Jalen Laboy is a 74 y.o. male who presents for Follow-up.    HPI  73 yo male with HTN, HLD, IHD  He denies any joint pain or AM stiffness, Raynaud  He denies any malar rash, but he has photosensitive  He had a skin rashes in his body, legs  His rashes started last summer, he used some topical treatment  Derm saw him and started PRD and his rashes improved, but when he tapered off PRD, his rashes came back  Then, his NESSA came positive.  3-4 weeks ago, they started  mg daily.  Interstitial granulomatous dermatitis  He had an open heart surgery 2 years ago  He was using statin and still using it now.  He is also using levithyroxin, he had hypothryoidisim in 01/23  No family h/o SLE, CTD or inflammatory arthritis  Using HCQ     Interval history:  He stopped PRD 2 months ago and his skin rashes came back  He reports skin rashes all over his body especially in is upper extremity, back and arms  He is using  regularly    11/2024:  His rash improved significantly, having still some rash, but no itching or discomfort now.  He tapered down his PRD to 5 mg daily  He does have ear problem, ear fullness and hearing loss.    03/25:  He tapered town his PRD to 2.5 and then he has noted rash in his arms and abdomen.    07/25:  He needs to use 5 mg daily PRD  Tried tapering down but he had flare  Still having cutaneous lesions in his back and arms    Current meds:  MTX 12.5 mg week    PRD 5 mg daily    NESSA 1/2560  ESR 14  CRP 1.23 (3.7)  RNP 2.3  Histon 1.0  06/23:  WBC 3.7, PLt 101  ROS  Joint pain in hands: negative   Joint swelling: negative  Morning stiffness and duration: negative   strength: normal  Oral ulcer: negative  Genital ulcer: negative  Raynaud phenomenon: negative  Chest pain/dyspnea: negative  Low back pain: negative  Visual problem: negative  Dry eyes/dry mouth: negative  Skin rash/scaling/psoriasis: negative       Objective     PEXAM  VS reviewed,  WNL  General: Alert, no distress   HEENT: Normocephalic/atraumatic, No alopecia. PERRLA. Sclera white, conjunctiva pink, no malar rash. no oral or nasal ulcer. Oral cavity pink and moist, no erythema or exudate, dentition good.   Neck: supple  Respiratory: CTA B, no adventitious breath sounds  Cardiac: RRR, no murmurs, carotid, or bruits  Abdominal: symmetrical, soft, non-tender, non-distended, normoactive BSx4 quadrants, no CVA tenderness or suprapubic tenderness  MSK: Joints of upper and lower extremities were assessed for synovitis and ROM.    Today she has no evidence of synovitis in the joints of her hands or wrists, tender joint count 0, swollen joint count 0   Extremities: no clubbing, no cyanosis, no edema  Skin: [place] rash in his arms and abdomen, back  Neuro: non-focal, Strength 5/5 throughout. Normal gait. No cerebellar pathologic exam     Assessment/Plan   73 yo male with HTN, HLD, CHD and hypothyroidisim  He had a rash in last summer, his rash was photosensitive  Before rash he was diagnosed with hypothyroidism  He used PRD and then his rash improved, but it recurred after stopping PRD.  Skin bx showed interstitial granulomatous dermatitis  His NESSA came high titer positive and his CBC showed leukopenia and low Plt at that time of rash.  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 His skin rashes improved after PRD, but when he tapered off PRD, his rashes came back.  He is concerned about Drug induced lupus because of positive histon. But, his histone is very weak pos (1.0). He was using statin and amlodipine, no other meds.  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  His symptoms improved significantly, tapered down his PRD to 5 mg daily, he does have only residual mild rash, but no itching.  His PExam did not reveal any synovitis, it showed non-palpable mild rash in his arms and torso  I think he had cutaneous lupus flare, but he has  also systemic lupus because his NESSA, RNP pos and he has leukopenia and thrombocytopenia.  07/25:  He tapered down his PRD to 2.5 mg and then he had rash again. Today, he has some skin lesions on his back and arms  Cannot taper down his PRD lower than 5 mg daily  Discussed with him and decided to add belimumab infusion  -will see his markers  -will check his hepatitis and TB tests  -will increase methotrexate to 15 and same dose   -will see him in 3-4 months

## 2025-07-12 LAB
HBV SURFACE AG SERPL QL IA: NORMAL
HCV AB SERPL QL IA: NORMAL

## 2025-07-14 ENCOUNTER — SPECIALTY PHARMACY (OUTPATIENT)
Dept: PHARMACY | Facility: CLINIC | Age: 75
End: 2025-07-14

## 2025-07-14 DIAGNOSIS — M32.9 SYSTEMIC LUPUS ERYTHEMATOSUS, UNSPECIFIED SLE TYPE, UNSPECIFIED ORGAN INVOLVEMENT STATUS (MULTI): Primary | ICD-10-CM

## 2025-07-14 RX ORDER — FAMOTIDINE 10 MG/ML
20 INJECTION, SOLUTION INTRAVENOUS ONCE AS NEEDED
OUTPATIENT
Start: 2025-07-28

## 2025-07-14 RX ORDER — EPINEPHRINE 0.3 MG/.3ML
0.3 INJECTION SUBCUTANEOUS EVERY 5 MIN PRN
OUTPATIENT
Start: 2025-07-28

## 2025-07-14 RX ORDER — DIPHENHYDRAMINE HCL 25 MG
25 TABLET ORAL ONCE
OUTPATIENT
Start: 2025-07-28

## 2025-07-14 RX ORDER — ALBUTEROL SULFATE 0.83 MG/ML
3 SOLUTION RESPIRATORY (INHALATION) AS NEEDED
OUTPATIENT
Start: 2025-07-28

## 2025-07-14 RX ORDER — ACETAMINOPHEN 325 MG/1
650 TABLET ORAL ONCE
OUTPATIENT
Start: 2025-07-28

## 2025-07-14 RX ORDER — DIPHENHYDRAMINE HYDROCHLORIDE 50 MG/ML
50 INJECTION, SOLUTION INTRAMUSCULAR; INTRAVENOUS AS NEEDED
OUTPATIENT
Start: 2025-07-28

## 2025-07-14 NOTE — PROGRESS NOTES
New start Benlysta infusions - 10mg/kg IV every 2 weeks for 3 doses (loading dose) followed by 10mg/kg IV once every 4 weeks for maintenance dose.   Standard premeds: tylenol 650mg PO, benadryl 25mg PO and methylprednisolone 100mg IVP.  Updated therapy plan and smart set orders sent to [facility] for the [facility].

## 2025-07-15 ENCOUNTER — APPOINTMENT (OUTPATIENT)
Dept: OTOLARYNGOLOGY | Facility: CLINIC | Age: 75
End: 2025-07-15
Payer: MEDICARE

## 2025-07-17 ENCOUNTER — DOCUMENTATION (OUTPATIENT)
Dept: INFUSION THERAPY | Facility: CLINIC | Age: 75
End: 2025-07-17
Payer: MEDICARE

## 2025-07-17 RX ORDER — EPINEPHRINE 0.3 MG/.3ML
0.3 INJECTION SUBCUTANEOUS EVERY 5 MIN PRN
OUTPATIENT
Start: 2025-08-21

## 2025-07-17 RX ORDER — DIPHENHYDRAMINE HCL 25 MG
25 TABLET ORAL ONCE
OUTPATIENT
Start: 2025-08-21

## 2025-07-17 RX ORDER — ACETAMINOPHEN 325 MG/1
650 TABLET ORAL ONCE
OUTPATIENT
Start: 2025-08-21

## 2025-07-17 RX ORDER — FAMOTIDINE 10 MG/ML
20 INJECTION, SOLUTION INTRAVENOUS ONCE AS NEEDED
OUTPATIENT
Start: 2025-08-21

## 2025-07-17 RX ORDER — ALBUTEROL SULFATE 0.83 MG/ML
3 SOLUTION RESPIRATORY (INHALATION) AS NEEDED
OUTPATIENT
Start: 2025-08-21

## 2025-07-17 RX ORDER — DIPHENHYDRAMINE HYDROCHLORIDE 50 MG/ML
50 INJECTION, SOLUTION INTRAMUSCULAR; INTRAVENOUS AS NEEDED
OUTPATIENT
Start: 2025-08-21

## 2025-07-17 NOTE — PROGRESS NOTES
CLINICAL CLEARANCE FOR OUTPATIENT INFUSION      Patient to be scheduled for New Start of Benlysta infusions.    For Diagnosis: Lupus    Dosing of Benlysta is weight based at: 10mg/kg   Using Dosing Weight of: 90.4 kg  For a Total Dose of: 900 mg IV every 2 weeks x3 doses (induction) then 10mg/kg IV every 4 weeks thereafter (maintenance).      Urine Hcg test ordered prior to first infusion?  Not applicable     Induction and Maintenance Therapy Plans entered if needed?  No - MESSAGE SENT TO THE PRESCRIBING TEAM     Last infusion received: na (if continuation)    Due: anytime     Okay to schedule for treatment as ordered per prescribing provider.     PA COMPLETE    KEYA Faustin-CNP    [facility])  [address] [place] [address] A, [place] [address]  Phone: [phone]

## 2025-07-29 NOTE — PROGRESS NOTES
Primary Care Physician: No Assigned PCP Generic Provider, MD      Date of Visit: 07/31/2025  8:20 AM EDT  Location of visit: [facility]   Type of Visit: Established Patient     HPI / Summary:   Jalen Laboy is a very pleasant 74 y.o. male presenting for management of     He has a history of        ROS: Full 10 pt review of symptoms of negative unless discussed above.     Problems:   Problem List[1]  Medical History:   Medical History[2]  Surgical Hx:   Surgical History[3]   Social Hx:   Tobacco Use: Medium Risk (7/8/2025)    Patient History     Smoking Tobacco Use: Former     Smokeless Tobacco Use: Never     Passive Exposure: Past     Alcohol Use: Not At Risk (5/30/2024)    AUDIT-C     Frequency of Alcohol Consumption: 2-3 times a week     Average Number of Drinks: 1 or 2     Frequency of Binge Drinking: Never     Family Hx:   Family History[4]   Exam:   Vitals: There were no vitals filed for this visit.  Wt Readings from Last 5 Encounters:   07/11/25 90.4 kg (199 lb 3.2 oz)   04/03/25 92.9 kg (204 lb 12.8 oz)   03/05/25 93 kg (205 lb)   02/10/25 93 kg (205 lb)   02/05/25 90.3 kg (199 lb)      Physical Exam  Labs:   Recent Labs     02/13/25  0743 11/06/24  1515 08/14/24  1629 06/13/24  0817 12/07/23  0722 06/20/23  0705 05/26/22  1017 05/11/22  0625   WBC 3.9 4.5 4.2* 5.7 5.1 3.7* 3.8* 7.5   HGB 15.5 14.3 14.5 14.2 13.7 15.0 10.1* 8.8*   HCT 45.8 42.5 44.1 43.5 41.1 45.0 32.9* 25.6*    128* 115* 97* 143* 101* 299 166   MCV 84.2 83 84 86 85 84 88 85     Recent Labs     02/13/25  0743 12/26/24  0716 11/06/24  1515 08/14/24  1629 06/26/24  1515 12/07/23  0722 06/20/23  0705 09/07/22  0834    140 139 139 142 140 139 136   K 4.2 3.8 4.1 3.5 4.0 3.6 3.7 4.6   CL 99 97* 96* 97* 98 101 100 97*   BUN 12 12 10 11 13 15 14 12   CREATININE 0.71 0.69 0.68 0.78 0.70 0.84 0.77 0.75      Recent Labs     02/13/25  0745   HGBA1C 5.5     Lab Results   Component Value Date    CHOL 102 02/13/2025    HDL 31.5  02/13/2025    LDLCALC 55 02/13/2025    TRIG 80 02/13/2025     Notable Studies: imaging personally reviewed and summarized by me below  EKG:  Encounter Date: 04/03/25   ECG 12 lead (Clinic Performed)   Result Value    Ventricular Rate 56    Atrial Rate 56    HI Interval 170    QRS Duration 82    QT Interval 414    QTC Calculation(Bazett) 399    P Axis 75    R Axis 111    T Axis 49    QRS Count 9    Q Onset 222    P Onset 137    P Offset 199    T Offset 429    QTC Fredericia 405    Narrative    Sinus bradycardia  Right axis deviation  Septal infarct (cited on or before 03-APR-2025)  Abnormal ECG  When compared with ECG of 06-JUN-2024 08:42,  No significant change was found  Confirmed by Anderson Fermin ([id number]) on 4/11/2025 3:23:31 PM       Imaging:  === Results for orders placed in visit on 04/19/22 ===    CT ANGIO HEART CORONARY [[id number]] 04/19/2022    Status: Normal  1.  3 vessel coronary artery atherosclerotic disease (lad 70%, Circ  30%, RI 70%, RCA 25-50 %)  2. This was a research study which underwent CT perfusion. Study was  sent to heart Mail[facility]. Report will be addended once results available.  3. Aneurysmal dilation of the ascending aorta measuring 4.6 cm in  diameter. This is unchanged since CT 02/22/2022. Recommend 1 year  contrasted chest CT for surveillance.  4. Noncalcified pulmonary nodule. Follow-up per Fleischner criteria    I personally reviewed the images/study and I agree with the findings  as stated. This study was interpreted at [facility], [place], [place].     MRI:  No results found for this or any previous visit.    Echo:   No results found for this or any previous visit from the past 1095 days.        Catheterization:  - [facility] ()    Cardiac stress test  No results found for this or any previous visit from the past 365 days.          Current Outpatient Medications   Medication Instructions    amLODIPine (NORVASC) 10 mg, Daily    aspirin 81 mg EC tablet 1 tablet,  Daily    azithromycin (ZITHROMAX) 250 mg, Daily    belimumab (Benlysta) 120 mg recon soln IV injection Infuse 10mg/kg (900mg) IV once every 2 weeks for 3 doses for loading dose followed by 10mg/kg (900mg) IV every 4 weeks thereafter    carvedilol (COREG) 25 mg, oral, 2 times daily    fish oil concentrate (Omega-3) 120-180 mg capsule 1 capsule, Daily    folic acid (FOLVITE) 1 mg, oral, Daily    hydroCHLOROthiazide (HYDRODIURIL) 25 mg, oral, Daily    hydroxychloroquine (PLAQUENIL) 200 mg, oral, 2 times daily    inclisiran (LEQVIO) 284 mg, subcutaneous, Every 3 months, at 0, 3 months and then every 6 months thereafter    levothyroxine (Synthroid, Levoxyl) 137 mcg tablet Take 1 tablet once daily except none on Sundays    losartan (Cozaar) 100 mg tablet TAKE 1 TABLET DAILY IN THE EVENING (DOSE INCREASED, STOP ISOSORBIDE)    methotrexate (Trexall) 2.5 mg tablet TAKE AS INSTRUCTED BY YOUR PRESCRIBER    multivitamin with minerals iron-free (Centrum Silver) 1 tablet, Daily    neomycin-polymyxin-HC (Cortisporin) 3.5-10,000-1 mg/mL-unit/mL-% otic suspension 4 drops in the affected ear 3 times a day x 10 days    predniSONE (Deltasone) 2.5 mg tablet Start taking 5 mg (2 pills) on day one, followed by 2.5 mg (1 pill) on the second day, you will alternate this combination for 14 days. Then, take 2.5 mg (1 pill) daily for 14 days. Then, for the next 14 days, take 2.5 mg (1 pill) every other day for 14 days    predniSONE (Deltasone) 2.5 mg tablet TAKE 4 TABLETS ONCE DAILY FOR 14 DAYS THEN 3 TABLETS ONCE DAILY FOR 14 DAYS THEN 2 TABLETS ONCE DAILY, THEREAFTER    predniSONE (DELTASONE) 5 mg, oral, Daily     Impressions and Plan:    #      Patient Instructions:  If you have any questions or need cardiac medication refills, please call my office at [phone],      To reach my office please call [phone]  To schedule an appointment call [phone].          ____________________________________________________________  Mara  Ramón Estrella MD  Division of Cardiovascular Medicine  [facility]        [1]   Patient Active Problem List  Diagnosis    Benign essential HTN    Abnormal laboratory test    Aortic aneurysm, thoracic    Benign neoplasm of skin    Bilateral sensorineural hearing loss    CAD (coronary artery disease)    Epidermal cyst    Fullness in ear, bilateral    Hypothyroidism    Left wrist pain    Left wrist tendonitis    Lesion of skin of scalp    Myopia of both eyes    Osteoarthritis of carpometacarpal (CMC) joint of thumb    Other hypertrophic disorders of the skin    Pain of both elbows    Hypertension    Pseudogout of right wrist    S/P ascending aortic replacement    S/P CABG x 4    Serous otitis media    Single seizure (Multi)    Other seborrheic keratosis    Thrombocytopenia    Presbyopia    Age-related nuclear cataract of both eyes    Interstitial granulomatous dermatitis    Long-term use of hydroxychloroquine    Combined form of age-related cataract, right eye    Combined form of age-related cataract, left eye    Astigmatism of both eyes    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    Lupus (systemic lupus erythematosus) (Multi)   [2]   Past Medical History:  Diagnosis Date    Disease of thyroid gland     Eczema     Heart disease     Hypertension     Personal history of other benign neoplasm     History of benign neoplasm of skin    Shingles     Skin tag     Unspecified convulsions (Multi)     Single seizure    Varicella    [3]   Past Surgical History:  Procedure Laterality Date    CARDIAC SURGERY      CT ANGIO CORONARY ART WITH HEARTFLOW IF SCORE >30%  04/19/2022    CT HEART CORONARY ANGIOGRAM 4/19/2022 [facility]    SKIN BIOPSY  11/1023   [4]   Family History  Problem Relation Name Age of Onset    Hypertension Mother Latosha

## 2025-07-31 ENCOUNTER — APPOINTMENT (OUTPATIENT)
Dept: CARDIOLOGY | Facility: HOSPITAL | Age: 75
End: 2025-07-31
Payer: MEDICARE

## 2025-08-04 ENCOUNTER — APPOINTMENT (OUTPATIENT)
Dept: CARDIOLOGY | Facility: HOSPITAL | Age: 75
End: 2025-08-04
Payer: MEDICARE

## 2025-08-05 ENCOUNTER — INFUSION (OUTPATIENT)
Dept: INFUSION THERAPY | Facility: CLINIC | Age: 75
End: 2025-08-05
Payer: MEDICARE

## 2025-08-05 ENCOUNTER — APPOINTMENT (OUTPATIENT)
Dept: OTOLARYNGOLOGY | Facility: CLINIC | Age: 75
End: 2025-08-05
Payer: MEDICARE

## 2025-08-05 VITALS
OXYGEN SATURATION: 97 % | TEMPERATURE: 97.2 F | WEIGHT: 193.78 LBS | SYSTOLIC BLOOD PRESSURE: 170 MMHG | RESPIRATION RATE: 18 BRPM | BODY MASS INDEX: 27.81 KG/M2 | DIASTOLIC BLOOD PRESSURE: 75 MMHG | HEART RATE: 48 BPM

## 2025-08-05 DIAGNOSIS — Z79.899 OTHER LONG TERM (CURRENT) DRUG THERAPY: ICD-10-CM

## 2025-08-05 DIAGNOSIS — M32.9 SYSTEMIC LUPUS ERYTHEMATOSUS, UNSPECIFIED SLE TYPE, UNSPECIFIED ORGAN INVOLVEMENT STATUS (MULTI): ICD-10-CM

## 2025-08-05 DIAGNOSIS — Z86.2 HISTORY OF AUTOIMMUNE DISEASE: ICD-10-CM

## 2025-08-05 DIAGNOSIS — L98.6 OTHER INFILTRATIVE DISORDERS OF THE SKIN AND SUBCUTANEOUS TISSUE: ICD-10-CM

## 2025-08-05 LAB
DSDNA AB SER-ACNC: <1 IU/ML
ERYTHROCYTE [SEDIMENTATION RATE] IN BLOOD BY WESTERGREN METHOD: 3 MM/H (ref 0–20)

## 2025-08-05 PROCEDURE — 96365 THER/PROPH/DIAG IV INF INIT: CPT | Performed by: NURSE PRACTITIONER

## 2025-08-05 PROCEDURE — 86225 DNA ANTIBODY NATIVE: CPT

## 2025-08-05 PROCEDURE — 85652 RBC SED RATE AUTOMATED: CPT

## 2025-08-05 PROCEDURE — 96375 TX/PRO/DX INJ NEW DRUG ADDON: CPT | Performed by: NURSE PRACTITIONER

## 2025-08-05 RX ORDER — FAMOTIDINE 10 MG/ML
20 INJECTION, SOLUTION INTRAVENOUS ONCE AS NEEDED
OUTPATIENT
Start: 2025-08-19

## 2025-08-05 RX ORDER — ALBUTEROL SULFATE 0.83 MG/ML
3 SOLUTION RESPIRATORY (INHALATION) AS NEEDED
OUTPATIENT
Start: 2025-08-19

## 2025-08-05 RX ORDER — DIPHENHYDRAMINE HYDROCHLORIDE 50 MG/ML
50 INJECTION, SOLUTION INTRAMUSCULAR; INTRAVENOUS AS NEEDED
OUTPATIENT
Start: 2025-08-19

## 2025-08-05 RX ORDER — ACETAMINOPHEN 325 MG/1
650 TABLET ORAL ONCE
Status: COMPLETED | OUTPATIENT
Start: 2025-08-05 | End: 2025-08-05

## 2025-08-05 RX ORDER — DIPHENHYDRAMINE HCL 25 MG
25 CAPSULE ORAL ONCE
Status: COMPLETED | OUTPATIENT
Start: 2025-08-05 | End: 2025-08-05

## 2025-08-05 RX ORDER — DIPHENHYDRAMINE HCL 25 MG
25 CAPSULE ORAL ONCE
OUTPATIENT
Start: 2025-08-19

## 2025-08-05 RX ORDER — EPINEPHRINE 0.3 MG/.3ML
0.3 INJECTION SUBCUTANEOUS EVERY 5 MIN PRN
OUTPATIENT
Start: 2025-08-19

## 2025-08-05 RX ORDER — ACETAMINOPHEN 325 MG/1
650 TABLET ORAL ONCE
OUTPATIENT
Start: 2025-08-19

## 2025-08-05 RX ADMIN — ACETAMINOPHEN 650 MG: 325 TABLET ORAL at 15:48

## 2025-08-05 RX ADMIN — Medication 25 MG: at 15:48

## 2025-08-05 ASSESSMENT — ENCOUNTER SYMPTOMS
ABDOMINAL PAIN: 0
HEADACHES: 0
BRUISES/BLEEDS EASILY: 0
APPETITE CHANGE: 0
EXTREMITY WEAKNESS: 0
SORE THROAT: 0
CONSTIPATION: 0
BLOOD IN STOOL: 0
SHORTNESS OF BREATH: 0
NERVOUS/ANXIOUS: 0
ARTHRALGIAS: 0
LIGHT-HEADEDNESS: 0
FATIGUE: 0
MYALGIAS: 0
FEVER: 0
DYSURIA: 0
UNEXPECTED WEIGHT CHANGE: 0
LEG SWELLING: 0
FREQUENCY: 0
DIARRHEA: 0
DEPRESSION: 0
DIZZINESS: 0
COUGH: 0
CHILLS: 0
VOICE CHANGE: 0
WHEEZING: 0
VOMITING: 0
TROUBLE SWALLOWING: 0
NUMBNESS: 0
EYE PROBLEMS: 0
NAUSEA: 0
WOUND: 0
PALPITATIONS: 0
HEMATURIA: 0

## 2025-08-05 NOTE — PROGRESS NOTES
[facility]   Infusion Clinic Note   Date: 2025   Name: Jalen Laboy  : 1950   MRN: [id number]         Reason for Visit: New Patient Visit and OP Infusion (PATIENT IS HERE FOR NEW START (INDUCTION DOSE #1) BENLYSTA 900 MG INFUSION EVERY 2 WEEKS X 3 DOSES.)         Today: We administered acetaminophen, methylPREDNISolone sod succinate, diphenhydrAMINE, and belimumab (Benlysta) 900 mg in sodium chloride 0.9% 250 mL IV.       Referring Provider: Raymond Alba MD    Plan Provider: Raymond Alba MD      For a Diagnosis of: Systemic lupus erythematosus, unspecified SLE type, unspecified organ involvement status (Multi)    Other infiltrative disorders of the skin and subcutaneous tissue    Other long term (current) drug therapy    History of autoimmune disease       At today's visit patient accompanied by: Wife      Today's Vitals:   Vitals:    25 1500 25 1659 25 1710 25 1825   BP: 136/69 159/70 154/74 170/75   Pulse: 54 (!) 47 (!) 46 (!) 48   Resp: 18 18 18    Temp: 36.1 °C (97 °F)  36.2 °C (97.2 °F) 36.2 °C (97.2 °F)   TempSrc: Temporal      SpO2: 96% 97% 97% 97%   Weight: 87.9 kg (193 lb 12.6 oz)                Pre - Treatment Checklist:      - Previous reaction to current treatment: n/a FIRST DOSE TODAY      (Assess patient for the concerns below. Document provider notification as appropriate).  - Active or recent infection with/without current antibiotic use: no  - Recent or planned invasive dental work: no  - Recent or planned surgeries: no  - Recently received or plans to receive vaccinations: no  - Has treatment related toxicities: n/a FIRST DOSE TODAY  - Any chance may be pregnant:  n/a      Pain: 3 HAS ARTHRITS OF ELBOWS   - Is the pain different from normal: no   - Is prescribing Doctor aware:  yes      Labs: Labs drawn and sent per order      Fall Risk Screening:              Review Of Systems:  Review of Systems   Constitutional:  Negative  "for appetite change, chills, fatigue, fever and unexpected weight change.   HENT:   Negative for hearing loss, mouth sores, sore throat, tinnitus, trouble swallowing and voice change.    Eyes:  Negative for eye problems.        WEARS GLASSES   Respiratory:  Negative for cough, shortness of breath and wheezing.    Cardiovascular:  Negative for chest pain, leg swelling and palpitations.        HISTORY OF QUADRUPLE BYPASS 3 YEARS AGO.   Gastrointestinal:  Negative for abdominal pain, blood in stool, constipation, diarrhea, nausea and vomiting.   Genitourinary:  Negative for dysuria, frequency and hematuria.    Musculoskeletal:  Negative for arthralgias and myalgias.   Skin:  Positive for rash. Negative for itching and wound.        PATIENT STATED IS BEING STARTED ON BENLYSTA INFUSIONS TO \"GET OFF PREDNISONE.\"    Neurological:  Negative for dizziness, extremity weakness, headaches, light-headedness and numbness.   Hematological:  Does not bruise/bleed easily.   Psychiatric/Behavioral:  Negative for depression. The patient is not nervous/anxious.          Infusion Readiness:  - Assessment Concerns Related to Infusion: No  - Provider notified: n/a      New Patient Education:    NEW PATIENT MEDICATION EDUCATION PT PROVIDED WITH WRITTEN ([employer] PT EDUCATION SHEET) AND VERBAL EDUCATION REGARDING MEDICATION GIVEN. VERIFIED MEDICATION NAME WITH PATIENT AND DISCUSSED REASON FOR USE. BRIEFLY DISCUSSED HOW MEDICATION WORKS AND EDUCATED ON GOAL OF TREATMENT, FREQUENCY OF TREATMENT, ADVERSE RXN'S AND COMMON SIDE EFFECTS TO MONITOR FOR. INSTRUCTED PT TO ASSURE THAT ALL PROVIDERS INCLUDING DENTISTS ARE AWARE OF MEDICATION RECEIVED. DISCUSSED FLOW OF VISIT AND ORIENTED TO INFUSION CENTER. PT VERBALIZES UNDERSTANDING. CALL LIGHT PROVIDED AND PT AWARE TO ALERT STAFF OF ANY CONCERNS DURING TREATMENT. YES        Treatment Conditions & Drug Specific Questions:    Belimumab (BENLYSTA)    (Unless otherwise specified on patient specific " therapy plan):     TREATMENT CONDITIONS  Does the patient have any signs or symptoms of or a new diagnosis of:    - Progressive multifocal leukoencephalopathy (PML) which may include: Memory loss, trouble thinking, dizziness, loss of balance, difficulty walking, loss of vision? Not applicable. FIRST DOSE TODAY    - New diagnosis of cancer?  No    (If YES to the above HOLD and contact prescribing provider prior to proceeding with today's infusion).     - New or worsening anxiety, depression or mood changes? No   (If YES update prescribing provider)    REMINDER:   - PREGNANCY CATEGORY X DRUG. OBTAIN NEGTATIVE PREGNANCY TEST PRIOR TO FIRST INFUSION FOR WOMEN OF CHILDBEARING ABILITY   - WEIGHT BASED DRUG     Recommended Vitals/Observation:  Vital Signs: Monitor patient's vital signs prior to infusion, every 30 minutes during infusion, at end of infusion and PRN.   Observation:      Induction: patient may leave 1 hour post-infusion.      Maintenance: For subsequent maintenance infusion, if no reaction, patient may leave immediately after. If patient has a history of past infusion reaction monitor 30 minutes post for every infusion     Observation complete.          Weight Based Drug Calculations:    WEIGHT BASED DRUGS: Belimumab (BENYLSTA)   Patient's dosing weight (kg): 90.4 KG     10% weight variance for prescribed treatment: 81.36 kg to 99.44 kg     Patient's weight today:   Vitals:    08/05/25 1500   Weight: 87.9 kg (193 lb 12.6 oz)         weight range for prescribed dose:     Patient weight today falls outside of 10% variance or  weight range: No     Home Care pharmacist informed of weight variance: Not applicable    Doses that are weight based have an acceptable variance rule within 10% of the prescribed   order and/or within  weight range. If patient weight on day of infusion falls   outside of the 10% variance, or weight range, infusion is administered and   pharmacy  contacted regarding future dosing adjustments, per policy.      Post Treatment: Patient tolerated treatment without issue and was discharged in no apparent distress.      Note Authored / Patient Cared for By: Cynthia Mccall RN                    Note authored and patient cared for by: CYNTHIA MCCALL RN   Note/Encounter reviewed by: KEYA Gray. This provider on site at time of patient infusion. Infusion staff to notify this provider of any questions, concerns, abnormals or issues during infusion.     Final check of medication completed by this DAGO / or on-site pharmacist with administering nurse using positive identification prior to administration. Final appearance of product checked for accuracy and conformity to the formula of the prepared product. Assured use of correct ingredients, accurate calculations and precise measurements under appropriate conditions and procedures.    RN made NP aware of pt's bradycardia during visit. Pt with h/o HTN. He states his HR typically is in the 50's at home. Has a cuff and checks his BP daily. Pt is asymptomatic. Pt will continue to monitor HR this evening and make his cardiologist aware if no improvement. Educated on when to seek emergency care.   (KEYA Gray)

## 2025-08-06 ENCOUNTER — APPOINTMENT (OUTPATIENT)
Dept: DERMATOLOGY | Facility: CLINIC | Age: 75
End: 2025-08-06
Payer: MEDICARE

## 2025-08-06 DIAGNOSIS — D22.5 MELANOCYTIC NEVI OF TRUNK: ICD-10-CM

## 2025-08-06 DIAGNOSIS — L82.1 SEBORRHEIC KERATOSIS: ICD-10-CM

## 2025-08-06 DIAGNOSIS — L92.0 GRANULOMA ANNULARE: Primary | ICD-10-CM

## 2025-08-06 DIAGNOSIS — L93.2 CUTANEOUS LUPUS ERYTHEMATOSUS: ICD-10-CM

## 2025-08-06 ASSESSMENT — DERMATOLOGY PATIENT ASSESSMENT
DO YOU USE A TANNING BED: NO
DO YOU HAVE ANY NEW OR CHANGING LESIONS: NO
DO YOU USE SUNSCREEN: OCCASIONALLY
ARE YOU AN ORGAN TRANSPLANT RECIPIENT: NO
HAVE YOU HAD OR DO YOU HAVE VASCULAR DISEASE: NO
HAVE YOU HAD OR DO YOU HAVE A STAPH INFECTION: NO

## 2025-08-06 ASSESSMENT — DERMATOLOGY QUALITY OF LIFE (QOL) ASSESSMENT
RATE HOW BOTHERED YOU ARE BY SYMPTOMS OF YOUR SKIN PROBLEM (EG, ITCHING, STINGING BURNING, HURTING OR SKIN IRRITATION): 0 - NEVER BOTHERED
RATE HOW EMOTIONALLY BOTHERED YOU ARE BY YOUR SKIN PROBLEM (FOR EXAMPLE, WORRY, EMBARRASSMENT, FRUSTRATION): 0 - NEVER BOTHERED
DATE THE QUALITY-OF-LIFE ASSESSMENT WAS COMPLETED: 67423
ARE THERE EXCLUSIONS OR EXCEPTIONS FOR THE QUALITY OF LIFE ASSESSMENT: NO
RATE HOW BOTHERED YOU ARE BY EFFECTS OF YOUR SKIN PROBLEMS ON YOUR ACTIVITIES (EG, GOING OUT, ACCOMPLISHING WHAT YOU WANT, WORK ACTIVITIES OR YOUR RELATIONSHIPS WITH OTHERS): 0 - NEVER BOTHERED
WHAT SINGLE SKIN CONDITION LISTED BELOW IS THE PATIENT ANSWERING THE QUALITY-OF-LIFE ASSESSMENT QUESTIONS ABOUT: NONE OF THE ABOVE

## 2025-08-06 ASSESSMENT — PATIENT GLOBAL ASSESSMENT (PGA): PATIENT GLOBAL ASSESSMENT: PATIENT GLOBAL ASSESSMENT:  2 - MILD

## 2025-08-06 ASSESSMENT — ITCH NUMERIC RATING SCALE: HOW SEVERE IS YOUR ITCHING?: 0

## 2025-08-06 NOTE — PATIENT INSTRUCTIONS
- Continue your medications as prescribed to you by your rheumatologist  - Follow up in clinic in 6 months

## 2025-08-06 NOTE — Clinical Note
- Diagnosed by rheumatology, does not quite have rash or systemic symptoms classic for lupus, however prior labs demonstrate autoimmunity   - History of +NESSA, anti-RNP elevated, anti-histone elevated  - Previous question of drug-induced rash, but patient tried going off amlodipine, carvedilol, losartan, and atorvastatin in the past without improvement of the rash   - Current regimen per rheumatology (Dr. Alba): hydroxychloroquine 200mg twice a day, methotrexate 15mg weekly, belimumab (Benlysta) infusion (first dose 8/5/25), prednisone 5mg daily   - Continue current regimen per rheumatology

## 2025-08-06 NOTE — Clinical Note
- The 2 areas the patient pointed out to us are benign seborrheic keratoses. The benign nature of these lesions was discussed with the patient. No treatment is medically indicated at this time. Patient expressed understanding.

## 2025-08-06 NOTE — PROGRESS NOTES
Subjective     Jalen Laboy is a 74 y.o. 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 He also would like us to look at 1 spot on his scalp and 1 spot above his ear that his  noticed. The spots are asymptomatic and he denies bleeding, pruritus, or pain in these lesions. Per patient he feels his granuloma annulare/lupus is better but still present.  He was initially evaluated at [facility] where biopsy on 11/16/2023 showed granulomatous dermatitis with negative direct immunofluorescence. [facility] treated him with a 6 week prednisone taper but rash did not improve.  [facility] derm performed biopsy 4/10/24 that showed superficial lymphohistiocytic infiltrate that was read as favoring granuloma annulare, less likely interstitial granulomatous dermatitis. Labs notable for NESSA+(4/10/24), anti-RNP antibody elevated (4/10/14), and anti-histone antibody elevated (6/26/24, 8/14/24). He stopped amlodipine, losartan, atorvastatin, and carvedilol without improvement (now back on carvedilol and losartan). Angiotensin converting enzyme obtained to rule out sarcoidosis and was negative. Malignancy driven rash unlikely (PSA normal, colonoscopy 2023 with single polyp, CT chest without lung nodules).  At that time [facility] derm started patient on hydroxychloroquine 200mg twice a day. He saw rheumatology (Dr. Alba) and was started on prednisone 20mg taper. Rheum was concerned for cutaneous lupus erythematous. He was restarted on prednisone 20mg taper 8/14/2024 and rheum also added methotrexate 12.5mg weekly. Saw rheum 11/6/24 and needed prednisone 5mg taper again. On 3/5/25 rheum  increased methotrexate 12.5mg to 15mg. Saw rheum 7/11/25 and they started patient on belimumab (Benlysta) and got his first infusion 8/5/25. Patient states he tolerated infusion without issue. He is still on prednisone 5mg daily because he flares every time he stops prednisone. Patient states prednisone also helps with his joint pain.   He saw ophthalmology on 7/8/25 and did not have signs of hydroxychloroquine toxicity.     Review of Systems:  No other skin or systemic complaints other than what is documented elsewhere in the note.    The following portions of the chart were reviewed this encounter and updated as appropriate:         Skin Cancer History  Biopsy Log Book  No skin cancers from Specimen Tracking.    Additional History  - Biopsy 4/10/24 showed superficial lymphohistiocytic infiltrate that was read as favoring granuloma annulare, less likely interstitial granulomatous dermatitis      Specialty Problems          Dermatology Problems    Benign neoplasm of skin    Formatting of this note might be different from the original. NEVI Formatting of this note might be different from the original. Overview: NEVI         Epidermal cyst    Other hypertrophic disorders of the skin    Other seborrheic keratosis    Lesion of skin of scalp    Interstitial granulomatous dermatitis        Objective   Well appearing patient in no apparent distress; mood and affect are within normal limits.    A full examination was performed including scalp, head, eyes, ears, nose, lips, neck, chest, axillae, abdomen, back, buttocks, bilateral upper extremities, bilateral lower extremities, hands, feet, fingers, toes, fingernails, and toenails. All findings within normal limits unless otherwise noted below.    Assessment/Plan   Skin Exam  1. GRANULOMA ANNULARE  Left Abdomen (side) - Lower, Left Abdomen (side) - Upper, Left Forearm - Anterior, Left Knee - Anterior, Left Lower Back, Left Popliteal Fossa, Mid Back, Right Abdomen (side) -  Lower, Right Abdomen (side) - Upper, Right Forearm - Anterior, Right Knee - Anterior, Right Lower Back, Right Popliteal Fossa  - Numerous, tan to erythematous, annular patches and plaques on abdomen, back, knees, popliteal fossa, and antecubital fossa   - Biopsy 4/10/24 showed superficial lymphohistiocytic infiltrate that was read as favoring granuloma annulare, less likely interstitial granulomatous dermatitis    - Patient currently on regimen for cutaneous lupus per rheumatology, these medications should improve granuloma annulare as well due to the autoimmune association   - If there is inadequate improvement with Benlysta, can consider adding a TNF-alpha inhibitor and/or increasing methotrexate dose to 25mg weekly   This Visit  - Follow Up In Dermatology - Established Patient  2. CUTANEOUS LUPUS ERYTHEMATOSUS  Generalized  - No lesions classic for cutaneous lupus erythematous seen on exam today  - Diagnosed by rheumatology, does not quite have rash or systemic symptoms classic for lupus, however prior labs demonstrate autoimmunity   - History of +NESSA, anti-RNP elevated, anti-histone elevated  - Previous question of drug-induced rash, but patient tried going off amlodipine, carvedilol, losartan, and atorvastatin in the past without improvement of the rash   - Current regimen per rheumatology (Dr. Alba): hydroxychloroquine 200mg twice a day, methotrexate 15mg weekly, belimumab (Benlysta) infusion (first dose 8/5/25), prednisone 5mg daily   - Continue current regimen per rheumatology   This Visit  - Follow Up In Dermatology - Established Patient  Existing Treatments  - hydroxychloroquine (Plaquenil) 200 mg tablet - Take 1 tablet (200 mg) by mouth 2 times a day.  - folic acid (Folvite) 1 mg tablet - Take 1 tablet (1 mg) by mouth once daily.  - predniSONE (Deltasone) 5 mg tablet - TAKE 1 TABLET DAILY  - methotrexate (Trexall) 2.5 mg tablet - TAKE AS INSTRUCTED BY YOUR PRESCRIBER  3. SEBORRHEIC KERATOSIS (2)  Mid  Parietal Scalp, Right Parietal Scalp  Stuck on verrucous, tan-brown papules and plaques.  - The 2 areas the patient pointed out to us are benign seborrheic keratoses. The benign nature of these lesions was discussed with the patient. No treatment is medically indicated at this time. Patient expressed understanding.   4. MELANOCYTIC NEVI OF TRUNK  Generalized  Brown macules and patches on trunk, arms, and legs with reassuring findings on dermoscopy   - The benign nature of these lesions was discussed with the patient.   - Recommend monitoring     Return to clinic in 6 months.    Cash Lock MD  PGY-2, Dermatology     I saw and evaluated the patient. I personally obtained the key and critical portions of the history and physical exam or was physically present for key and critical portions performed by the resident/fellow. I reviewed the resident/fellow's documentation and discussed the patient with the resident/fellow. I agree with the resident/fellow's medical decision making as documented in the note and made changes where appropriate.

## 2025-08-06 NOTE — Clinical Note
- Numerous, tan to erythematous, annular patches and plaques on abdomen, back, knees, popliteal fossa, and antecubital fossa   - Biopsy 4/10/24 showed superficial lymphohistiocytic infiltrate that was read as favoring granuloma annulare, less likely interstitial granulomatous dermatitis

## 2025-08-06 NOTE — Clinical Note
- Biopsy 4/10/24 showed superficial lymphohistiocytic infiltrate that was read as favoring granuloma annulare, less likely interstitial granulomatous dermatitis

## 2025-08-06 NOTE — Clinical Note
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

## 2025-08-08 LAB
NIL(NEG) CONTROL SPOT COUNT: NORMAL
PANEL A SPOT COUNT: 1
PANEL B SPOT COUNT: 2
POS CONTROL SPOT COUNT: NORMAL
T-SPOT. TB INTERPRETATION: NEGATIVE

## 2025-08-11 ENCOUNTER — APPOINTMENT (OUTPATIENT)
Dept: OTOLARYNGOLOGY | Facility: CLINIC | Age: 75
End: 2025-08-11
Payer: MEDICARE

## 2025-08-11 VITALS — BODY MASS INDEX: 27.74 KG/M2 | WEIGHT: 193.79 LBS | HEIGHT: 70 IN

## 2025-08-11 DIAGNOSIS — H90.A21 SENSORINEURAL HEARING LOSS (SNHL) OF RIGHT EAR WITH RESTRICTED HEARING OF LEFT EAR: ICD-10-CM

## 2025-08-11 DIAGNOSIS — H93.292 AUDITORY DISCRIMINATION IMPAIRMENT, LEFT: ICD-10-CM

## 2025-08-11 DIAGNOSIS — H60.8X3 CHRONIC ECZEMATOUS OTITIS EXTERNA OF BOTH EARS: Primary | ICD-10-CM

## 2025-08-11 DIAGNOSIS — Z86.79 HISTORY OF CORONARY ARTERY DISEASE: ICD-10-CM

## 2025-08-11 DIAGNOSIS — H61.23 BILATERAL IMPACTED CERUMEN: ICD-10-CM

## 2025-08-11 PROCEDURE — 99213 OFFICE O/P EST LOW 20 MIN: CPT | Performed by: OTOLARYNGOLOGY

## 2025-08-11 PROCEDURE — 1159F MED LIST DOCD IN RCRD: CPT | Performed by: OTOLARYNGOLOGY

## 2025-08-11 PROCEDURE — 3008F BODY MASS INDEX DOCD: CPT | Performed by: OTOLARYNGOLOGY

## 2025-08-11 PROCEDURE — 1160F RVW MEDS BY RX/DR IN RCRD: CPT | Performed by: OTOLARYNGOLOGY

## 2025-08-11 PROCEDURE — 69210 REMOVE IMPACTED EAR WAX UNI: CPT | Performed by: OTOLARYNGOLOGY

## 2025-08-19 ENCOUNTER — APPOINTMENT (OUTPATIENT)
Dept: INFUSION THERAPY | Facility: CLINIC | Age: 75
End: 2025-08-19
Payer: MEDICARE

## 2025-08-19 VITALS
OXYGEN SATURATION: 98 % | RESPIRATION RATE: 16 BRPM | WEIGHT: 198.52 LBS | HEART RATE: 65 BPM | BODY MASS INDEX: 28.49 KG/M2 | TEMPERATURE: 97.9 F | DIASTOLIC BLOOD PRESSURE: 75 MMHG | SYSTOLIC BLOOD PRESSURE: 146 MMHG

## 2025-08-19 DIAGNOSIS — M32.9 SYSTEMIC LUPUS ERYTHEMATOSUS, UNSPECIFIED SLE TYPE, UNSPECIFIED ORGAN INVOLVEMENT STATUS (MULTI): ICD-10-CM

## 2025-08-19 PROCEDURE — 96365 THER/PROPH/DIAG IV INF INIT: CPT | Performed by: NURSE PRACTITIONER

## 2025-08-19 PROCEDURE — 96375 TX/PRO/DX INJ NEW DRUG ADDON: CPT | Performed by: NURSE PRACTITIONER

## 2025-08-19 RX ORDER — DIPHENHYDRAMINE HCL 25 MG
25 CAPSULE ORAL ONCE
Status: COMPLETED | OUTPATIENT
Start: 2025-08-19 | End: 2025-08-19

## 2025-08-19 RX ORDER — DIPHENHYDRAMINE HYDROCHLORIDE 50 MG/ML
50 INJECTION, SOLUTION INTRAMUSCULAR; INTRAVENOUS AS NEEDED
OUTPATIENT
Start: 2025-09-02

## 2025-08-19 RX ORDER — ACETAMINOPHEN 325 MG/1
650 TABLET ORAL ONCE
Status: COMPLETED | OUTPATIENT
Start: 2025-08-19 | End: 2025-08-19

## 2025-08-19 RX ORDER — ALBUTEROL SULFATE 0.83 MG/ML
3 SOLUTION RESPIRATORY (INHALATION) AS NEEDED
OUTPATIENT
Start: 2025-09-02

## 2025-08-19 RX ORDER — EPINEPHRINE 0.3 MG/.3ML
0.3 INJECTION SUBCUTANEOUS EVERY 5 MIN PRN
OUTPATIENT
Start: 2025-09-02

## 2025-08-19 RX ORDER — DIPHENHYDRAMINE HCL 25 MG
25 CAPSULE ORAL ONCE
OUTPATIENT
Start: 2025-09-02

## 2025-08-19 RX ORDER — ACETAMINOPHEN 325 MG/1
650 TABLET ORAL ONCE
OUTPATIENT
Start: 2025-09-02

## 2025-08-19 RX ORDER — FAMOTIDINE 10 MG/ML
20 INJECTION, SOLUTION INTRAVENOUS ONCE AS NEEDED
OUTPATIENT
Start: 2025-09-02

## 2025-08-19 RX ADMIN — Medication 25 MG: at 15:20

## 2025-08-19 RX ADMIN — ACETAMINOPHEN 650 MG: 325 TABLET ORAL at 15:20

## 2025-08-19 ASSESSMENT — ENCOUNTER SYMPTOMS
BLOOD IN STOOL: 0
TROUBLE SWALLOWING: 0
LEG SWELLING: 0
BRUISES/BLEEDS EASILY: 0
HEADACHES: 0
DIZZINESS: 0
HEMATURIA: 0
ARTHRALGIAS: 0
DYSURIA: 0
WHEEZING: 0
NAUSEA: 0
WOUND: 0
CONSTIPATION: 0
VOMITING: 0
NUMBNESS: 0
DEPRESSION: 0
VOICE CHANGE: 0
EXTREMITY WEAKNESS: 0
CHILLS: 0
SORE THROAT: 0
PALPITATIONS: 0
EYE PROBLEMS: 0
DIARRHEA: 0
ABDOMINAL PAIN: 0
FREQUENCY: 0
SHORTNESS OF BREATH: 0
NERVOUS/ANXIOUS: 0
COUGH: 0
FATIGUE: 0
UNEXPECTED WEIGHT CHANGE: 0
MYALGIAS: 0
APPETITE CHANGE: 0
FEVER: 0
LIGHT-HEADEDNESS: 0

## 2025-08-25 DIAGNOSIS — L93.2 CUTANEOUS LUPUS ERYTHEMATOSUS: ICD-10-CM

## 2025-08-25 DIAGNOSIS — L30.8 INTERSTITIAL GRANULOMATOUS DERMATITIS: ICD-10-CM

## 2025-08-25 RX ORDER — HYDROXYCHLOROQUINE SULFATE 200 MG/1
TABLET, FILM COATED ORAL 2 TIMES DAILY
Qty: 180 TABLET | Refills: 3 | Status: SHIPPED | OUTPATIENT
Start: 2025-08-25

## 2025-09-02 ENCOUNTER — APPOINTMENT (OUTPATIENT)
Dept: INFUSION THERAPY | Facility: CLINIC | Age: 75
End: 2025-09-02
Payer: MEDICARE

## 2025-09-02 ASSESSMENT — ENCOUNTER SYMPTOMS
HEADACHES: 0
BLOOD IN STOOL: 0
EYE PROBLEMS: 0
DIARRHEA: 0
ARTHRALGIAS: 1
NAUSEA: 0
WOUND: 0
WHEEZING: 0
CONSTIPATION: 0
FEVER: 0
VOMITING: 0
CHILLS: 0
APPETITE CHANGE: 0
EXTREMITY WEAKNESS: 0
COUGH: 0
ABDOMINAL PAIN: 0
UNEXPECTED WEIGHT CHANGE: 0
DIZZINESS: 0
LEG SWELLING: 0
MYALGIAS: 0
DEPRESSION: 0
VOICE CHANGE: 0
TROUBLE SWALLOWING: 0
HEMATURIA: 0
NERVOUS/ANXIOUS: 0
DYSURIA: 0
LIGHT-HEADEDNESS: 0
FATIGUE: 0
PALPITATIONS: 0
FREQUENCY: 0
BRUISES/BLEEDS EASILY: 0
SORE THROAT: 0
NUMBNESS: 0
SHORTNESS OF BREATH: 0

## 2025-09-30 ENCOUNTER — APPOINTMENT (OUTPATIENT)
Dept: INFUSION THERAPY | Facility: CLINIC | Age: 75
End: 2025-09-30
Payer: MEDICARE

## 2025-10-02 ENCOUNTER — APPOINTMENT (OUTPATIENT)
Dept: INFUSION THERAPY | Facility: CLINIC | Age: 75
End: 2025-10-02
Payer: MEDICARE

## 2025-11-21 ENCOUNTER — APPOINTMENT (OUTPATIENT)
Dept: RHEUMATOLOGY | Facility: CLINIC | Age: 75
End: 2025-11-21
Payer: MEDICARE

## 2025-12-18 ENCOUNTER — APPOINTMENT (OUTPATIENT)
Dept: OTOLARYNGOLOGY | Facility: CLINIC | Age: 75
End: 2025-12-18
Payer: MEDICARE

## 2026-01-27 ENCOUNTER — APPOINTMENT (OUTPATIENT)
Dept: OPHTHALMOLOGY | Facility: CLINIC | Age: 76
End: 2026-01-27
Payer: MEDICARE

## 2026-02-16 ENCOUNTER — APPOINTMENT (OUTPATIENT)
Dept: ENDOCRINOLOGY | Facility: CLINIC | Age: 76
End: 2026-02-16
Payer: MEDICARE

## 2026-02-23 ENCOUNTER — APPOINTMENT (OUTPATIENT)
Dept: ENDOCRINOLOGY | Facility: CLINIC | Age: 76
End: 2026-02-23
Payer: MEDICARE

## 2026-02-24 ENCOUNTER — APPOINTMENT (OUTPATIENT)
Dept: DERMATOLOGY | Facility: CLINIC | Age: 76
End: 2026-02-24
Payer: MEDICARE